# Patient Record
Sex: FEMALE
[De-identification: names, ages, dates, MRNs, and addresses within clinical notes are randomized per-mention and may not be internally consistent; named-entity substitution may affect disease eponyms.]

---

## 2018-01-15 ENCOUNTER — HOSPITAL (OUTPATIENT)
Dept: OTHER | Age: 38
End: 2018-01-15
Attending: EMERGENCY MEDICINE

## 2019-09-28 ENCOUNTER — OFFICE VISIT (OUTPATIENT)
Dept: URGENT CARE | Facility: CLINIC | Age: 39
End: 2019-09-28
Payer: COMMERCIAL

## 2019-09-28 VITALS
BODY MASS INDEX: 27.31 KG/M2 | SYSTOLIC BLOOD PRESSURE: 115 MMHG | HEIGHT: 64 IN | WEIGHT: 160 LBS | OXYGEN SATURATION: 100 % | RESPIRATION RATE: 19 BRPM | TEMPERATURE: 98 F | DIASTOLIC BLOOD PRESSURE: 80 MMHG | HEART RATE: 83 BPM

## 2019-09-28 DIAGNOSIS — B37.31 CANDIDA VAGINITIS: ICD-10-CM

## 2019-09-28 DIAGNOSIS — A64 STD (FEMALE): Primary | ICD-10-CM

## 2019-09-28 PROCEDURE — 87801 DETECT AGNT MULT DNA AMPLI: CPT

## 2019-09-28 PROCEDURE — 99203 PR OFFICE/OUTPT VISIT, NEW, LEVL III, 30-44 MIN: ICD-10-PCS | Mod: S$GLB,,, | Performed by: NURSE PRACTITIONER

## 2019-09-28 PROCEDURE — 99203 OFFICE O/P NEW LOW 30 MIN: CPT | Mod: S$GLB,,, | Performed by: NURSE PRACTITIONER

## 2019-09-28 PROCEDURE — 86703 HIV-1/HIV-2 1 RESULT ANTBDY: CPT

## 2019-09-28 PROCEDURE — 87481 CANDIDA DNA AMP PROBE: CPT | Mod: 59

## 2019-09-28 PROCEDURE — 87340 HEPATITIS B SURFACE AG IA: CPT

## 2019-09-28 PROCEDURE — 3008F BODY MASS INDEX DOCD: CPT | Mod: CPTII,S$GLB,, | Performed by: NURSE PRACTITIONER

## 2019-09-28 PROCEDURE — 86696 HERPES SIMPLEX TYPE 2 TEST: CPT

## 2019-09-28 PROCEDURE — 86592 SYPHILIS TEST NON-TREP QUAL: CPT

## 2019-09-28 PROCEDURE — 86706 HEP B SURFACE ANTIBODY: CPT

## 2019-09-28 PROCEDURE — 86803 HEPATITIS C AB TEST: CPT

## 2019-09-28 PROCEDURE — 3008F PR BODY MASS INDEX (BMI) DOCUMENTED: ICD-10-PCS | Mod: CPTII,S$GLB,, | Performed by: NURSE PRACTITIONER

## 2019-09-28 RX ORDER — FLUCONAZOLE 150 MG/1
150 TABLET ORAL DAILY
Qty: 2 TABLET | Refills: 0 | Status: SHIPPED | OUTPATIENT
Start: 2019-09-28 | End: 2019-09-29

## 2019-09-28 NOTE — PATIENT INSTRUCTIONS
STD Screening  You were tested for STDs today, we will call you in 5-7 days with the results of the testing  You were treated for gonorrhea and chlamydia today  Please take all antibiotics as directed to completion  If you need more medication when the labs result come in, we will call you and phone them in for you.    Increase condom use to prevent occurance.      IF POSITIVE:  Notify sexual partners of the need for testing.    Complete ALL medication prescribed.    NO sexual intercourse for 7 days after treatment.   Retest to ensure infection has cleared-there are infections that require more agressive treatment.  Retest for all in 6 weeks and again in 6 months to ensure true negative results.      Today's testing will give no crediable information if you have unprotected sexual activities going forward.     Syphillis cases are rising!    Gonorrhea has RESISTANT strains which is why repeat testing after treatment is important.    Gonorrhea may be present in multiple sites from just ONE area of exposure.      REMEMBER WEAR CONDOMS AND GET TESTED OFTEN.   What Are Sexually Transmitted Diseases (STDs)?  A sexually transmitted disease (STD) is a disease that is spread during sex. (An STD can also be called STI for sexually transmitted infection.) You can become infected with an STD if you have sex with someone who has an STD. Any sex that involves the penis, vagina, anus, or mouth can spread disease. Some STDs spread through body fluids such as semen, vaginal fluid, or blood. Others spread through contact with affected skin.  Who is at risk?     Places on or in the body where STDs cause damage include reproductive organs, the rectum, and the mouth.   It doesnt matter if youre straight or tubbs, male or female, young or old. Any person who has sex can get an STD. Your risk increases if:  · You have more than one partner. The more partners you have, the greater your risk.  · Your partner has other partners. If your  partner is exposed to an STD, you could be, too.  · You or your partner have had sex with other people in the past. Either of you might be carrying an STD from an earlier partner.  · You have an STD. The STD may cause sores or other health problems that increase your risk of new infections. Your risk will stay high unless you change the behaviors that put you at risk of the current infection.  Prevent future problems  Left untreated, certain STDs can lead to cancer or even death. Some can harm unborn babies whose mothers are infected. Others can cause you to not be able to have children (sterility) or can affect changes in behavior or your ability to think. You can prevent these problems with safer sex, regular checkups, and early treatment. Always use a latex condom when you have sex. Get tested if youre at risk. And get treated early if you have an STD.  Getting checked  The only sure way to know if you have an STD is to get checked by a healthcare provider. If you notice a change in how your body looks or feels, have it checked out. But keep in mind, STDs dont always show symptoms. So if youre at risk of STDs, get checked regularly. If you find you have an STD, be sure your partner gets treatment, too. If not, his or her health is at risk. And left untreated, your partner could pass the STD back to you, or on to others.  Common symptoms  Be alert to any changes in your body and your partners body. Symptoms may appear in or near the vagina, penis, rectum, mouth, or throat. They include:  · Unusual discharge  · Lumps, bumps, or rashes  · Sores that may be painful, itchy, or painless  · Itchy skin  · Burning with urination  · Pain in the pelvis, belly (abdomen), or rectum  Even if you dont have symptoms  You may have an STD, even if you dont have symptoms. If you think you are at risk, get checked. Go to a clinic or to your healthcare provider. If your partner has an STD, you need to be tested too, even if you  feel fine.  Vaccines to prevent disease  Vaccines (also called immunizations) are available to prevent hepatitis A and hepatitis B. These are two kinds of STDs. There is also a vaccine to prevent HPV. This is a virus that can be passed from person to person through sexual contact. Ask your healthcare provider whether any of these vaccines is right for you.   Date Last Reviewed: 11/1/2016  © 3923-9638 [employer]. [address] [place] [address], [place] [address]. All rights reserved. This information is not intended as a substitute for professional medical care. Always follow your healthcare professional's instructions.        Preventing Vaginal Infection  These steps can help you stay comfortable during treatment of a vaginal infection. They also help prevent vaginal infections in the future.  Keeping a healthy balance  Factors that change the normal balance in the vagina can lead to a vaginal infection. To help keep the balance normal, try these tips:  · Change out of wet bathing suits and damp exercise clothes as soon as possible. Yeast thrive in a warm, moist environment.  · Avoid wearing tight pants. Choose cotton underwear and pantyhose that have a cotton crotch. Cotton keeps you cooler and drier than synthetics.  · Don't douche unless directed by your health care provider. Douching can destroy friendly bacteria and change the vagina's normal balance.  · Wipe from front to back after using the toilet. This prevents bacteria from spreading from the anus to the vulva.  · Wash the vulva with mild, unscented soap or with plain water.  · Wash your diaphragm, spermicide applicators, and sex toys with mild soap and water after use. Dry them thoroughly before putting them away.  · Change tampons often (every 2 hours to 4 hours). Leaving a tampon in for too long may disrupt the balance of vaginal bacteria.  · Avoid vaginal sprays, scented toilet paper and soaps, and deodorant tampons or pads, which can cause  vaginal irritation  Staying healthy overall  Good overall health can help you resist infection. To be healthier:  · Help protect yourself from STDs by using latex condoms for intercourse. Ask your health care provider for more information about safer sex.  · Eat a variety of healthy foods.  · Exercise regularly.  · Get enough rest and sleep.  · Maintain a healthy weight. If you need to lose weight, ask your health care provider for advice on how to start.  Date Last Reviewed: 5/18/2015  © 4325-4175 [employer]. [address] [place] [address], [place] [address]. All rights reserved. This information is not intended as a substitute for professional medical care. Always follow your healthcare professional's instructions.

## 2019-09-28 NOTE — PROGRESS NOTES
"Subjective:       Patient ID: Chantel Alas is a 39 y.o. female.    Vitals:  height is 5' 4" (1.626 m) and weight is 72.6 kg (160 lb). Her oral temperature is 98.4 °F (36.9 °C). Her blood pressure is 115/80 and her pulse is 83. Her respiration is 19 and oxygen saturation is 100%.     Chief Complaint: Vaginal Itching    Patient was sexually active three months ago, no unprotected sex, white vaginal discharge with itching, wants to get tested for STD    Vaginal Itching   The patient's primary symptoms include genital itching and vaginal discharge (white discharge). The patient's pertinent negatives include no genital lesions, genital odor, genital rash, missed menses, pelvic pain or vaginal bleeding. This is a new problem. The current episode started in the past 7 days (tuesday). The problem occurs constantly. The problem has been unchanged. The patient is experiencing no pain. Pertinent negatives include no abdominal pain, anorexia, back pain, chills, constipation, diarrhea, discolored urine, dysuria, fever, flank pain, frequency, headaches, hematuria, joint pain, joint swelling, nausea, painful intercourse, rash, sore throat, urgency or vomiting. The vaginal discharge was milky, thick and white. There has been no bleeding. She is sexually active. She uses nothing for contraception. Her menstrual history has been regular. There is no history of an abdominal surgery, a  section, an ectopic pregnancy, endometriosis, a gynecological surgery, herpes simplex, menorrhagia, metrorrhagia, miscarriage, ovarian cysts, perineal abscess, PID, an STD, a terminated pregnancy or vaginosis.       Constitution: Negative for chills and fever.   HENT: Negative for sore throat.    Neck: Negative for painful lymph nodes.   Gastrointestinal: Negative for abdominal pain, history of abdominal surgery, nausea, vomiting, constipation and diarrhea.   Genitourinary: Positive for vaginal discharge (white discharge). Negative for dysuria, " frequency, urgency, urine decreased, flank pain, hematuria, history of kidney stones, painful menstruation, irregular menstruation, missed menses, heavy menstrual bleeding, ovarian cysts, genital trauma, vaginal pain, vaginal bleeding, vaginal odor, painful intercourse, genital sore, painful ejaculation and pelvic pain.   Musculoskeletal: Negative for back pain.   Skin: Negative for rash and lesion.   Neurological: Negative for headaches.   Hematologic/Lymphatic: Negative for swollen lymph nodes.       Objective:      Physical Exam   Constitutional: She is oriented to person, place, and time. She appears well-developed and well-nourished.   HENT:   Head: Normocephalic and atraumatic.   Right Ear: External ear normal.   Left Ear: External ear normal.   Nose: Nose normal. No nasal deformity. No epistaxis.   Mouth/Throat: Oropharynx is clear and moist and mucous membranes are normal.   Eyes: Conjunctivae and lids are normal.   Neck: Trachea normal, normal range of motion and phonation normal. Neck supple.   Cardiovascular: Normal rate, regular rhythm, normal heart sounds and normal pulses.   Pulmonary/Chest: Effort normal and breath sounds normal.   Abdominal: Soft. Normal appearance and bowel sounds are normal. She exhibits no distension and no mass. There is no tenderness. There is no CVA tenderness.   Genitourinary:   Genitourinary Comments: Patient deferred  exam   Neurological: She is alert and oriented to person, place, and time.   Skin: Skin is warm, dry and intact.   Psychiatric: She has a normal mood and affect. Her speech is normal and behavior is normal. Cognition and memory are normal.   Nursing note and vitals reviewed.      Assessment:       1. STD (female)    2. Candida vaginitis        Plan:         STD (female)  -     Bv, Trich, Candida by DNA Probe (Swab Only)  -     HIV 1/2 Ag/Ab (4th Gen)  -     Hepatitis C antibody  -     Hepatitis B surface Ab  -     Hepatitis B surface antigen  -     RPR  -      Herpes Simplex Virus (HSV) Types 1 & 2, IgG, Herpes Titer    Candida vaginitis  -     fluconazole (DIFLUCAN) 150 MG Tab; Take 1 tablet (150 mg total) by mouth once daily. Then take the next tablet in 72 hours for 1 day  Dispense: 2 tablet; Refill: 0      Patient Instructions     STD Screening  You were tested for STDs today, we will call you in 5-7 days with the results of the testing  You were treated for gonorrhea and chlamydia today  Please take all antibiotics as directed to completion  If you need more medication when the labs result come in, we will call you and phone them in for you.    Increase condom use to prevent occurance.      IF POSITIVE:  Notify sexual partners of the need for testing.    Complete ALL medication prescribed.    NO sexual intercourse for 7 days after treatment.   Retest to ensure infection has cleared-there are infections that require more agressive treatment.  Retest for all in 6 weeks and again in 6 months to ensure true negative results.      Today's testing will give no crediable information if you have unprotected sexual activities going forward.     Syphillis cases are rising!    Gonorrhea has RESISTANT strains which is why repeat testing after treatment is important.    Gonorrhea may be present in multiple sites from just ONE area of exposure.      REMEMBER WEAR CONDOMS AND GET TESTED OFTEN.   What Are Sexually Transmitted Diseases (STDs)?  A sexually transmitted disease (STD) is a disease that is spread during sex. (An STD can also be called STI for sexually transmitted infection.) You can become infected with an STD if you have sex with someone who has an STD. Any sex that involves the penis, vagina, anus, or mouth can spread disease. Some STDs spread through body fluids such as semen, vaginal fluid, or blood. Others spread through contact with affected skin.  Who is at risk?     Places on or in the body where STDs cause damage include reproductive organs, the rectum, and the  mouth.   It doesnt matter if youre straight or tubbs, male or female, young or old. Any person who has sex can get an STD. Your risk increases if:  · You have more than one partner. The more partners you have, the greater your risk.  · Your partner has other partners. If your partner is exposed to an STD, you could be, too.  · You or your partner have had sex with other people in the past. Either of you might be carrying an STD from an earlier partner.  · You have an STD. The STD may cause sores or other health problems that increase your risk of new infections. Your risk will stay high unless you change the behaviors that put you at risk of the current infection.  Prevent future problems  Left untreated, certain STDs can lead to cancer or even death. Some can harm unborn babies whose mothers are infected. Others can cause you to not be able to have children (sterility) or can affect changes in behavior or your ability to think. You can prevent these problems with safer sex, regular checkups, and early treatment. Always use a latex condom when you have sex. Get tested if youre at risk. And get treated early if you have an STD.  Getting checked  The only sure way to know if you have an STD is to get checked by a healthcare provider. If you notice a change in how your body looks or feels, have it checked out. But keep in mind, STDs dont always show symptoms. So if youre at risk of STDs, get checked regularly. If you find you have an STD, be sure your partner gets treatment, too. If not, his or her health is at risk. And left untreated, your partner could pass the STD back to you, or on to others.  Common symptoms  Be alert to any changes in your body and your partners body. Symptoms may appear in or near the vagina, penis, rectum, mouth, or throat. They include:  · Unusual discharge  · Lumps, bumps, or rashes  · Sores that may be painful, itchy, or painless  · Itchy skin  · Burning with urination  · Pain in the  pelvis, belly (abdomen), or rectum  Even if you dont have symptoms  You may have an STD, even if you dont have symptoms. If you think you are at risk, get checked. Go to a clinic or to your healthcare provider. If your partner has an STD, you need to be tested too, even if you feel fine.  Vaccines to prevent disease  Vaccines (also called immunizations) are available to prevent hepatitis A and hepatitis B. These are two kinds of STDs. There is also a vaccine to prevent HPV. This is a virus that can be passed from person to person through sexual contact. Ask your healthcare provider whether any of these vaccines is right for you.   Date Last Reviewed: 11/1/2016  © 1926-0970 [email or web address]. [address] [place] [address], [place] [address]. All rights reserved. This information is not intended as a substitute for professional medical care. Always follow your healthcare professional's instructions.        Preventing Vaginal Infection  These steps can help you stay comfortable during treatment of a vaginal infection. They also help prevent vaginal infections in the future.  Keeping a healthy balance  Factors that change the normal balance in the vagina can lead to a vaginal infection. To help keep the balance normal, try these tips:  · Change out of wet bathing suits and damp exercise clothes as soon as possible. Yeast thrive in a warm, moist environment.  · Avoid wearing tight pants. Choose cotton underwear and pantyhose that have a cotton crotch. Cotton keeps you cooler and drier than synthetics.  · Don't douche unless directed by your health care provider. Douching can destroy friendly bacteria and change the vagina's normal balance.  · Wipe from front to back after using the toilet. This prevents bacteria from spreading from the anus to the vulva.  · Wash the vulva with mild, unscented soap or with plain water.  · Wash your diaphragm, spermicide applicators, and sex toys with mild soap and water after use. Dry  them thoroughly before putting them away.  · Change tampons often (every 2 hours to 4 hours). Leaving a tampon in for too long may disrupt the balance of vaginal bacteria.  · Avoid vaginal sprays, scented toilet paper and soaps, and deodorant tampons or pads, which can cause vaginal irritation  Staying healthy overall  Good overall health can help you resist infection. To be healthier:  · Help protect yourself from STDs by using latex condoms for intercourse. Ask your health care provider for more information about safer sex.  · Eat a variety of healthy foods.  · Exercise regularly.  · Get enough rest and sleep.  · Maintain a healthy weight. If you need to lose weight, ask your health care provider for advice on how to start.  Date Last Reviewed: 5/18/2015  © 2933-5851 [employer]. [address] [place] [address], [place] [address]. All rights reserved. This information is not intended as a substitute for professional medical care. Always follow your healthcare professional's instructions.

## 2019-09-30 LAB
HBV SURFACE AB SER-ACNC: NEGATIVE M[IU]/ML
HBV SURFACE AG SERPL QL IA: NEGATIVE
HCV AB SERPL QL IA: NEGATIVE
HIV 1+2 AB+HIV1 P24 AG SERPL QL IA: NEGATIVE
RPR SER QL: NORMAL

## 2019-10-02 LAB
BACTERIAL VAGINOSIS DNA: NEGATIVE
CANDIDA GLABRATA DNA: NEGATIVE
CANDIDA KRUSEI DNA: NEGATIVE
CANDIDA RRNA VAG QL PROBE: NEGATIVE
HSV1 IGG SERPL QL IA: NEGATIVE
HSV2 IGG SERPL QL IA: POSITIVE
T VAGINALIS RRNA GENITAL QL PROBE: NEGATIVE

## 2019-10-04 ENCOUNTER — TELEPHONE (OUTPATIENT)
Dept: URGENT CARE | Facility: CLINIC | Age: 39
End: 2019-10-04

## 2019-10-15 ENCOUNTER — OFFICE VISIT (OUTPATIENT)
Dept: OBSTETRICS AND GYNECOLOGY | Facility: CLINIC | Age: 39
End: 2019-10-15
Payer: COMMERCIAL

## 2019-10-15 VITALS
DIASTOLIC BLOOD PRESSURE: 68 MMHG | BODY MASS INDEX: 28.34 KG/M2 | SYSTOLIC BLOOD PRESSURE: 98 MMHG | HEIGHT: 64 IN | WEIGHT: 166 LBS

## 2019-10-15 DIAGNOSIS — B97.7 HPV IN FEMALE: ICD-10-CM

## 2019-10-15 DIAGNOSIS — Z11.3 SCREENING FOR STD (SEXUALLY TRANSMITTED DISEASE): ICD-10-CM

## 2019-10-15 DIAGNOSIS — N90.89 VULVAR IRRITATION: Primary | ICD-10-CM

## 2019-10-15 DIAGNOSIS — Z31.69 ENCOUNTER FOR PRECONCEPTION CONSULTATION: ICD-10-CM

## 2019-10-15 PROCEDURE — 99999 PR PBB SHADOW E&M-EST. PATIENT-LVL III: ICD-10-PCS | Mod: PBBFAC,,, | Performed by: STUDENT IN AN ORGANIZED HEALTH CARE EDUCATION/TRAINING PROGRAM

## 2019-10-15 PROCEDURE — 3008F PR BODY MASS INDEX (BMI) DOCUMENTED: ICD-10-PCS | Mod: CPTII,S$GLB,, | Performed by: STUDENT IN AN ORGANIZED HEALTH CARE EDUCATION/TRAINING PROGRAM

## 2019-10-15 PROCEDURE — 87801 DETECT AGNT MULT DNA AMPLI: CPT

## 2019-10-15 PROCEDURE — 87481 CANDIDA DNA AMP PROBE: CPT | Mod: 59

## 2019-10-15 PROCEDURE — 87624 HPV HI-RISK TYP POOLED RSLT: CPT

## 2019-10-15 PROCEDURE — 99204 OFFICE O/P NEW MOD 45 MIN: CPT | Mod: S$GLB,,, | Performed by: STUDENT IN AN ORGANIZED HEALTH CARE EDUCATION/TRAINING PROGRAM

## 2019-10-15 PROCEDURE — 3008F BODY MASS INDEX DOCD: CPT | Mod: CPTII,S$GLB,, | Performed by: STUDENT IN AN ORGANIZED HEALTH CARE EDUCATION/TRAINING PROGRAM

## 2019-10-15 PROCEDURE — 88175 CYTOPATH C/V AUTO FLUID REDO: CPT

## 2019-10-15 PROCEDURE — 99999 PR PBB SHADOW E&M-EST. PATIENT-LVL III: CPT | Mod: PBBFAC,,, | Performed by: STUDENT IN AN ORGANIZED HEALTH CARE EDUCATION/TRAINING PROGRAM

## 2019-10-15 PROCEDURE — 99204 PR OFFICE/OUTPT VISIT, NEW, LEVL IV, 45-59 MIN: ICD-10-PCS | Mod: S$GLB,,, | Performed by: STUDENT IN AN ORGANIZED HEALTH CARE EDUCATION/TRAINING PROGRAM

## 2019-10-15 NOTE — PATIENT INSTRUCTIONS
Improving Your Fertility  Your doctor may suggest some simple methods to help you get pregnant. Most focus on predicting ovulation. This is the time when sex has the best chance of success. Your doctor may also advise working on other factors that can affect fertility.  Predicting ovulation  Conception is only possible when a woman is ovulating. One signal of ovulation is a surge in the hormone LH. Other signals include changes in body temperature and changes in cervical mucus.  Ovulation predictor kits  One of the best signals of ovulation is a sudden increase in the hormone LH. A simple urine test called an ovulation predictor kit is available at most [facility]. It can help pinpoint this surge. Have sex the day you notice the surge. Keep having sex daily over the next 3 days, or as often as your doctor suggests.  Body temperature changes  A womans resting temperature or basal body temperature increases after ovulation.  By this point in the cycle, having sex will not increase your chances of getting pregnant. However, it is an indication that ovulation occurred.   Cervical mucus changes  Shortly before ovulation a womans cervical mucus gets clear and stretchy. The mucus also increases in quantity. This makes it easier for sperm to travel through the cervix. Not all women notice these changes. But if you do, begin having sex daily until the mucus thickens again.  Working on other factors that affect fertility  Certain lifestyle and health habits can affect fertility. Be sure to talk with your doctor about these issues. You may be able to make some simple changes to improve your chances of conception. Keep in mind, though, that it can take time for healthy changes to improve your fertility.  Weight problems  Being overweight or underweight can affect hormone levels. In women, this can impair ovulation. In men, obesity can decrease sperm count.  Medications, supplements, and herbal remedies  Medications,  supplements, and herbal remedies can affect hormone levels in men and women. They can also affect the quantity and quality of sperm. Be sure to tell your doctor about any substances you take.  Sexually transmitted diseases  Sexually transmitted diseases (STDs) can scar the reproductive organs in both men and women. If youve had an STD, be sure to tell your doctor.  Testicular heat  A mans testicles are normally a few degrees cooler than the rest of his body. When the testicles are too warm, sperm production may decline. Try to avoid excess heat from things like hot tubs and saunas.  Smoking, alcohol, and drugs  Smoking can affect estrogen levels and decrease egg production in women. Men who smoke may have lowered sperm count. Excessive alcohol or drug use can also decrease fertility in both men and women.   Chemical exposure  Certain chemicals can affect hormone levels. Talk with your doctor if youre regularly exposed to strong chemicals. You should also ask about lubricants used during sex. Some types can be toxic to sperm.  Other factors  Excessive exercise can decrease hormone production. It can also cause irregular menstruation in women. Ongoing stress is another factor that may affect fertility and cause ovulation problems.  Date Last Reviewed: 5/21/2015  © 5977-9252 [employer]. [address] [place] [address], [place] [address]. All rights reserved. This information is not intended as a substitute for professional medical care. Always follow your healthcare professional's instructions.

## 2019-10-15 NOTE — PROGRESS NOTES
History & Physical  Gynecology      SUBJECTIVE:     Chief Complaint: No chief complaint on file.       History of Present Illness:  Pt presents today for many issues. She had pap screening at [facility] where cytology was negative, but HPV testing was positive (nonspecific). She states she was not told any of this information and was not happy with her care there. Trich, GC and CT negative on these records as well (scanned into media).   She then went to an urgent care two weeks ago for STD Screening after her partner found out his ex-wife was taking medications for herpes unbeknownst to him. At the urgent care, she was negative on vaginosis screen and blood work aside from BLOOD HSV2 positive. She denies ever having an outbreak.  She is also concerned about having herpes and HSV.   She is also concerned about fertility. She has regular cycles, her partner has two children with another person. Not taking PNV. Does not smoke.      Review of patient's allergies indicates:  No Known Allergies    Past Medical History:   Diagnosis Date    Herpes simplex virus (HSV) infection      Past Surgical History:   Procedure Laterality Date    WISDOM TOOTH EXTRACTION       OB History        1    Para        Term                AB   1    Living           SAB   1    TAB        Ectopic        Multiple        Live Births                   Family History   Problem Relation Age of Onset    Breast cancer Neg Hx     Colon cancer Neg Hx     Ovarian cancer Neg Hx      Social History     Tobacco Use    Smoking status: Never Smoker    Smokeless tobacco: Never Used   Substance Use Topics    Alcohol use: Yes     Comment: socially    Drug use: Never       No current outpatient medications on file.     No current facility-administered medications for this visit.          Review of Systems:  Review of Systems   Constitutional: Negative for activity change, appetite change and fever.   Gastrointestinal: Negative for  abdominal pain, blood in stool, constipation, diarrhea, nausea and vomiting.   Genitourinary: Positive for pelvic pain (intermittent left sided since last office visit) and vaginal discharge. Negative for dysmenorrhea, dyspareunia, dysuria, hematuria, vaginal bleeding, vaginal pain and vaginal odor.   Musculoskeletal: Negative for arthralgias and joint swelling.   Psychiatric/Behavioral: The patient is nervous/anxious.         OBJECTIVE:     Physical Exam:  Physical Exam   Constitutional: She is oriented to person, place, and time. She appears well-developed and well-nourished. No distress.   HENT:   Head: Normocephalic and atraumatic.   Eyes: Conjunctivae are normal.   Neck: Normal range of motion.   Cardiovascular: Normal rate.   Pulmonary/Chest: Effort normal.   Abdominal: Soft. She exhibits no distension and no mass. There is no tenderness. There is no rebound and no guarding.   Genitourinary: There is no rash, tenderness, lesion or injury on the right labia. There is no rash, tenderness, lesion or injury on the left labia. Uterus is not deviated, not enlarged (mildly, irregular, suspicious for fibroids), not fixed and not tender. Cervix exhibits no motion tenderness, no discharge and no friability. Right adnexum displays no mass, no tenderness and no fullness. Left adnexum displays no mass, no tenderness and no fullness. No erythema, tenderness or bleeding in the vagina. No foreign body in the vagina. No signs of injury around the vagina. No vaginal discharge found.   Musculoskeletal: Normal range of motion.   Neurological: She is alert and oriented to person, place, and time.   Skin: Skin is warm and dry. She is not diaphoretic.   Psychiatric: She has a normal mood and affect.   Anxious, tearful at times   Vitals reviewed.        ASSESSMENT:       ICD-10-CM ICD-9-CM    1. Vulvar irritation N90.89 624.8 Vaginosis Screen by DNA Probe      CANCELED: C. trachomatis/N. gonorrhoeae by AMP DNA   2. Screening for STD  "(sexually transmitted disease) Z11.3 V74.5 Vaginosis Screen by DNA Probe      CANCELED: C. trachomatis/N. gonorrhoeae by AMP DNA   3. HPV in female B97.7 079.4 HPV High Risk Genotypes, PCR   4. Encounter for preconception consultation Z31.69 V26.49           Plan:      Diagnoses and all orders for this visit:    Vulvar irritation  -     Vaginosis Screen by DNA Probe  -     Denies any current lesions or past lesions. Reassured patient that the blood test should not be used to diagnose genital herpes.    HPV in female  -     The patient is given a full explanation of the ubiquitous nature of HPV. Some research suggests that at least three out of four people who have sex will get a genital HPV infection at some time in their lives. Sexual contact with an infected partner, regardless of the sex of the partner, is the most common way the virus is spread. Most often there are no signs or symptoms of genital HPV infection. Low risk viruses can cause genital warts, but they do not cause abnormal pap smears. High risk viruses can cause abnormal pap smears, but they do not cause genital warts. In most women, the immune system destroys the virus before it causes cancer. But in some women, HPV is not destroyed and can lead to precancer or cancer in those women. There is no "cure" for HPV. Smoking, however, can make it more difficult for your body to clear the virus.  -     Reviewed ASCCP guidelines and the two options that we have as far as repeat testing in one year or performing HPV specific testing. Pt would feel more comfortable with type 16 and 18 specific testing. Understands that if positive for 16 or 18, will need colposcopy; if positive for other HR types, will need repeat pap with co test in 12 months.  -     HPV High Risk Genotypes, PCR    Encounter for preconception consultation  - Encouraged PNV  - Discussed timed intercourse. Recommended she only attempt this for 3-6 months before seeking fertility management due " to her age.       Follow up in about 1 year (around 10/15/2020) for pap + co test.  Counseling time: 30 minutes    Vero Wilkins

## 2019-10-15 NOTE — LETTER
October 15, 2019      Juan Schuler MD  [address] [facility] [address]           [facility] - Obstetrics and Gynecology  [address] [facility] [address]  Phone: [phone]  Fax: [phone]          Patient: Chantel Alas   MR Number: [id number]   YOB: 1980   Date of Visit: 10/15/2019       Dear Dr. Juan Schuler:    Thank you for referring Chantel Alas to me for evaluation. Attached you will find relevant portions of my assessment and plan of care.    If you have questions, please do not hesitate to call me. I look forward to following Chantel Alas along with you.    Sincerely,    Vero Wilkins MD    Enclosure  CC:  No Recipients    If you would like to receive this communication electronically, please contact [email or web address] or [phone] to request more information on [employer] Link access.    For providers and/or their staff who would like to refer a patient to [facility], please contact us through our one-stop-shop provider referral line, [facility], at [phone].    If you feel you have received this communication in error or would no longer like to receive these types of communications, please e-mail [email or web address]

## 2019-10-16 LAB
BACTERIAL VAGINOSIS DNA: NEGATIVE
CANDIDA GLABRATA DNA: NEGATIVE
CANDIDA KRUSEI DNA: NEGATIVE
CANDIDA RRNA VAG QL PROBE: NEGATIVE
T VAGINALIS RRNA GENITAL QL PROBE: NEGATIVE

## 2019-10-19 LAB
HPV HR 12 DNA CVX QL NAA+PROBE: POSITIVE
HPV16 AG SPEC QL: NEGATIVE
HPV18 DNA SPEC QL NAA+PROBE: NEGATIVE

## 2019-11-25 ENCOUNTER — LAB VISIT (OUTPATIENT)
Dept: LAB | Facility: HOSPITAL | Age: 39
End: 2019-11-25
Attending: HOSPITALIST
Payer: COMMERCIAL

## 2019-11-25 ENCOUNTER — OFFICE VISIT (OUTPATIENT)
Dept: INTERNAL MEDICINE | Facility: CLINIC | Age: 39
End: 2019-11-25
Payer: COMMERCIAL

## 2019-11-25 VITALS
HEART RATE: 78 BPM | RESPIRATION RATE: 16 BRPM | TEMPERATURE: 98 F | HEIGHT: 64 IN | SYSTOLIC BLOOD PRESSURE: 110 MMHG | BODY MASS INDEX: 28.95 KG/M2 | DIASTOLIC BLOOD PRESSURE: 70 MMHG | WEIGHT: 169.56 LBS

## 2019-11-25 DIAGNOSIS — Z00.00 ANNUAL PHYSICAL EXAM: ICD-10-CM

## 2019-11-25 DIAGNOSIS — Z00.00 ANNUAL PHYSICAL EXAM: Primary | ICD-10-CM

## 2019-11-25 DIAGNOSIS — Z12.39 BREAST CANCER SCREENING: ICD-10-CM

## 2019-11-25 LAB
25(OH)D3+25(OH)D2 SERPL-MCNC: 11 NG/ML (ref 30–96)
ALBUMIN SERPL BCP-MCNC: 3.7 G/DL (ref 3.5–5.2)
ALP SERPL-CCNC: 61 U/L (ref 55–135)
ALT SERPL W/O P-5'-P-CCNC: 13 U/L (ref 10–44)
ANION GAP SERPL CALC-SCNC: 6 MMOL/L (ref 8–16)
AST SERPL-CCNC: 20 U/L (ref 10–40)
BASOPHILS # BLD AUTO: 0.05 K/UL (ref 0–0.2)
BASOPHILS NFR BLD: 0.6 % (ref 0–1.9)
BILIRUB SERPL-MCNC: 0.4 MG/DL (ref 0.1–1)
BUN SERPL-MCNC: 10 MG/DL (ref 6–20)
CALCIUM SERPL-MCNC: 9.1 MG/DL (ref 8.7–10.5)
CHLORIDE SERPL-SCNC: 105 MMOL/L (ref 95–110)
CHOLEST SERPL-MCNC: 139 MG/DL (ref 120–199)
CHOLEST/HDLC SERPL: 2.5 {RATIO} (ref 2–5)
CO2 SERPL-SCNC: 28 MMOL/L (ref 23–29)
CREAT SERPL-MCNC: 0.7 MG/DL (ref 0.5–1.4)
DIFFERENTIAL METHOD: ABNORMAL
EOSINOPHIL # BLD AUTO: 0.3 K/UL (ref 0–0.5)
EOSINOPHIL NFR BLD: 4.1 % (ref 0–8)
ERYTHROCYTE [DISTWIDTH] IN BLOOD BY AUTOMATED COUNT: 15.9 % (ref 11.5–14.5)
EST. GFR  (AFRICAN AMERICAN): >60 ML/MIN/1.73 M^2
EST. GFR  (NON AFRICAN AMERICAN): >60 ML/MIN/1.73 M^2
ESTIMATED AVG GLUCOSE: 103 MG/DL (ref 68–131)
GLUCOSE SERPL-MCNC: 80 MG/DL (ref 70–110)
HBA1C MFR BLD HPLC: 5.2 % (ref 4–5.6)
HCT VFR BLD AUTO: 37.3 % (ref 37–48.5)
HDLC SERPL-MCNC: 56 MG/DL (ref 40–75)
HDLC SERPL: 40.3 % (ref 20–50)
HGB BLD-MCNC: 11.2 G/DL (ref 12–16)
IMM GRANULOCYTES # BLD AUTO: 0.02 K/UL (ref 0–0.04)
IMM GRANULOCYTES NFR BLD AUTO: 0.2 % (ref 0–0.5)
LDLC SERPL CALC-MCNC: 72 MG/DL (ref 63–159)
LYMPHOCYTES # BLD AUTO: 2.8 K/UL (ref 1–4.8)
LYMPHOCYTES NFR BLD: 34.8 % (ref 18–48)
MCH RBC QN AUTO: 26 PG (ref 27–31)
MCHC RBC AUTO-ENTMCNC: 30 G/DL (ref 32–36)
MCV RBC AUTO: 87 FL (ref 82–98)
MONOCYTES # BLD AUTO: 0.6 K/UL (ref 0.3–1)
MONOCYTES NFR BLD: 7.6 % (ref 4–15)
NEUTROPHILS # BLD AUTO: 4.2 K/UL (ref 1.8–7.7)
NEUTROPHILS NFR BLD: 52.7 % (ref 38–73)
NONHDLC SERPL-MCNC: 83 MG/DL
NRBC BLD-RTO: 0 /100 WBC
PLATELET # BLD AUTO: 299 K/UL (ref 150–350)
PMV BLD AUTO: 10.5 FL (ref 9.2–12.9)
POTASSIUM SERPL-SCNC: 4.5 MMOL/L (ref 3.5–5.1)
PROT SERPL-MCNC: 6.8 G/DL (ref 6–8.4)
RBC # BLD AUTO: 4.3 M/UL (ref 4–5.4)
SODIUM SERPL-SCNC: 139 MMOL/L (ref 136–145)
TRIGL SERPL-MCNC: 55 MG/DL (ref 30–150)
TSH SERPL DL<=0.005 MIU/L-ACNC: 1.63 UIU/ML (ref 0.4–4)
WBC # BLD AUTO: 8.01 K/UL (ref 3.9–12.7)

## 2019-11-25 PROCEDURE — 80053 COMPREHEN METABOLIC PANEL: CPT

## 2019-11-25 PROCEDURE — 36415 COLL VENOUS BLD VENIPUNCTURE: CPT | Mod: PO

## 2019-11-25 PROCEDURE — 99999 PR PBB SHADOW E&M-EST. PATIENT-LVL IV: CPT | Mod: PBBFAC,,, | Performed by: HOSPITALIST

## 2019-11-25 PROCEDURE — 85025 COMPLETE CBC W/AUTO DIFF WBC: CPT

## 2019-11-25 PROCEDURE — 99385 PR PREVENTIVE VISIT,NEW,18-39: ICD-10-PCS | Mod: S$GLB,,, | Performed by: HOSPITALIST

## 2019-11-25 PROCEDURE — 82306 VITAMIN D 25 HYDROXY: CPT

## 2019-11-25 PROCEDURE — 99999 PR PBB SHADOW E&M-EST. PATIENT-LVL IV: ICD-10-PCS | Mod: PBBFAC,,, | Performed by: HOSPITALIST

## 2019-11-25 PROCEDURE — 80061 LIPID PANEL: CPT

## 2019-11-25 PROCEDURE — 83036 HEMOGLOBIN GLYCOSYLATED A1C: CPT

## 2019-11-25 PROCEDURE — 84443 ASSAY THYROID STIM HORMONE: CPT

## 2019-11-25 PROCEDURE — 99385 PREV VISIT NEW AGE 18-39: CPT | Mod: S$GLB,,, | Performed by: HOSPITALIST

## 2019-11-25 NOTE — PROGRESS NOTES
"Subjective:     @Patient ID: Chantel Alas is a 39 y.o. female.    Chief Complaint: Annual Exam (to establish care)    HPI    39 y.o. female here for annual exam. Pt is new to me. She works as a  for [employer]. Had some concerns about recent dx of hsv on routing Std screening. Otherwise, she reports she is doing well.    Lipid disorders/ASCVD risk (ages >/= 45 or >/= 20 if increased risk ): ordered  DM (>45y yearly or if obese, HTN): A1c ordered  Eye exam: n/a  Breast Cancer (40-50y discretion of pt, 50-74y every 1-2 years): Mammogram DUE 2020  Cervical Cancer (Pap Smear ages 21-65 every 3 years or Pap + HPV q5 years after 30 years of age): Done 10/15/2019      Vaccines:   Influenza (yearly) declines  Tetanus (every 10 yrs - 1st tdap)           Exercise: none  Diet:  regular          Review of Systems   Constitutional: Negative for chills and fever.   HENT: Negative for congestion and sore throat.    Eyes: Negative for pain and visual disturbance.   Respiratory: Negative for cough and shortness of breath.    Cardiovascular: Negative for chest pain and leg swelling.   Gastrointestinal: Negative for abdominal pain, nausea and vomiting.   Endocrine: Negative for polydipsia and polyuria.   Genitourinary: Negative for difficulty urinating and dysuria.   Musculoskeletal: Negative for arthralgias and back pain.   Skin: Negative for rash and wound.   Neurological: Negative for dizziness, weakness and headaches.   Psychiatric/Behavioral: Negative for agitation and confusion.     Past medical history, surgical history, and family medical history reviewed and updated as appropriate.    Medications and allergies reviewed.     Objective:     Vitals:    11/25/19 0959   BP: 110/70   BP Location: Right arm   Patient Position: Sitting   Pulse: 78   Resp: 16   Temp: 97.8 °F (36.6 °C)   TempSrc: Oral   Weight: 76.9 kg (169 lb 8.5 oz)   Height: 5' 4" (1.626 m)     Body mass index is 29.1 kg/m².  Physical Exam "   Constitutional: She is oriented to person, place, and time. She appears well-developed and well-nourished. No distress.   HENT:   Head: Normocephalic and atraumatic.   Right Ear: External ear normal.   Left Ear: External ear normal.   Mouth/Throat: Oropharynx is clear and moist. No oropharyngeal exudate.   Eyes: Conjunctivae are normal. Right eye exhibits no discharge. Left eye exhibits no discharge.   Neck: Normal range of motion. Neck supple.   Cardiovascular: Normal rate, regular rhythm and intact distal pulses. Exam reveals no friction rub.   No murmur heard.  Pulmonary/Chest: Effort normal and breath sounds normal.   Abdominal: Soft. Bowel sounds are normal. She exhibits no distension. There is no tenderness. There is no guarding.   Musculoskeletal: Normal range of motion. She exhibits no edema.   Lymphadenopathy:     She has no cervical adenopathy.   Neurological: She is alert and oriented to person, place, and time.   Skin: Skin is warm and dry.   Psychiatric: She has a normal mood and affect. Her behavior is normal.   Vitals reviewed.      No results found for: WBC, HGB, HCT, PLT, CHOL, TRIG, HDL, LDLDIRECT, ALT, AST, NA, K, CL, CREATININE, BUN, CO2, TSH, PSA, INR, GLUF, HGBA1C, MICROALBUR    Assessment:     1. Annual physical exam    2. Breast cancer screening      Plan:   Chantel was seen today for annual exam.    Diagnoses and all orders for this visit:    Annual physical exam  - Encouraged healthy diet and exercise. Pt's questions answered about hsv test results.   -     Comprehensive metabolic panel; Future  -     CBC auto differential; Future  -     TSH; Future  -     Vitamin D; Future  -     Lipid panel; Future  -     Urinalysis; Future  -     HEMOGLOBIN A1C; Future    Breast cancer screening   -     Mammo Digital Screening Bilateral With CAD; Future         Follow up in about 1 year (around 11/25/2020), or if symptoms worsen or fail to improve.    Doretha Hernandes MD  Internal  Medicine    11/25/2019

## 2019-11-26 ENCOUNTER — PATIENT MESSAGE (OUTPATIENT)
Dept: INTERNAL MEDICINE | Facility: CLINIC | Age: 39
End: 2019-11-26

## 2020-01-20 ENCOUNTER — OFFICE VISIT (OUTPATIENT)
Dept: OPTOMETRY | Facility: CLINIC | Age: 40
End: 2020-01-20
Payer: COMMERCIAL

## 2020-01-20 ENCOUNTER — HOSPITAL ENCOUNTER (OUTPATIENT)
Dept: RADIOLOGY | Facility: HOSPITAL | Age: 40
Discharge: HOME | End: 2020-01-20
Attending: HOSPITALIST
Payer: COMMERCIAL

## 2020-01-20 VITALS — BODY MASS INDEX: 28.95 KG/M2 | WEIGHT: 169.56 LBS | HEIGHT: 64 IN

## 2020-01-20 DIAGNOSIS — H40.023 OPEN ANGLE WITH BORDERLINE FINDINGS AND HIGH GLAUCOMA RISK IN BOTH EYES: ICD-10-CM

## 2020-01-20 DIAGNOSIS — H10.13 ALLERGIC CONJUNCTIVITIS OF BOTH EYES: Primary | ICD-10-CM

## 2020-01-20 DIAGNOSIS — Z12.39 BREAST CANCER SCREENING: ICD-10-CM

## 2020-01-20 DIAGNOSIS — H52.13 MYOPIA OF BOTH EYES: ICD-10-CM

## 2020-01-20 PROCEDURE — 77067 SCR MAMMO BI INCL CAD: CPT | Mod: 26,,, | Performed by: RADIOLOGY

## 2020-01-20 PROCEDURE — 99999 PR PBB SHADOW E&M-EST. PATIENT-LVL II: ICD-10-PCS | Mod: PBBFAC,,, | Performed by: OPTOMETRIST

## 2020-01-20 PROCEDURE — 77067 SCR MAMMO BI INCL CAD: CPT | Mod: TC

## 2020-01-20 PROCEDURE — 99999 PR PBB SHADOW E&M-EST. PATIENT-LVL II: CPT | Mod: PBBFAC,,, | Performed by: OPTOMETRIST

## 2020-01-20 PROCEDURE — 77063 MAMMO DIGITAL SCREENING BILAT WITH TOMOSYNTHESIS_CAD: ICD-10-PCS | Mod: 26,,, | Performed by: RADIOLOGY

## 2020-01-20 PROCEDURE — 77063 BREAST TOMOSYNTHESIS BI: CPT | Mod: 26,,, | Performed by: RADIOLOGY

## 2020-01-20 PROCEDURE — 77067 MAMMO DIGITAL SCREENING BILAT WITH TOMOSYNTHESIS_CAD: ICD-10-PCS | Mod: 26,,, | Performed by: RADIOLOGY

## 2020-01-20 PROCEDURE — 92015 DETERMINE REFRACTIVE STATE: CPT | Mod: S$GLB,,, | Performed by: OPTOMETRIST

## 2020-01-20 PROCEDURE — 92004 COMPRE OPH EXAM NEW PT 1/>: CPT | Mod: S$GLB,,, | Performed by: OPTOMETRIST

## 2020-01-20 PROCEDURE — 92004 PR EYE EXAM, NEW PATIENT,COMPREHESV: ICD-10-PCS | Mod: S$GLB,,, | Performed by: OPTOMETRIST

## 2020-01-20 PROCEDURE — 92015 PR REFRACTION: ICD-10-PCS | Mod: S$GLB,,, | Performed by: OPTOMETRIST

## 2020-01-20 NOTE — PROGRESS NOTES
HPI     ZELDA unknown. Patient reports itchy, dry eyes for 2 years. Does not use   any eye drops. Has gotten worse recently. Occasional redness. Denies   discharge or light sensitivity. Patient does not wear glasses. Reports   good vision but still interested in getting some glasses. Denies flashes   or floaters.    Last edited by Pallavi Ramires, OD on 1/20/2020  8:29 AM. (History)            Assessment /Plan     For exam results, see Encounter Report.    Allergic conjunctivitis of both eyes    Open angle with borderline findings and high glaucoma risk in both eyes    Myopia of both eyes      1. Recommend Zaditor or Alaway bid OU and cool compresses to help soothe itching. Patient advised to RTC if condition gets worse.   2. Baseline IOP today was OD 16, OS 15. C/d 0.6 OD, 0.6 OS. (+) FHx - maternal aunt. Educated pt on findings. Pt shows understanding.  RTC in 1 month for IOP/Pachy/OCT of ONH/ 24-2 HVF.   3. Prescribe Final SpecRx. Educated patient on small amount of myopia, but patient still interested in getting spec Rx. RTC in 1 yr for annual eye exam or prn.

## 2020-01-28 ENCOUNTER — TELEPHONE (OUTPATIENT)
Dept: OBSTETRICS AND GYNECOLOGY | Facility: CLINIC | Age: 40
End: 2020-01-28

## 2020-01-28 NOTE — TELEPHONE ENCOUNTER
----- Message from Lelo Trevino LPN sent at 2020  9:34 AM CST -----  Chantel Alas, 40 yrs, None  MRN:   [id number]  ::   1980  Lang:   English  Specialty Comments:     Length of Stay (Hrs):   None  Pt Edwin Status:   Edwin  Prim Cvg::   HUMANA/HUMANA PPO  Cvg Last Verified Date:   2020  Patient Types:   None  PCP:   Doretha Hernandes MD  Care Team:     Allergies:   No Known Allergies  Patient Portal:   Active, [employer], 2020 8:20 PM  FYI  None   More Detail >>  FW: Appointment Request  Sundar Alexis  Sent:   8:02 AM  To: LUC Chavez     Chantel Bishop  MRN: [id number] : 1980  Pt Home: [phone]    Entered: [phone]      Message         ----- Message -----  From: Chantel Alas  Sent: 2020   8:27 PM CST  To: , #  Subject: Appointment Request                              Appointment Request From: Chantel Alas    With Provider: Vero Wilkins MD [[facility] Obstetrics and Gynecology]    Preferred Date Range: 2020 - 2020    Preferred Times: Any time    Reason for visit: Existing Patient    Comments:  HPV check up  Primary Coverage (Effective 2019)     Payer Plan Group Number Group Name Effective From Effective To  HUMANA HUMANA  PPO [id number]  2019   Secondary Coverage (Effective 2020)     Payer Plan Group Number Group Name Effective From Effective To  FELIX VISON FELIX VISION   2020   Patient Demographics     Address  [address] [place] [address]  [address]  [place] [address] Phone  [phone] (Home)  [phone] (Mobile) #Preferred# E-mail Address  [email or web address]

## 2020-08-13 ENCOUNTER — TELEPHONE (OUTPATIENT)
Dept: OBSTETRICS AND GYNECOLOGY | Facility: CLINIC | Age: 40
End: 2020-08-13

## 2020-08-20 ENCOUNTER — OFFICE VISIT (OUTPATIENT)
Dept: OBSTETRICS AND GYNECOLOGY | Facility: CLINIC | Age: 40
End: 2020-08-20
Payer: COMMERCIAL

## 2020-08-20 ENCOUNTER — LAB VISIT (OUTPATIENT)
Dept: LAB | Facility: HOSPITAL | Age: 40
End: 2020-08-20
Attending: STUDENT IN AN ORGANIZED HEALTH CARE EDUCATION/TRAINING PROGRAM
Payer: COMMERCIAL

## 2020-08-20 VITALS
BODY MASS INDEX: 30.83 KG/M2 | DIASTOLIC BLOOD PRESSURE: 70 MMHG | SYSTOLIC BLOOD PRESSURE: 98 MMHG | HEIGHT: 64 IN | WEIGHT: 180.56 LBS

## 2020-08-20 DIAGNOSIS — N89.8 VAGINAL IRRITATION: Primary | ICD-10-CM

## 2020-08-20 DIAGNOSIS — Z31.69 ENCOUNTER FOR PRECONCEPTION CONSULTATION: ICD-10-CM

## 2020-08-20 PROCEDURE — 99999 PR PBB SHADOW E&M-EST. PATIENT-LVL III: CPT | Mod: PBBFAC,,, | Performed by: STUDENT IN AN ORGANIZED HEALTH CARE EDUCATION/TRAINING PROGRAM

## 2020-08-20 PROCEDURE — 83520 IMMUNOASSAY QUANT NOS NONAB: CPT

## 2020-08-20 PROCEDURE — 36415 COLL VENOUS BLD VENIPUNCTURE: CPT | Mod: PN

## 2020-08-20 PROCEDURE — 99999 PR PBB SHADOW E&M-EST. PATIENT-LVL III: ICD-10-PCS | Mod: PBBFAC,,, | Performed by: STUDENT IN AN ORGANIZED HEALTH CARE EDUCATION/TRAINING PROGRAM

## 2020-08-20 PROCEDURE — 3008F BODY MASS INDEX DOCD: CPT | Mod: CPTII,S$GLB,, | Performed by: STUDENT IN AN ORGANIZED HEALTH CARE EDUCATION/TRAINING PROGRAM

## 2020-08-20 PROCEDURE — 3008F PR BODY MASS INDEX (BMI) DOCUMENTED: ICD-10-PCS | Mod: CPTII,S$GLB,, | Performed by: STUDENT IN AN ORGANIZED HEALTH CARE EDUCATION/TRAINING PROGRAM

## 2020-08-20 PROCEDURE — 87491 CHLMYD TRACH DNA AMP PROBE: CPT

## 2020-08-20 PROCEDURE — 99213 OFFICE O/P EST LOW 20 MIN: CPT | Mod: S$GLB,,, | Performed by: STUDENT IN AN ORGANIZED HEALTH CARE EDUCATION/TRAINING PROGRAM

## 2020-08-20 PROCEDURE — 99213 PR OFFICE/OUTPT VISIT, EST, LEVL III, 20-29 MIN: ICD-10-PCS | Mod: S$GLB,,, | Performed by: STUDENT IN AN ORGANIZED HEALTH CARE EDUCATION/TRAINING PROGRAM

## 2020-08-20 NOTE — LETTER
August 20, 2020      Juan Schuler MD  [address] [facility] [address]           [facility] - Obstetrics and Gynecology  [address] [facility] [address]  Phone: [phone]  Fax: [phone]          Patient: Chantel Alas   MR Number: [id number]   YOB: 1980   Date of Visit: 8/20/2020       Dear Dr. Juan Schuler:    Thank you for referring Chantel Alas to me for evaluation. Attached you will find relevant portions of my assessment and plan of care.    If you have questions, please do not hesitate to call me. I look forward to following Chantel Alas along with you.    Sincerely,    Vero Wilkins MD    Enclosure  CC:  No Recipients    If you would like to receive this communication electronically, please contact [email or web address] or [phone] to request more information on [employer] Link access.    For providers and/or their staff who would like to refer a patient to [facility], please contact us through our one-stop-shop provider referral line, [facility], at [phone].    If you feel you have received this communication in error or would no longer like to receive these types of communications, please e-mail [email or web address]

## 2020-08-20 NOTE — PATIENT INSTRUCTIONS
Begin prenatal vitamin      Improving Your Fertility  Your doctor may suggest some simple methods to help you get pregnant. Most focus on predicting ovulation. This is the time when sex has the best chance of success. Your doctor may also advise working on other factors that can affect fertility.  Predicting ovulation  Conception is only possible when a woman is ovulating. One signal of ovulation is a surge in the hormone LH. Other signals include changes in body temperature and changes in cervical mucus.  Ovulation predictor kits  One of the best signals of ovulation is a sudden increase in the hormone LH. A simple urine test called an ovulation predictor kit is available at most [facility]. It can help pinpoint this surge. Have sex the day you notice the surge. Keep having sex daily over the next 3 days, or as often as your doctor suggests.  Body temperature changes  A womans resting temperature or basal body temperature increases after ovulation.  By this point in the cycle, having sex will not increase your chances of getting pregnant. However, it is an indication that ovulation occurred.   Cervical mucus changes  Shortly before ovulation a womans cervical mucus gets clear and stretchy. The mucus also increases in quantity. This makes it easier for sperm to travel through the cervix. Not all women notice these changes. But if you do, begin having sex daily until the mucus thickens again.  Working on other factors that affect fertility  Certain lifestyle and health habits can affect fertility. Be sure to talk with your doctor about these issues. You may be able to make some simple changes to improve your chances of conception. Keep in mind, though, that it can take time for healthy changes to improve your fertility.  Weight problems  Being overweight or underweight can affect hormone levels. In women, this can impair ovulation. In men, obesity can decrease sperm count.  Medications, supplements, and herbal  remedies  Medications, supplements, and herbal remedies can affect hormone levels in men and women. They can also affect the quantity and quality of sperm. Be sure to tell your doctor about any substances you take.  Sexually transmitted diseases  Sexually transmitted diseases (STDs) can scar the reproductive organs in both men and women. If youve had an STD, be sure to tell your doctor.  Testicular heat  A mans testicles are normally a few degrees cooler than the rest of his body. When the testicles are too warm, sperm production may decline. Try to avoid excess heat from things like hot tubs and saunas.  Smoking, alcohol, and drugs  Smoking can affect estrogen levels and decrease egg production in women. Men who smoke may have lowered sperm count. Excessive alcohol or drug use can also decrease fertility in both men and women.   Chemical exposure  Certain chemicals can affect hormone levels. Talk with your doctor if youre regularly exposed to strong chemicals. You should also ask about lubricants used during sex. Some types can be toxic to sperm.  Other factors  Excessive exercise can decrease hormone production. It can also cause irregular menstruation in women. Ongoing stress is another factor that may affect fertility and cause ovulation problems.  Date Last Reviewed: 5/21/2015  © 2535-1331 [employer]. [address] [place] [address], [place] [address]. All rights reserved. This information is not intended as a substitute for professional medical care. Always follow your healthcare professional's instructions.

## 2020-08-20 NOTE — PROGRESS NOTES
History & Physical  Gynecology      SUBJECTIVE:     Chief Complaint: Vaginitis       History of Present Illness:  Pt here with concerns of increased vaginal discharge. Slight odor. Been present for a few days  Last week with itching. Change in soaps. Hasn't had it for the past few days. Itching is gone now. Really murali wanted to make sure there wasn't anything abnormal. Has not had sex since last visit with me (10/2019).   Also reports that she wants to have kids in the near future. She does not have a partner and has regular cycles. She tracks cycles, symptoms and discharge and is sure she is ovulating each month.    Review of patient's allergies indicates:  No Known Allergies    Past Medical History:   Diagnosis Date    Herpes simplex virus (HSV) infection      Past Surgical History:   Procedure Laterality Date    DILATION AND CURETTAGE OF UTERUS      WISDOM TOOTH EXTRACTION       OB History        2    Para   1    Term   1            AB   1    Living           SAB   1    TAB        Ectopic        Multiple        Live Births                   Family History   Problem Relation Age of Onset    Hypertension Mother     Hypertension Father     Cancer Maternal Grandmother         leukemia    Leukemia Maternal Grandmother     Cancer Maternal Grandfather     Cancer Paternal Grandmother     Cancer Paternal Grandfather     Glaucoma Maternal Aunt     Breast cancer Neg Hx     Colon cancer Neg Hx     Ovarian cancer Neg Hx      Social History     Tobacco Use    Smoking status: Never Smoker    Smokeless tobacco: Never Used   Substance Use Topics    Alcohol use: Yes     Frequency: 2-4 times a month     Drinks per session: 1 or 2     Binge frequency: Never     Comment: socially    Drug use: Never       No current outpatient medications on file.     No current facility-administered medications for this visit.          Review of Systems:  Review of Systems   Constitutional: Negative for chills  and fever.   Respiratory: Negative for cough and shortness of breath.    Gastrointestinal: Negative for abdominal pain, nausea and vomiting.   Genitourinary: Positive for vaginal discharge and vaginal odor. Negative for dysuria, hematuria, pelvic pain, vaginal bleeding, vaginal pain and vaginal dryness.        OBJECTIVE:     Physical Exam:  Physical Exam  Vitals signs reviewed.   Constitutional:       General: She is not in acute distress.     Appearance: She is well-developed. She is not diaphoretic.   HENT:      Head: Normocephalic and atraumatic.   Eyes:      Conjunctiva/sclera: Conjunctivae normal.   Neck:      Musculoskeletal: Normal range of motion.   Cardiovascular:      Rate and Rhythm: Normal rate.   Pulmonary:      Effort: Pulmonary effort is normal.   Abdominal:      General: There is no distension.      Palpations: Abdomen is soft. There is no mass.      Tenderness: There is no abdominal tenderness. There is no guarding or rebound.   Genitourinary:     Labia:         Right: No rash, tenderness, lesion or injury.         Left: No rash, tenderness, lesion or injury.       Vagina: No signs of injury and foreign body. No vaginal discharge, erythema, tenderness or bleeding.      Cervix: No cervical motion tenderness, discharge or friability.      Uterus: Not deviated, not enlarged, not fixed and not tender.       Adnexa:         Right: No mass, tenderness or fullness.          Left: No mass, tenderness or fullness.     Musculoskeletal: Normal range of motion.   Skin:     General: Skin is warm and dry.   Neurological:      Mental Status: She is alert.           ASSESSMENT:       ICD-10-CM ICD-9-CM    1. Vaginal irritation  N89.8 623.9 Vaginosis Screen by DNA Probe      C. trachomatis/N. gonorrhoeae by AMP DNA   2. Encounter for preconception consultation  Z31.69 V26.49 Antimullerian hormone (AMH)          Plan:        Vaginal discomfort  - GC/CT and affirm collected  - Proper hygiene routines/products  discussed  - Physiologic discharge suspected    Preconception Counseling  - AMH drawn today as patient is 40. Reassured her that if normal, she may have time, but if low, may need to consider fertility counseling.   - Prenatal vitamin recommended  - Cycle mapping and timed intercourse briefly reviewed today      Counseling time: 15 minutes    Vero Wilkins

## 2020-08-25 LAB — MIS SERPL-MCNC: 5.4 NG/ML (ref 0.9–9.5)

## 2020-09-14 LAB
C TRACH DNA SPEC QL NAA+PROBE: NOT DETECTED
N GONORRHOEA DNA SPEC QL NAA+PROBE: NOT DETECTED

## 2020-11-03 ENCOUNTER — OFFICE VISIT (OUTPATIENT)
Dept: OBSTETRICS AND GYNECOLOGY | Facility: CLINIC | Age: 40
End: 2020-11-03
Payer: COMMERCIAL

## 2020-11-03 VITALS
WEIGHT: 171.5 LBS | DIASTOLIC BLOOD PRESSURE: 68 MMHG | BODY MASS INDEX: 29.28 KG/M2 | HEIGHT: 64 IN | SYSTOLIC BLOOD PRESSURE: 102 MMHG

## 2020-11-03 DIAGNOSIS — Z01.419 WELL WOMAN EXAM WITH ROUTINE GYNECOLOGICAL EXAM: Primary | ICD-10-CM

## 2020-11-03 DIAGNOSIS — B97.7 HPV IN FEMALE: ICD-10-CM

## 2020-11-03 PROCEDURE — 99999 PR PBB SHADOW E&M-EST. PATIENT-LVL III: CPT | Mod: PBBFAC,,, | Performed by: STUDENT IN AN ORGANIZED HEALTH CARE EDUCATION/TRAINING PROGRAM

## 2020-11-03 PROCEDURE — 3008F BODY MASS INDEX DOCD: CPT | Mod: CPTII,S$GLB,, | Performed by: STUDENT IN AN ORGANIZED HEALTH CARE EDUCATION/TRAINING PROGRAM

## 2020-11-03 PROCEDURE — 3008F PR BODY MASS INDEX (BMI) DOCUMENTED: ICD-10-PCS | Mod: CPTII,S$GLB,, | Performed by: STUDENT IN AN ORGANIZED HEALTH CARE EDUCATION/TRAINING PROGRAM

## 2020-11-03 PROCEDURE — 99396 PREV VISIT EST AGE 40-64: CPT | Mod: S$GLB,,, | Performed by: STUDENT IN AN ORGANIZED HEALTH CARE EDUCATION/TRAINING PROGRAM

## 2020-11-03 PROCEDURE — 99999 PR PBB SHADOW E&M-EST. PATIENT-LVL III: ICD-10-PCS | Mod: PBBFAC,,, | Performed by: STUDENT IN AN ORGANIZED HEALTH CARE EDUCATION/TRAINING PROGRAM

## 2020-11-03 PROCEDURE — 88175 CYTOPATH C/V AUTO FLUID REDO: CPT

## 2020-11-03 PROCEDURE — 87624 HPV HI-RISK TYP POOLED RSLT: CPT

## 2020-11-03 PROCEDURE — 99396 PR PREVENTIVE VISIT,EST,40-64: ICD-10-PCS | Mod: S$GLB,,, | Performed by: STUDENT IN AN ORGANIZED HEALTH CARE EDUCATION/TRAINING PROGRAM

## 2020-11-03 NOTE — PROGRESS NOTES
History & Physical  Gynecology      SUBJECTIVE:     Chief Complaint: Annual Exam       History of Present Illness:  Chantel is here for annual exam and repeat pap and co test due to other HR positive strains last year. She is no longer with her prior partner. She has not been sexually active since as she fears passing along HSV2. She has never had an outbreak, just has had positive antibodies after finding out her old partner's ex wife had herpes.    Menstrual History: menarche age10, reports periods are regular, every 28 days lasting 5 days. Denies hx of dysmenorrhea or menorrhagia.   LMP: 10/27  STD/STI Hx: HPV  Contraception Hx: none  Sexually Active: no  GYN surgery: none  Family history: Denies any personal or family history of GYN/colon cancers   Social: Wears seatbelts. Exercises. Feels safe at home.  Last pap: 10/2019 normal, HPV positive for other HR strains.  Last mammo: 2020 normal  Flu: not interested   HPV vaccine: not interested  Labs utd  Vitamins: yes      Review of patient's allergies indicates:  No Known Allergies    Past Medical History:   Diagnosis Date    Herpes simplex virus (HSV) infection     never an outbreak     Past Surgical History:   Procedure Laterality Date    DILATION AND CURETTAGE OF UTERUS      WISDOM TOOTH EXTRACTION       OB History        2    Para   1    Term   1            AB   1    Living           SAB   1    TAB        Ectopic        Multiple        Live Births                   Family History   Problem Relation Age of Onset    Hypertension Mother     Hypertension Father     Cancer Maternal Grandmother         leukemia    Leukemia Maternal Grandmother     Cancer Maternal Grandfather     Cancer Paternal Grandmother     Cancer Paternal Grandfather     Glaucoma Maternal Aunt     Breast cancer Neg Hx     Colon cancer Neg Hx     Ovarian cancer Neg Hx      Social History     Tobacco Use    Smoking status: Never Smoker    Smokeless tobacco:  Never Used   Substance Use Topics    Alcohol use: Yes     Frequency: 2-4 times a month     Drinks per session: 1 or 2     Binge frequency: Never     Comment: socially    Drug use: Never       No current outpatient medications on file.     No current facility-administered medications for this visit.          Review of Systems:  Review of Systems   Constitutional: Negative for activity change, appetite change, fever and unexpected weight change.   Eyes: Negative for visual disturbance.   Respiratory: Negative for shortness of breath.    Cardiovascular: Negative for chest pain.   Gastrointestinal: Negative for abdominal pain, blood in stool, constipation, diarrhea, nausea and vomiting.   Genitourinary: Negative for dysmenorrhea, dysuria, hematuria, menorrhagia, menstrual problem, pelvic pain, vaginal bleeding, vaginal discharge, vaginal pain, urinary incontinence and vaginal odor.   Integumentary:  Negative for breast mass.   Neurological: Negative for headaches.   Breast: Negative for lump and mass       OBJECTIVE:     Physical Exam:  Physical Exam  Vitals signs reviewed.   Constitutional:       General: She is not in acute distress.     Appearance: She is well-developed. She is not diaphoretic.   HENT:      Head: Normocephalic and atraumatic.   Eyes:      General: No scleral icterus.        Right eye: No discharge.         Left eye: No discharge.      Conjunctiva/sclera: Conjunctivae normal.   Neck:      Musculoskeletal: Normal range of motion and neck supple.      Thyroid: No thyromegaly.   Cardiovascular:      Rate and Rhythm: Normal rate.   Pulmonary:      Effort: Pulmonary effort is normal.   Chest:      Breasts: Breasts are symmetrical.         Right: No inverted nipple, mass, nipple discharge, skin change or tenderness.         Left: No inverted nipple, mass, nipple discharge, skin change or tenderness.   Abdominal:      General: There is no distension.      Palpations: Abdomen is soft.      Tenderness: There  is no abdominal tenderness.   Genitourinary:     Labia:         Right: No rash, tenderness, lesion or injury.         Left: No rash, tenderness, lesion or injury.       Vagina: Normal. No signs of injury and foreign body. No vaginal discharge, erythema, tenderness or bleeding.      Cervix: No cervical motion tenderness, discharge or friability.      Uterus: Not deviated, not enlarged, not fixed and not tender.       Adnexa:         Right: No mass, tenderness or fullness.          Left: No mass, tenderness or fullness.     Musculoskeletal: Normal range of motion.   Lymphadenopathy:      Cervical: No cervical adenopathy.   Skin:     General: Skin is warm and dry.      Findings: No erythema or rash.   Neurological:      Mental Status: She is alert and oriented to person, place, and time.           ASSESSMENT:       ICD-10-CM ICD-9-CM    1. Well woman exam with routine gynecological exam  Z01.419 V72.31 Liquid-Based Pap Smear, Screening      HPV High Risk Genotypes, PCR      Mammo Digital Screening Bilat w/ Michael   2. HPV in female  B97.7 079.4 Liquid-Based Pap Smear, Screening      HPV High Risk Genotypes, PCR          Plan:      WWE  - Vaccines utd  - Pap and cotest repeated today  - Mammogram ordered for next year  - GC/CT, affirm n/a  - reviewed herpes to reassure patient  - Daily vitamin discussed.  - Consistent condom use discussed.   - CBE normal. Physical exam normal. VSS.  - RTC for annual or PRN.    HPV infection:  Pap and co test repeated  The patient is given a full explanation of the ubiquitous nature of HPV. Some research suggests that at least three out of four people who have sex will get a genital HPV infection at some time in their lives. Sexual contact with an infected partner, regardless of the sex of the partner, is the most common way the virus is spread. Most often there are no signs or symptoms of genital HPV infection. Low risk viruses can cause genital warts, but they do not cause abnormal pap  "smears. High risk viruses can cause abnormal pap smears, but they do not cause genital warts. In most women, the immune system destroys the virus before it causes cancer. But in some women, HPV is not destroyed and can lead to precancer or cancer in those women. There is no "cure" for HPV. Smoking, however, can make it more difficult for your body to clear the virus.      Counseling time: 15 minutes    Vero Wilkins    "

## 2020-11-03 NOTE — PATIENT INSTRUCTIONS
"Persons nine to 18 years of age: 1,300 mg of calcium, 600 IU of vitamin D  Persons 19 to 50 years of age: 1,000 mg of calcium, 600 IU of vitamin D  Persons 51 to 70 years of age: 1,200 mg of calcium, 600 IU of vitamin D  Persons 71 years and older: 1,200 mg of calcium, 800 IU of vitamin D      Ubiquitous nature of HPV. Some research suggests that at least three out of four people who have sex will get a genital HPV infection at some time in their lives. Sexual contact with an infected partner, regardless of the sex of the partner, is the most common way the virus is spread. Most often there are no signs or symptoms of genital HPV infection. Low risk viruses can cause genital warts, but they do not cause abnormal pap smears. High risk viruses can cause abnormal pap smears, but they do not cause genital warts. In most women, the immune system destroys the virus before it causes cancer. But in some women, HPV is not destroyed and can lead to precancer or cancer in those women. There is no "cure" for HPV. Smoking, however, can make it more difficult for your body to clear the virus.    "

## 2020-11-11 LAB
HPV HR 12 DNA SPEC QL NAA+PROBE: POSITIVE
HPV16 AG SPEC QL: NEGATIVE
HPV18 DNA SPEC QL NAA+PROBE: NEGATIVE

## 2020-11-12 ENCOUNTER — PATIENT MESSAGE (OUTPATIENT)
Dept: OBSTETRICS AND GYNECOLOGY | Facility: CLINIC | Age: 40
End: 2020-11-12

## 2020-11-19 ENCOUNTER — LAB VISIT (OUTPATIENT)
Dept: URGENT CARE | Facility: CLINIC | Age: 40
End: 2020-11-19
Payer: COMMERCIAL

## 2020-11-19 DIAGNOSIS — Z11.59 SCREENING FOR VIRAL DISEASE: Primary | ICD-10-CM

## 2020-11-19 LAB
CTP QC/QA: YES
SARS-COV-2 RDRP RESP QL NAA+PROBE: NEGATIVE

## 2020-11-19 PROCEDURE — U0002: ICD-10-PCS | Mod: QW,S$GLB,, | Performed by: PHYSICIAN ASSISTANT

## 2020-11-19 PROCEDURE — U0002 COVID-19 LAB TEST NON-CDC: HCPCS | Mod: QW,S$GLB,, | Performed by: PHYSICIAN ASSISTANT

## 2020-12-01 ENCOUNTER — PATIENT MESSAGE (OUTPATIENT)
Dept: OBSTETRICS AND GYNECOLOGY | Facility: CLINIC | Age: 40
End: 2020-12-01

## 2020-12-09 LAB
FINAL PATHOLOGIC DIAGNOSIS: NORMAL
Lab: NORMAL

## 2020-12-11 ENCOUNTER — TELEPHONE (OUTPATIENT)
Dept: OBSTETRICS AND GYNECOLOGY | Facility: CLINIC | Age: 40
End: 2020-12-11

## 2020-12-11 NOTE — TELEPHONE ENCOUNTER
Called patient and left message on voicemail to give us a call in order to schedule a procedure appt.

## 2020-12-14 ENCOUNTER — CLINICAL SUPPORT (OUTPATIENT)
Dept: URGENT CARE | Facility: CLINIC | Age: 40
End: 2020-12-14
Payer: COMMERCIAL

## 2020-12-14 DIAGNOSIS — Z11.9 SCREENING EXAMINATION FOR UNSPECIFIED INFECTIOUS DISEASE: Primary | ICD-10-CM

## 2020-12-14 DIAGNOSIS — Z03.818 ENCOUNTER FOR OBSERVATION FOR SUSPECTED EXPOSURE TO OTHER BIOLOGICAL AGENTS RULED OUT: ICD-10-CM

## 2020-12-14 LAB
CTP QC/QA: YES
SARS-COV-2 RDRP RESP QL NAA+PROBE: NEGATIVE

## 2020-12-14 PROCEDURE — U0002 COVID-19 LAB TEST NON-CDC: HCPCS | Mod: QW,S$GLB,, | Performed by: FAMILY MEDICINE

## 2020-12-14 PROCEDURE — U0002: ICD-10-PCS | Mod: QW,S$GLB,, | Performed by: FAMILY MEDICINE

## 2020-12-17 ENCOUNTER — OFFICE VISIT (OUTPATIENT)
Dept: INTERNAL MEDICINE | Facility: CLINIC | Age: 40
End: 2020-12-17
Payer: COMMERCIAL

## 2020-12-17 VITALS
DIASTOLIC BLOOD PRESSURE: 60 MMHG | BODY MASS INDEX: 29.4 KG/M2 | WEIGHT: 172.19 LBS | SYSTOLIC BLOOD PRESSURE: 94 MMHG | HEART RATE: 68 BPM | HEIGHT: 64 IN | TEMPERATURE: 98 F | RESPIRATION RATE: 20 BRPM

## 2020-12-17 DIAGNOSIS — F43.23 ADJUSTMENT DISORDER WITH MIXED ANXIETY AND DEPRESSED MOOD: ICD-10-CM

## 2020-12-17 DIAGNOSIS — Z00.00 ANNUAL PHYSICAL EXAM: Primary | ICD-10-CM

## 2020-12-17 PROCEDURE — 99999 PR PBB SHADOW E&M-EST. PATIENT-LVL III: ICD-10-PCS | Mod: PBBFAC,,, | Performed by: HOSPITALIST

## 2020-12-17 PROCEDURE — 99396 PR PREVENTIVE VISIT,EST,40-64: ICD-10-PCS | Mod: S$GLB,,, | Performed by: HOSPITALIST

## 2020-12-17 PROCEDURE — 99999 PR PBB SHADOW E&M-EST. PATIENT-LVL III: CPT | Mod: PBBFAC,,, | Performed by: HOSPITALIST

## 2020-12-17 PROCEDURE — 99396 PREV VISIT EST AGE 40-64: CPT | Mod: S$GLB,,, | Performed by: HOSPITALIST

## 2020-12-17 PROCEDURE — 1126F AMNT PAIN NOTED NONE PRSNT: CPT | Mod: S$GLB,,, | Performed by: HOSPITALIST

## 2020-12-17 PROCEDURE — 3008F BODY MASS INDEX DOCD: CPT | Mod: CPTII,S$GLB,, | Performed by: HOSPITALIST

## 2020-12-17 PROCEDURE — 3008F PR BODY MASS INDEX (BMI) DOCUMENTED: ICD-10-PCS | Mod: CPTII,S$GLB,, | Performed by: HOSPITALIST

## 2020-12-17 PROCEDURE — 1126F PR PAIN SEVERITY QUANTIFIED, NO PAIN PRESENT: ICD-10-PCS | Mod: S$GLB,,, | Performed by: HOSPITALIST

## 2020-12-17 NOTE — PROGRESS NOTES
Subjective:     @Patient ID: Chantel Alas is a 40 y.o. female.    Chief Complaint: Annual Exam    HPI  40y.o. female here for annual exam. She works as a  for [employer]      1. Anxiety/depression adjustment d/o: reports during pandemic increased stressors. Has felt anxious/depressed. Is teaching hybrid classes since pandemic  2. Eyelid itchy has seen optometry for this. Has not started Rx that was recommended by them       Lipid disorders/ASCVD risk (ages >/= 45 or >/= 20 if increased risk ): ordered  DM (>45y yearly or if obese, HTN): A1c   Eye exam: n/a  Breast Cancer (40-50y discretion of pt, 50-74y every 1-2 years): Mammogram DUE 2020  Cervical Cancer (Pap Smear ages 21-65 every 3 years or Pap + HPV q5 years after 30 years of age): Done 10/15/2019        Vaccines:   Influenza (yearly) declines  Tetanus (every 10 yrs - 1st tdap)           Exercise: none  Diet:  regular    Review of Systems   Constitutional: Negative for activity change and unexpected weight change.   HENT: Negative for hearing loss, rhinorrhea and trouble swallowing.    Eyes: Positive for discharge. Negative for visual disturbance.   Respiratory: Negative for chest tightness and wheezing.    Cardiovascular: Negative for chest pain and palpitations.   Gastrointestinal: Negative for blood in stool, constipation, diarrhea and vomiting.   Endocrine: Negative for polydipsia and polyuria.   Genitourinary: Negative for difficulty urinating, dysuria, hematuria and menstrual problem.   Musculoskeletal: Negative for arthralgias, joint swelling and neck pain.   Neurological: Negative for weakness and headaches.   Psychiatric/Behavioral: Positive for dysphoric mood. Negative for confusion.     Past medical history, surgical history, and family medical history reviewed and updated as appropriate.    Medications and allergies reviewed.     Objective:     There were no vitals filed for this visit.  There is no height or weight on file to  calculate BMI.  Physical Exam  Vitals signs reviewed.   Constitutional:       General: She is not in acute distress.     Appearance: She is well-developed.   HENT:      Head: Normocephalic and atraumatic.      Right Ear: Tympanic membrane normal.      Left Ear: Tympanic membrane normal.      Mouth/Throat:      Mouth: Mucous membranes are moist.      Pharynx: No oropharyngeal exudate.   Eyes:      General:         Right eye: No discharge.         Left eye: No discharge.      Conjunctiva/sclera: Conjunctivae normal.   Neck:      Musculoskeletal: Normal range of motion and neck supple.   Cardiovascular:      Rate and Rhythm: Normal rate and regular rhythm.      Heart sounds: No murmur. No friction rub.   Pulmonary:      Effort: Pulmonary effort is normal.      Breath sounds: Normal breath sounds.   Abdominal:      General: Bowel sounds are normal. There is no distension.      Palpations: Abdomen is soft.      Tenderness: There is no abdominal tenderness. There is no guarding.   Musculoskeletal: Normal range of motion.      Right lower leg: No edema.      Left lower leg: No edema.   Lymphadenopathy:      Cervical: No cervical adenopathy.   Skin:     General: Skin is warm and dry.   Neurological:      Mental Status: She is alert and oriented to person, place, and time.   Psychiatric:         Mood and Affect: Mood normal.         Behavior: Behavior normal.         Lab Results   Component Value Date    WBC 8.01 11/25/2019    HGB 11.2 (L) 11/25/2019    HCT 37.3 11/25/2019     11/25/2019    CHOL 139 11/25/2019    TRIG 55 11/25/2019    HDL 56 11/25/2019    ALT 13 11/25/2019    AST 20 11/25/2019     11/25/2019    K 4.5 11/25/2019     11/25/2019    CREATININE 0.7 11/25/2019    BUN 10 11/25/2019    CO2 28 11/25/2019    TSH 1.631 11/25/2019    HGBA1C 5.2 11/25/2019       Assessment:     1. Annual physical exam    2. Adjustment disorder with mixed anxiety and depressed mood      Plan:   Chantel was seen today for  annual exam.    Diagnoses and all orders for this visit:    Annual physical exam  -     Comprehensive Metabolic Panel; Future  -     CBC Auto Differential; Future  -     TSH; Future  -     Vitamin D; Future  -     Lipid Panel; Future    Adjustment disorder with mixed anxiety and depressed mood  - She declines to start medication at this time.    -     Ambulatory referral/consult to Psychiatry; Future    Other orders  -     Cancel: (In Office Administered) Td Vaccine - Preservative Free        RTC 1 year and prn     Doretha Hernandes MD  Internal Medicine    12/17/2020

## 2020-12-18 ENCOUNTER — LAB VISIT (OUTPATIENT)
Dept: LAB | Facility: HOSPITAL | Age: 40
End: 2020-12-18
Attending: HOSPITALIST
Payer: COMMERCIAL

## 2020-12-18 DIAGNOSIS — Z00.00 ANNUAL PHYSICAL EXAM: ICD-10-CM

## 2020-12-18 LAB
25(OH)D3+25(OH)D2 SERPL-MCNC: 9 NG/ML (ref 30–96)
ALBUMIN SERPL BCP-MCNC: 4 G/DL (ref 3.5–5.2)
ALP SERPL-CCNC: 61 U/L (ref 55–135)
ALT SERPL W/O P-5'-P-CCNC: 11 U/L (ref 10–44)
ANION GAP SERPL CALC-SCNC: 10 MMOL/L (ref 8–16)
AST SERPL-CCNC: 19 U/L (ref 10–40)
BASOPHILS # BLD AUTO: 0.06 K/UL (ref 0–0.2)
BASOPHILS NFR BLD: 0.7 % (ref 0–1.9)
BILIRUB SERPL-MCNC: 0.5 MG/DL (ref 0.1–1)
BUN SERPL-MCNC: 7 MG/DL (ref 6–20)
CALCIUM SERPL-MCNC: 9.2 MG/DL (ref 8.7–10.5)
CHLORIDE SERPL-SCNC: 104 MMOL/L (ref 95–110)
CHOLEST SERPL-MCNC: 129 MG/DL (ref 120–199)
CHOLEST/HDLC SERPL: 2.5 {RATIO} (ref 2–5)
CO2 SERPL-SCNC: 24 MMOL/L (ref 23–29)
CREAT SERPL-MCNC: 0.8 MG/DL (ref 0.5–1.4)
DIFFERENTIAL METHOD: ABNORMAL
EOSINOPHIL # BLD AUTO: 0.3 K/UL (ref 0–0.5)
EOSINOPHIL NFR BLD: 3.3 % (ref 0–8)
ERYTHROCYTE [DISTWIDTH] IN BLOOD BY AUTOMATED COUNT: 16.2 % (ref 11.5–14.5)
EST. GFR  (AFRICAN AMERICAN): >60 ML/MIN/1.73 M^2
EST. GFR  (NON AFRICAN AMERICAN): >60 ML/MIN/1.73 M^2
GLUCOSE SERPL-MCNC: 72 MG/DL (ref 70–110)
HCT VFR BLD AUTO: 33.4 % (ref 37–48.5)
HDLC SERPL-MCNC: 51 MG/DL (ref 40–75)
HDLC SERPL: 39.5 % (ref 20–50)
HGB BLD-MCNC: 10.1 G/DL (ref 12–16)
IMM GRANULOCYTES # BLD AUTO: 0.02 K/UL (ref 0–0.04)
IMM GRANULOCYTES NFR BLD AUTO: 0.2 % (ref 0–0.5)
LDLC SERPL CALC-MCNC: 70.4 MG/DL (ref 63–159)
LYMPHOCYTES # BLD AUTO: 3.6 K/UL (ref 1–4.8)
LYMPHOCYTES NFR BLD: 42.1 % (ref 18–48)
MCH RBC QN AUTO: 24.8 PG (ref 27–31)
MCHC RBC AUTO-ENTMCNC: 30.2 G/DL (ref 32–36)
MCV RBC AUTO: 82 FL (ref 82–98)
MONOCYTES # BLD AUTO: 0.6 K/UL (ref 0.3–1)
MONOCYTES NFR BLD: 6.9 % (ref 4–15)
NEUTROPHILS # BLD AUTO: 3.9 K/UL (ref 1.8–7.7)
NEUTROPHILS NFR BLD: 46.8 % (ref 38–73)
NONHDLC SERPL-MCNC: 78 MG/DL
NRBC BLD-RTO: 0 /100 WBC
PLATELET # BLD AUTO: 299 K/UL (ref 150–350)
PMV BLD AUTO: 11.2 FL (ref 9.2–12.9)
POTASSIUM SERPL-SCNC: 4.2 MMOL/L (ref 3.5–5.1)
PROT SERPL-MCNC: 7 G/DL (ref 6–8.4)
RBC # BLD AUTO: 4.07 M/UL (ref 4–5.4)
SODIUM SERPL-SCNC: 138 MMOL/L (ref 136–145)
TRIGL SERPL-MCNC: 38 MG/DL (ref 30–150)
TSH SERPL DL<=0.005 MIU/L-ACNC: 1.28 UIU/ML (ref 0.4–4)
WBC # BLD AUTO: 8.43 K/UL (ref 3.9–12.7)

## 2020-12-18 PROCEDURE — 85025 COMPLETE CBC W/AUTO DIFF WBC: CPT

## 2020-12-18 PROCEDURE — 80061 LIPID PANEL: CPT

## 2020-12-18 PROCEDURE — 84443 ASSAY THYROID STIM HORMONE: CPT

## 2020-12-18 PROCEDURE — 80053 COMPREHEN METABOLIC PANEL: CPT

## 2020-12-18 PROCEDURE — 82306 VITAMIN D 25 HYDROXY: CPT

## 2020-12-18 PROCEDURE — 36415 COLL VENOUS BLD VENIPUNCTURE: CPT | Mod: PO

## 2020-12-22 ENCOUNTER — PATIENT MESSAGE (OUTPATIENT)
Dept: INTERNAL MEDICINE | Facility: CLINIC | Age: 40
End: 2020-12-22

## 2020-12-22 DIAGNOSIS — D64.9 NORMOCYTIC ANEMIA: Primary | ICD-10-CM

## 2021-01-05 ENCOUNTER — LAB VISIT (OUTPATIENT)
Dept: LAB | Facility: HOSPITAL | Age: 41
End: 2021-01-05
Attending: HOSPITALIST
Payer: COMMERCIAL

## 2021-01-05 DIAGNOSIS — D64.9 NORMOCYTIC ANEMIA: ICD-10-CM

## 2021-01-05 LAB
FERRITIN SERPL-MCNC: 10 NG/ML (ref 20–300)
IRON SERPL-MCNC: 34 UG/DL (ref 30–160)
SATURATED IRON: 7 % (ref 20–50)
TOTAL IRON BINDING CAPACITY: 522 UG/DL (ref 250–450)
TRANSFERRIN SERPL-MCNC: 353 MG/DL (ref 200–375)
TRANSFERRIN SERPL-MCNC: 353 MG/DL (ref 200–375)

## 2021-01-05 PROCEDURE — 36415 COLL VENOUS BLD VENIPUNCTURE: CPT | Mod: PO

## 2021-01-05 PROCEDURE — 83540 ASSAY OF IRON: CPT

## 2021-01-05 PROCEDURE — 82728 ASSAY OF FERRITIN: CPT

## 2021-01-07 ENCOUNTER — PATIENT MESSAGE (OUTPATIENT)
Dept: INTERNAL MEDICINE | Facility: CLINIC | Age: 41
End: 2021-01-07

## 2021-01-07 DIAGNOSIS — D50.9 IRON DEFICIENCY ANEMIA, UNSPECIFIED IRON DEFICIENCY ANEMIA TYPE: Primary | ICD-10-CM

## 2021-01-07 RX ORDER — FERROUS SULFATE 325(65) MG
325 TABLET ORAL
Refills: 0 | COMMUNITY
Start: 2021-01-07

## 2021-01-12 ENCOUNTER — OFFICE VISIT (OUTPATIENT)
Dept: OBSTETRICS AND GYNECOLOGY | Facility: CLINIC | Age: 41
End: 2021-01-12
Payer: COMMERCIAL

## 2021-01-12 VITALS
SYSTOLIC BLOOD PRESSURE: 102 MMHG | WEIGHT: 172.81 LBS | HEIGHT: 64 IN | BODY MASS INDEX: 29.5 KG/M2 | DIASTOLIC BLOOD PRESSURE: 78 MMHG

## 2021-01-12 DIAGNOSIS — B97.7 HPV IN FEMALE: Primary | ICD-10-CM

## 2021-01-12 LAB
B-HCG UR QL: NEGATIVE
CTP QC/QA: YES

## 2021-01-12 PROCEDURE — 99499 NO LOS: ICD-10-PCS | Mod: S$GLB,,, | Performed by: STUDENT IN AN ORGANIZED HEALTH CARE EDUCATION/TRAINING PROGRAM

## 2021-01-12 PROCEDURE — 88305 TISSUE EXAM BY PATHOLOGIST: CPT | Mod: 59 | Performed by: PATHOLOGY

## 2021-01-12 PROCEDURE — 1126F PR PAIN SEVERITY QUANTIFIED, NO PAIN PRESENT: ICD-10-PCS | Mod: S$GLB,ICN,, | Performed by: STUDENT IN AN ORGANIZED HEALTH CARE EDUCATION/TRAINING PROGRAM

## 2021-01-12 PROCEDURE — 57454 COLPOSCOPY: ICD-10-PCS | Mod: S$GLB,ICN,, | Performed by: STUDENT IN AN ORGANIZED HEALTH CARE EDUCATION/TRAINING PROGRAM

## 2021-01-12 PROCEDURE — 57454 BX/CURETT OF CERVIX W/SCOPE: CPT | Mod: S$GLB,ICN,, | Performed by: STUDENT IN AN ORGANIZED HEALTH CARE EDUCATION/TRAINING PROGRAM

## 2021-01-12 PROCEDURE — 88305 TISSUE EXAM BY PATHOLOGIST: ICD-10-PCS | Mod: 26,,, | Performed by: PATHOLOGY

## 2021-01-12 PROCEDURE — 99499 UNLISTED E&M SERVICE: CPT | Mod: S$GLB,,, | Performed by: STUDENT IN AN ORGANIZED HEALTH CARE EDUCATION/TRAINING PROGRAM

## 2021-01-12 PROCEDURE — 1126F AMNT PAIN NOTED NONE PRSNT: CPT | Mod: S$GLB,ICN,, | Performed by: STUDENT IN AN ORGANIZED HEALTH CARE EDUCATION/TRAINING PROGRAM

## 2021-01-12 PROCEDURE — 99999 PR PBB SHADOW E&M-EST. PATIENT-LVL II: CPT | Mod: PBBFAC,,, | Performed by: STUDENT IN AN ORGANIZED HEALTH CARE EDUCATION/TRAINING PROGRAM

## 2021-01-12 PROCEDURE — 99999 PR PBB SHADOW E&M-EST. PATIENT-LVL II: ICD-10-PCS | Mod: PBBFAC,,, | Performed by: STUDENT IN AN ORGANIZED HEALTH CARE EDUCATION/TRAINING PROGRAM

## 2021-01-12 PROCEDURE — 88305 TISSUE EXAM BY PATHOLOGIST: CPT | Mod: 26,,, | Performed by: PATHOLOGY

## 2021-01-12 PROCEDURE — 3008F PR BODY MASS INDEX (BMI) DOCUMENTED: ICD-10-PCS | Mod: CPTII,S$GLB,ICN, | Performed by: STUDENT IN AN ORGANIZED HEALTH CARE EDUCATION/TRAINING PROGRAM

## 2021-01-12 PROCEDURE — 3008F BODY MASS INDEX DOCD: CPT | Mod: CPTII,S$GLB,ICN, | Performed by: STUDENT IN AN ORGANIZED HEALTH CARE EDUCATION/TRAINING PROGRAM

## 2021-01-19 ENCOUNTER — PATIENT MESSAGE (OUTPATIENT)
Dept: INTERNAL MEDICINE | Facility: CLINIC | Age: 41
End: 2021-01-19

## 2021-01-20 ENCOUNTER — HOSPITAL ENCOUNTER (EMERGENCY)
Facility: HOSPITAL | Age: 41
Discharge: HOME | End: 2021-01-20
Attending: EMERGENCY MEDICINE
Payer: COMMERCIAL

## 2021-01-20 ENCOUNTER — TELEPHONE (OUTPATIENT)
Dept: INTERNAL MEDICINE | Facility: CLINIC | Age: 41
End: 2021-01-20

## 2021-01-20 VITALS
BODY MASS INDEX: 28.17 KG/M2 | RESPIRATION RATE: 16 BRPM | HEIGHT: 64 IN | WEIGHT: 165 LBS | OXYGEN SATURATION: 98 % | HEART RATE: 80 BPM | SYSTOLIC BLOOD PRESSURE: 132 MMHG | DIASTOLIC BLOOD PRESSURE: 92 MMHG | TEMPERATURE: 98 F

## 2021-01-20 DIAGNOSIS — R20.0 LEFT SIDED NUMBNESS: Primary | ICD-10-CM

## 2021-01-20 LAB
ALBUMIN SERPL BCP-MCNC: 4 G/DL (ref 3.5–5.2)
ALP SERPL-CCNC: 74 U/L (ref 55–135)
ALT SERPL W/O P-5'-P-CCNC: 9 U/L (ref 10–44)
ANION GAP SERPL CALC-SCNC: 9 MMOL/L (ref 8–16)
AST SERPL-CCNC: 21 U/L (ref 10–40)
B-HCG UR QL: NEGATIVE
BASOPHILS # BLD AUTO: 0.07 K/UL (ref 0–0.2)
BASOPHILS NFR BLD: 0.6 % (ref 0–1.9)
BILIRUB SERPL-MCNC: 0.2 MG/DL (ref 0.1–1)
BUN SERPL-MCNC: 13 MG/DL (ref 6–20)
CALCIUM SERPL-MCNC: 9 MG/DL (ref 8.7–10.5)
CHLORIDE SERPL-SCNC: 103 MMOL/L (ref 95–110)
CO2 SERPL-SCNC: 27 MMOL/L (ref 23–29)
CREAT SERPL-MCNC: 0.8 MG/DL (ref 0.5–1.4)
CTP QC/QA: YES
DIFFERENTIAL METHOD: ABNORMAL
EOSINOPHIL # BLD AUTO: 0.5 K/UL (ref 0–0.5)
EOSINOPHIL NFR BLD: 4.2 % (ref 0–8)
ERYTHROCYTE [DISTWIDTH] IN BLOOD BY AUTOMATED COUNT: 18.4 % (ref 11.5–14.5)
EST. GFR  (AFRICAN AMERICAN): >60 ML/MIN/1.73 M^2
EST. GFR  (NON AFRICAN AMERICAN): >60 ML/MIN/1.73 M^2
GLUCOSE SERPL-MCNC: 80 MG/DL (ref 70–110)
HCT VFR BLD AUTO: 37.7 % (ref 37–48.5)
HGB BLD-MCNC: 11.5 G/DL (ref 12–16)
IMM GRANULOCYTES # BLD AUTO: 0.04 K/UL (ref 0–0.04)
IMM GRANULOCYTES NFR BLD AUTO: 0.4 % (ref 0–0.5)
LYMPHOCYTES # BLD AUTO: 3.7 K/UL (ref 1–4.8)
LYMPHOCYTES NFR BLD: 33 % (ref 18–48)
MAGNESIUM SERPL-MCNC: 2.1 MG/DL (ref 1.6–2.6)
MCH RBC QN AUTO: 25.5 PG (ref 27–31)
MCHC RBC AUTO-ENTMCNC: 30.5 G/DL (ref 32–36)
MCV RBC AUTO: 84 FL (ref 82–98)
MONOCYTES # BLD AUTO: 0.7 K/UL (ref 0.3–1)
MONOCYTES NFR BLD: 6.4 % (ref 4–15)
NEUTROPHILS # BLD AUTO: 6.1 K/UL (ref 1.8–7.7)
NEUTROPHILS NFR BLD: 55.4 % (ref 38–73)
NRBC BLD-RTO: 0 /100 WBC
PLATELET # BLD AUTO: 351 K/UL (ref 150–350)
PMV BLD AUTO: 10.7 FL (ref 9.2–12.9)
POTASSIUM SERPL-SCNC: 4.3 MMOL/L (ref 3.5–5.1)
PROT SERPL-MCNC: 7.7 G/DL (ref 6–8.4)
RBC # BLD AUTO: 4.51 M/UL (ref 4–5.4)
SODIUM SERPL-SCNC: 139 MMOL/L (ref 136–145)
WBC # BLD AUTO: 11.07 K/UL (ref 3.9–12.7)

## 2021-01-20 PROCEDURE — 80053 COMPREHEN METABOLIC PANEL: CPT

## 2021-01-20 PROCEDURE — 85025 COMPLETE CBC W/AUTO DIFF WBC: CPT

## 2021-01-20 PROCEDURE — 99284 PR EMERGENCY DEPT VISIT,LEVEL IV: ICD-10-PCS | Mod: ,,, | Performed by: PHYSICIAN ASSISTANT

## 2021-01-20 PROCEDURE — 81025 URINE PREGNANCY TEST: CPT | Performed by: PHYSICIAN ASSISTANT

## 2021-01-20 PROCEDURE — 25500020 PHARM REV CODE 255: Performed by: EMERGENCY MEDICINE

## 2021-01-20 PROCEDURE — 99285 EMERGENCY DEPT VISIT HI MDM: CPT | Mod: 25

## 2021-01-20 PROCEDURE — 99284 EMERGENCY DEPT VISIT MOD MDM: CPT | Mod: ,,, | Performed by: PHYSICIAN ASSISTANT

## 2021-01-20 PROCEDURE — 83735 ASSAY OF MAGNESIUM: CPT

## 2021-01-20 PROCEDURE — A9585 GADOBUTROL INJECTION: HCPCS | Performed by: EMERGENCY MEDICINE

## 2021-01-20 RX ORDER — GADOBUTROL 604.72 MG/ML
8 INJECTION INTRAVENOUS
Status: COMPLETED | OUTPATIENT
Start: 2021-01-20 | End: 2021-01-20

## 2021-01-20 RX ADMIN — GADOBUTROL 8 ML: 604.72 INJECTION INTRAVENOUS at 07:01

## 2021-01-21 ENCOUNTER — OFFICE VISIT (OUTPATIENT)
Dept: INTERNAL MEDICINE | Facility: CLINIC | Age: 41
End: 2021-01-21
Payer: COMMERCIAL

## 2021-01-21 VITALS
DIASTOLIC BLOOD PRESSURE: 68 MMHG | WEIGHT: 176.13 LBS | SYSTOLIC BLOOD PRESSURE: 102 MMHG | HEART RATE: 88 BPM | BODY MASS INDEX: 30.07 KG/M2 | HEIGHT: 64 IN

## 2021-01-21 DIAGNOSIS — R20.0 LEFT SIDED NUMBNESS: Primary | ICD-10-CM

## 2021-01-21 DIAGNOSIS — R93.89 ABNORMAL MRI: ICD-10-CM

## 2021-01-21 LAB
FINAL PATHOLOGIC DIAGNOSIS: NORMAL
GROSS: NORMAL

## 2021-01-21 PROCEDURE — 99214 PR OFFICE/OUTPT VISIT, EST, LEVL IV, 30-39 MIN: ICD-10-PCS | Mod: S$GLB,,, | Performed by: NURSE PRACTITIONER

## 2021-01-21 PROCEDURE — 99214 OFFICE O/P EST MOD 30 MIN: CPT | Mod: S$GLB,,, | Performed by: NURSE PRACTITIONER

## 2021-01-21 PROCEDURE — 99999 PR PBB SHADOW E&M-EST. PATIENT-LVL III: ICD-10-PCS | Mod: PBBFAC,,, | Performed by: NURSE PRACTITIONER

## 2021-01-21 PROCEDURE — 99999 PR PBB SHADOW E&M-EST. PATIENT-LVL III: CPT | Mod: PBBFAC,,, | Performed by: NURSE PRACTITIONER

## 2021-01-21 PROCEDURE — 3008F PR BODY MASS INDEX (BMI) DOCUMENTED: ICD-10-PCS | Mod: CPTII,S$GLB,, | Performed by: NURSE PRACTITIONER

## 2021-01-21 PROCEDURE — 3008F BODY MASS INDEX DOCD: CPT | Mod: CPTII,S$GLB,, | Performed by: NURSE PRACTITIONER

## 2021-01-22 ENCOUNTER — PATIENT MESSAGE (OUTPATIENT)
Dept: INTERNAL MEDICINE | Facility: CLINIC | Age: 41
End: 2021-01-22

## 2021-01-25 ENCOUNTER — PATIENT MESSAGE (OUTPATIENT)
Dept: INTERNAL MEDICINE | Facility: CLINIC | Age: 41
End: 2021-01-25

## 2021-01-25 DIAGNOSIS — R20.2 PARESTHESIA OF LEFT UPPER AND LOWER EXTREMITY: ICD-10-CM

## 2021-01-25 DIAGNOSIS — R20.2 PARESTHESIA OF LEFT LOWER EXTREMITY: Primary | ICD-10-CM

## 2021-01-26 ENCOUNTER — LAB VISIT (OUTPATIENT)
Dept: LAB | Facility: HOSPITAL | Age: 41
End: 2021-01-26
Attending: HOSPITALIST
Payer: COMMERCIAL

## 2021-01-26 ENCOUNTER — PATIENT OUTREACH (OUTPATIENT)
Dept: ADMINISTRATIVE | Facility: OTHER | Age: 41
End: 2021-01-26

## 2021-01-26 DIAGNOSIS — R20.2 PARESTHESIA OF LEFT UPPER AND LOWER EXTREMITY: ICD-10-CM

## 2021-01-26 PROCEDURE — 82746 ASSAY OF FOLIC ACID SERUM: CPT

## 2021-01-26 PROCEDURE — 36415 COLL VENOUS BLD VENIPUNCTURE: CPT | Mod: PO

## 2021-01-26 PROCEDURE — 82607 VITAMIN B-12: CPT

## 2021-01-27 LAB
FOLATE SERPL-MCNC: 8.5 NG/ML (ref 4–24)
VIT B12 SERPL-MCNC: 410 PG/ML (ref 210–950)

## 2021-01-28 ENCOUNTER — OFFICE VISIT (OUTPATIENT)
Dept: OPTOMETRY | Facility: CLINIC | Age: 41
End: 2021-01-28
Payer: COMMERCIAL

## 2021-01-28 DIAGNOSIS — Z01.00 ROUTINE EYE EXAM: Primary | ICD-10-CM

## 2021-01-28 DIAGNOSIS — H40.023 OPEN ANGLE WITH BORDERLINE FINDINGS AND HIGH GLAUCOMA RISK IN BOTH EYES: ICD-10-CM

## 2021-01-28 PROCEDURE — 99999 PR PBB SHADOW E&M-EST. PATIENT-LVL II: CPT | Mod: PBBFAC,,, | Performed by: OPTOMETRIST

## 2021-01-28 PROCEDURE — 92015 DETERMINE REFRACTIVE STATE: CPT | Mod: S$GLB,,, | Performed by: OPTOMETRIST

## 2021-01-28 PROCEDURE — 92015 PR REFRACTION: ICD-10-PCS | Mod: S$GLB,,, | Performed by: OPTOMETRIST

## 2021-01-28 PROCEDURE — 92014 COMPRE OPH EXAM EST PT 1/>: CPT | Mod: S$GLB,,, | Performed by: OPTOMETRIST

## 2021-01-28 PROCEDURE — 92014 PR EYE EXAM, EST PATIENT,COMPREHESV: ICD-10-PCS | Mod: S$GLB,,, | Performed by: OPTOMETRIST

## 2021-01-28 PROCEDURE — 99999 PR PBB SHADOW E&M-EST. PATIENT-LVL II: ICD-10-PCS | Mod: PBBFAC,,, | Performed by: OPTOMETRIST

## 2021-01-29 ENCOUNTER — PATIENT MESSAGE (OUTPATIENT)
Dept: INTERNAL MEDICINE | Facility: CLINIC | Age: 41
End: 2021-01-29

## 2021-01-29 ENCOUNTER — HOSPITAL ENCOUNTER (OUTPATIENT)
Dept: RADIOLOGY | Facility: HOSPITAL | Age: 41
Discharge: HOME | End: 2021-01-29
Attending: STUDENT IN AN ORGANIZED HEALTH CARE EDUCATION/TRAINING PROGRAM
Payer: COMMERCIAL

## 2021-01-29 VITALS — WEIGHT: 176 LBS | HEIGHT: 64 IN | BODY MASS INDEX: 30.05 KG/M2

## 2021-01-29 DIAGNOSIS — Z01.419 WELL WOMAN EXAM WITH ROUTINE GYNECOLOGICAL EXAM: ICD-10-CM

## 2021-01-29 PROCEDURE — 77067 SCR MAMMO BI INCL CAD: CPT | Mod: 26,,, | Performed by: RADIOLOGY

## 2021-01-29 PROCEDURE — 77067 SCR MAMMO BI INCL CAD: CPT | Mod: TC

## 2021-01-29 PROCEDURE — 77067 MAMMO DIGITAL SCREENING BILAT WITH TOMO: ICD-10-PCS | Mod: 26,,, | Performed by: RADIOLOGY

## 2021-01-29 PROCEDURE — 77063 BREAST TOMOSYNTHESIS BI: CPT | Mod: 26,,, | Performed by: RADIOLOGY

## 2021-01-29 PROCEDURE — 77063 MAMMO DIGITAL SCREENING BILAT WITH TOMO: ICD-10-PCS | Mod: 26,,, | Performed by: RADIOLOGY

## 2021-03-16 ENCOUNTER — OFFICE VISIT (OUTPATIENT)
Dept: NEUROLOGY | Facility: CLINIC | Age: 41
End: 2021-03-16
Payer: COMMERCIAL

## 2021-03-16 ENCOUNTER — LAB VISIT (OUTPATIENT)
Dept: LAB | Facility: HOSPITAL | Age: 41
End: 2021-03-16
Payer: COMMERCIAL

## 2021-03-16 VITALS
BODY MASS INDEX: 30.04 KG/M2 | SYSTOLIC BLOOD PRESSURE: 105 MMHG | WEIGHT: 175.94 LBS | HEIGHT: 64 IN | DIASTOLIC BLOOD PRESSURE: 74 MMHG | HEART RATE: 99 BPM

## 2021-03-16 DIAGNOSIS — G37.9 DEMYELINATING DISEASE OF CENTRAL NERVOUS SYSTEM, UNSPECIFIED: ICD-10-CM

## 2021-03-16 DIAGNOSIS — Z01.84 ENCOUNTER FOR ANTIBODY RESPONSE EXAMINATION: ICD-10-CM

## 2021-03-16 DIAGNOSIS — R20.0 LEFT SIDED NUMBNESS: Primary | ICD-10-CM

## 2021-03-16 DIAGNOSIS — G35 MULTIPLE SCLEROSIS: ICD-10-CM

## 2021-03-16 DIAGNOSIS — R20.0 LEFT SIDED NUMBNESS: ICD-10-CM

## 2021-03-16 LAB
CRP SERPL-MCNC: 0.6 MG/L (ref 0–8.2)
ERYTHROCYTE [SEDIMENTATION RATE] IN BLOOD BY WESTERGREN METHOD: 13 MM/HR (ref 0–36)
SARS-COV-2 IGG SERPLBLD QL IA.RAPID: NEGATIVE
VIT B12 SERPL-MCNC: 316 PG/ML (ref 210–950)

## 2021-03-16 PROCEDURE — 99204 PR OFFICE/OUTPT VISIT, NEW, LEVL IV, 45-59 MIN: ICD-10-PCS | Mod: S$GLB,,, | Performed by: PSYCHIATRY & NEUROLOGY

## 2021-03-16 PROCEDURE — 82607 VITAMIN B-12: CPT | Performed by: PSYCHIATRY & NEUROLOGY

## 2021-03-16 PROCEDURE — 99999 PR PBB SHADOW E&M-EST. PATIENT-LVL III: ICD-10-PCS | Mod: PBBFAC,,, | Performed by: PSYCHIATRY & NEUROLOGY

## 2021-03-16 PROCEDURE — 84207 ASSAY OF VITAMIN B-6: CPT | Performed by: PSYCHIATRY & NEUROLOGY

## 2021-03-16 PROCEDURE — 3008F PR BODY MASS INDEX (BMI) DOCUMENTED: ICD-10-PCS | Mod: CPTII,S$GLB,, | Performed by: PSYCHIATRY & NEUROLOGY

## 2021-03-16 PROCEDURE — 3008F BODY MASS INDEX DOCD: CPT | Mod: CPTII,S$GLB,, | Performed by: PSYCHIATRY & NEUROLOGY

## 2021-03-16 PROCEDURE — 1126F AMNT PAIN NOTED NONE PRSNT: CPT | Mod: S$GLB,,, | Performed by: PSYCHIATRY & NEUROLOGY

## 2021-03-16 PROCEDURE — 84425 ASSAY OF VITAMIN B-1: CPT | Performed by: PSYCHIATRY & NEUROLOGY

## 2021-03-16 PROCEDURE — 99999 PR PBB SHADOW E&M-EST. PATIENT-LVL III: CPT | Mod: PBBFAC,,, | Performed by: PSYCHIATRY & NEUROLOGY

## 2021-03-16 PROCEDURE — 85652 RBC SED RATE AUTOMATED: CPT | Performed by: PSYCHIATRY & NEUROLOGY

## 2021-03-16 PROCEDURE — 99204 OFFICE O/P NEW MOD 45 MIN: CPT | Mod: S$GLB,,, | Performed by: PSYCHIATRY & NEUROLOGY

## 2021-03-16 PROCEDURE — 86769 SARS-COV-2 COVID-19 ANTIBODY: CPT | Performed by: PSYCHIATRY & NEUROLOGY

## 2021-03-16 PROCEDURE — 1126F PR PAIN SEVERITY QUANTIFIED, NO PAIN PRESENT: ICD-10-PCS | Mod: S$GLB,,, | Performed by: PSYCHIATRY & NEUROLOGY

## 2021-03-16 PROCEDURE — 86140 C-REACTIVE PROTEIN: CPT | Performed by: PSYCHIATRY & NEUROLOGY

## 2021-03-22 LAB
PYRIDOXAL SERPL-MCNC: 18 UG/L (ref 5–50)
VIT B1 BLD-MCNC: 33 UG/L (ref 38–122)

## 2021-03-26 ENCOUNTER — TELEPHONE (OUTPATIENT)
Dept: NEUROLOGY | Facility: CLINIC | Age: 41
End: 2021-03-26

## 2021-04-08 ENCOUNTER — LAB VISIT (OUTPATIENT)
Dept: LAB | Facility: HOSPITAL | Age: 41
End: 2021-04-08
Attending: HOSPITALIST
Payer: COMMERCIAL

## 2021-04-08 DIAGNOSIS — D50.9 IRON DEFICIENCY ANEMIA, UNSPECIFIED IRON DEFICIENCY ANEMIA TYPE: ICD-10-CM

## 2021-04-08 LAB
BASOPHILS # BLD AUTO: 0.06 K/UL (ref 0–0.2)
BASOPHILS NFR BLD: 0.7 % (ref 0–1.9)
DIFFERENTIAL METHOD: ABNORMAL
EOSINOPHIL # BLD AUTO: 0.4 K/UL (ref 0–0.5)
EOSINOPHIL NFR BLD: 5.1 % (ref 0–8)
ERYTHROCYTE [DISTWIDTH] IN BLOOD BY AUTOMATED COUNT: 15.9 % (ref 11.5–14.5)
HCT VFR BLD AUTO: 37.4 % (ref 37–48.5)
HGB BLD-MCNC: 11.3 G/DL (ref 12–16)
IMM GRANULOCYTES # BLD AUTO: 0.03 K/UL (ref 0–0.04)
IMM GRANULOCYTES NFR BLD AUTO: 0.4 % (ref 0–0.5)
LYMPHOCYTES # BLD AUTO: 3.2 K/UL (ref 1–4.8)
LYMPHOCYTES NFR BLD: 37.9 % (ref 18–48)
MCH RBC QN AUTO: 25.8 PG (ref 27–31)
MCHC RBC AUTO-ENTMCNC: 30.2 G/DL (ref 32–36)
MCV RBC AUTO: 85 FL (ref 82–98)
MONOCYTES # BLD AUTO: 0.6 K/UL (ref 0.3–1)
MONOCYTES NFR BLD: 6.6 % (ref 4–15)
NEUTROPHILS # BLD AUTO: 4.1 K/UL (ref 1.8–7.7)
NEUTROPHILS NFR BLD: 49.3 % (ref 38–73)
NRBC BLD-RTO: 0 /100 WBC
PLATELET # BLD AUTO: 306 K/UL (ref 150–450)
PMV BLD AUTO: 10.6 FL (ref 9.2–12.9)
RBC # BLD AUTO: 4.38 M/UL (ref 4–5.4)
WBC # BLD AUTO: 8.36 K/UL (ref 3.9–12.7)

## 2021-04-08 PROCEDURE — 85025 COMPLETE CBC W/AUTO DIFF WBC: CPT | Performed by: HOSPITALIST

## 2021-04-08 PROCEDURE — 83540 ASSAY OF IRON: CPT | Performed by: HOSPITALIST

## 2021-04-08 PROCEDURE — 82728 ASSAY OF FERRITIN: CPT | Performed by: HOSPITALIST

## 2021-04-08 PROCEDURE — 36415 COLL VENOUS BLD VENIPUNCTURE: CPT | Mod: PO | Performed by: HOSPITALIST

## 2021-04-09 ENCOUNTER — PATIENT OUTREACH (OUTPATIENT)
Dept: ADMINISTRATIVE | Facility: OTHER | Age: 41
End: 2021-04-09

## 2021-04-09 ENCOUNTER — PATIENT MESSAGE (OUTPATIENT)
Dept: INTERNAL MEDICINE | Facility: CLINIC | Age: 41
End: 2021-04-09

## 2021-04-09 DIAGNOSIS — D50.9 IRON DEFICIENCY ANEMIA, UNSPECIFIED IRON DEFICIENCY ANEMIA TYPE: Primary | ICD-10-CM

## 2021-04-09 LAB
FERRITIN SERPL-MCNC: 6 NG/ML (ref 20–300)
IRON SERPL-MCNC: 51 UG/DL (ref 30–160)
SATURATED IRON: 10 % (ref 20–50)
TOTAL IRON BINDING CAPACITY: 502 UG/DL (ref 250–450)
TRANSFERRIN SERPL-MCNC: 339 MG/DL (ref 200–375)
TRANSFERRIN SERPL-MCNC: 339 MG/DL (ref 200–375)

## 2021-05-17 ENCOUNTER — OFFICE VISIT (OUTPATIENT)
Dept: NEUROLOGY | Facility: CLINIC | Age: 41
End: 2021-05-17
Payer: COMMERCIAL

## 2021-05-17 VITALS
SYSTOLIC BLOOD PRESSURE: 118 MMHG | BODY MASS INDEX: 30.04 KG/M2 | HEART RATE: 88 BPM | DIASTOLIC BLOOD PRESSURE: 81 MMHG | HEIGHT: 64 IN | WEIGHT: 175.94 LBS

## 2021-05-17 DIAGNOSIS — R20.2 NUMBNESS AND TINGLING IN LEFT ARM: Primary | ICD-10-CM

## 2021-05-17 DIAGNOSIS — R20.0 NUMBNESS AND TINGLING IN LEFT ARM: Primary | ICD-10-CM

## 2021-05-17 PROCEDURE — 3008F BODY MASS INDEX DOCD: CPT | Mod: CPTII,S$GLB,, | Performed by: PSYCHIATRY & NEUROLOGY

## 2021-05-17 PROCEDURE — 1126F PR PAIN SEVERITY QUANTIFIED, NO PAIN PRESENT: ICD-10-PCS | Mod: S$GLB,,, | Performed by: PSYCHIATRY & NEUROLOGY

## 2021-05-17 PROCEDURE — 99499 UNLISTED E&M SERVICE: CPT | Mod: S$GLB,,, | Performed by: PSYCHIATRY & NEUROLOGY

## 2021-05-17 PROCEDURE — 1126F AMNT PAIN NOTED NONE PRSNT: CPT | Mod: S$GLB,,, | Performed by: PSYCHIATRY & NEUROLOGY

## 2021-05-17 PROCEDURE — 99999 PR PBB SHADOW E&M-EST. PATIENT-LVL II: CPT | Mod: PBBFAC,,, | Performed by: PSYCHIATRY & NEUROLOGY

## 2021-05-17 PROCEDURE — 3008F PR BODY MASS INDEX (BMI) DOCUMENTED: ICD-10-PCS | Mod: CPTII,S$GLB,, | Performed by: PSYCHIATRY & NEUROLOGY

## 2021-05-17 PROCEDURE — 99499 NO LOS: ICD-10-PCS | Mod: S$GLB,,, | Performed by: PSYCHIATRY & NEUROLOGY

## 2021-05-17 PROCEDURE — 99999 PR PBB SHADOW E&M-EST. PATIENT-LVL II: ICD-10-PCS | Mod: PBBFAC,,, | Performed by: PSYCHIATRY & NEUROLOGY

## 2021-05-19 ENCOUNTER — HOSPITAL ENCOUNTER (EMERGENCY)
Facility: HOSPITAL | Age: 41
Discharge: HOME | End: 2021-05-19
Attending: EMERGENCY MEDICINE
Payer: COMMERCIAL

## 2021-05-19 VITALS
WEIGHT: 170 LBS | TEMPERATURE: 98 F | HEIGHT: 64 IN | SYSTOLIC BLOOD PRESSURE: 118 MMHG | HEART RATE: 70 BPM | DIASTOLIC BLOOD PRESSURE: 56 MMHG | RESPIRATION RATE: 16 BRPM | BODY MASS INDEX: 29.02 KG/M2 | OXYGEN SATURATION: 100 %

## 2021-05-19 DIAGNOSIS — R55 SYNCOPE: ICD-10-CM

## 2021-05-19 DIAGNOSIS — R20.0 NUMBNESS: Primary | ICD-10-CM

## 2021-05-19 DIAGNOSIS — R42 DIZZINESS: ICD-10-CM

## 2021-05-19 LAB
ALBUMIN SERPL BCP-MCNC: 3.7 G/DL (ref 3.5–5.2)
ALP SERPL-CCNC: 70 U/L (ref 55–135)
ALT SERPL W/O P-5'-P-CCNC: 10 U/L (ref 10–44)
ANION GAP SERPL CALC-SCNC: 9 MMOL/L (ref 8–16)
AST SERPL-CCNC: 19 U/L (ref 10–40)
B-HCG UR QL: NEGATIVE
BASOPHILS # BLD AUTO: 0.06 K/UL (ref 0–0.2)
BASOPHILS NFR BLD: 0.8 % (ref 0–1.9)
BILIRUB SERPL-MCNC: 0.3 MG/DL (ref 0.1–1)
BUN SERPL-MCNC: 9 MG/DL (ref 6–20)
CALCIUM SERPL-MCNC: 9.5 MG/DL (ref 8.7–10.5)
CHLORIDE SERPL-SCNC: 105 MMOL/L (ref 95–110)
CO2 SERPL-SCNC: 24 MMOL/L (ref 23–29)
CREAT SERPL-MCNC: 0.8 MG/DL (ref 0.5–1.4)
CTP QC/QA: YES
DIFFERENTIAL METHOD: ABNORMAL
EOSINOPHIL # BLD AUTO: 0.4 K/UL (ref 0–0.5)
EOSINOPHIL NFR BLD: 5.2 % (ref 0–8)
ERYTHROCYTE [DISTWIDTH] IN BLOOD BY AUTOMATED COUNT: 15.9 % (ref 11.5–14.5)
EST. GFR  (AFRICAN AMERICAN): >60 ML/MIN/1.73 M^2
EST. GFR  (NON AFRICAN AMERICAN): >60 ML/MIN/1.73 M^2
GLUCOSE SERPL-MCNC: 87 MG/DL (ref 70–110)
HCT VFR BLD AUTO: 36.5 % (ref 37–48.5)
HCV AB SERPL QL IA: NEGATIVE
HGB BLD-MCNC: 11.3 G/DL (ref 12–16)
HIV 1+2 AB+HIV1 P24 AG SERPL QL IA: NEGATIVE
IMM GRANULOCYTES # BLD AUTO: 0.02 K/UL (ref 0–0.04)
IMM GRANULOCYTES NFR BLD AUTO: 0.3 % (ref 0–0.5)
LYMPHOCYTES # BLD AUTO: 2.5 K/UL (ref 1–4.8)
LYMPHOCYTES NFR BLD: 33 % (ref 18–48)
MCH RBC QN AUTO: 26.4 PG (ref 27–31)
MCHC RBC AUTO-ENTMCNC: 31 G/DL (ref 32–36)
MCV RBC AUTO: 85 FL (ref 82–98)
MONOCYTES # BLD AUTO: 0.6 K/UL (ref 0.3–1)
MONOCYTES NFR BLD: 7.3 % (ref 4–15)
NEUTROPHILS # BLD AUTO: 4.1 K/UL (ref 1.8–7.7)
NEUTROPHILS NFR BLD: 53.4 % (ref 38–73)
NRBC BLD-RTO: 0 /100 WBC
PLATELET # BLD AUTO: 340 K/UL (ref 150–450)
PMV BLD AUTO: 10.2 FL (ref 9.2–12.9)
POTASSIUM SERPL-SCNC: 4.2 MMOL/L (ref 3.5–5.1)
PROT SERPL-MCNC: 7.1 G/DL (ref 6–8.4)
RBC # BLD AUTO: 4.28 M/UL (ref 4–5.4)
SODIUM SERPL-SCNC: 138 MMOL/L (ref 136–145)
WBC # BLD AUTO: 7.64 K/UL (ref 3.9–12.7)

## 2021-05-19 PROCEDURE — 96360 HYDRATION IV INFUSION INIT: CPT

## 2021-05-19 PROCEDURE — 93010 ELECTROCARDIOGRAM REPORT: CPT | Mod: ,,, | Performed by: INTERNAL MEDICINE

## 2021-05-19 PROCEDURE — 99284 EMERGENCY DEPT VISIT MOD MDM: CPT | Mod: 25

## 2021-05-19 PROCEDURE — 93005 ELECTROCARDIOGRAM TRACING: CPT

## 2021-05-19 PROCEDURE — 99284 EMERGENCY DEPT VISIT MOD MDM: CPT | Mod: ,,, | Performed by: EMERGENCY MEDICINE

## 2021-05-19 PROCEDURE — 85025 COMPLETE CBC W/AUTO DIFF WBC: CPT | Performed by: STUDENT IN AN ORGANIZED HEALTH CARE EDUCATION/TRAINING PROGRAM

## 2021-05-19 PROCEDURE — 86703 HIV-1/HIV-2 1 RESULT ANTBDY: CPT | Performed by: EMERGENCY MEDICINE

## 2021-05-19 PROCEDURE — 81025 URINE PREGNANCY TEST: CPT | Performed by: EMERGENCY MEDICINE

## 2021-05-19 PROCEDURE — 99284 PR EMERGENCY DEPT VISIT,LEVEL IV: ICD-10-PCS | Mod: ,,, | Performed by: EMERGENCY MEDICINE

## 2021-05-19 PROCEDURE — 86803 HEPATITIS C AB TEST: CPT | Performed by: EMERGENCY MEDICINE

## 2021-05-19 PROCEDURE — 80053 COMPREHEN METABOLIC PANEL: CPT | Performed by: STUDENT IN AN ORGANIZED HEALTH CARE EDUCATION/TRAINING PROGRAM

## 2021-05-19 PROCEDURE — 93010 EKG 12-LEAD: ICD-10-PCS | Mod: ,,, | Performed by: INTERNAL MEDICINE

## 2021-05-19 PROCEDURE — 25000003 PHARM REV CODE 250: Performed by: STUDENT IN AN ORGANIZED HEALTH CARE EDUCATION/TRAINING PROGRAM

## 2021-05-19 RX ORDER — KETOROLAC TROMETHAMINE 10 MG/1
10 TABLET, FILM COATED ORAL
Status: COMPLETED | OUTPATIENT
Start: 2021-05-19 | End: 2021-05-19

## 2021-05-19 RX ORDER — BUTALBITAL, ACETAMINOPHEN AND CAFFEINE 50; 325; 40 MG/1; MG/1; MG/1
1 TABLET ORAL EVERY 4 HOURS PRN
Qty: 12 TABLET | Refills: 0 | Status: SHIPPED | OUTPATIENT
Start: 2021-05-19 | End: 2022-11-30

## 2021-05-19 RX ADMIN — SODIUM CHLORIDE 1000 ML: 0.9 INJECTION, SOLUTION INTRAVENOUS at 10:05

## 2021-05-19 RX ADMIN — KETOROLAC TROMETHAMINE 10 MG: 10 TABLET, FILM COATED ORAL at 10:05

## 2021-05-24 ENCOUNTER — TELEPHONE (OUTPATIENT)
Dept: OPTOMETRY | Facility: CLINIC | Age: 41
End: 2021-05-24

## 2021-05-26 ENCOUNTER — HOSPITAL ENCOUNTER (OUTPATIENT)
Dept: RADIOLOGY | Facility: HOSPITAL | Age: 41
Discharge: HOME | End: 2021-05-26
Attending: PSYCHIATRY & NEUROLOGY
Payer: COMMERCIAL

## 2021-05-26 DIAGNOSIS — G35 MULTIPLE SCLEROSIS: ICD-10-CM

## 2021-05-26 DIAGNOSIS — G37.9 DEMYELINATING DISEASE OF CENTRAL NERVOUS SYSTEM, UNSPECIFIED: ICD-10-CM

## 2021-05-26 PROCEDURE — 72141 MRI NECK SPINE W/O DYE: CPT | Mod: 26,,, | Performed by: RADIOLOGY

## 2021-05-26 PROCEDURE — 70551 MRI BRAIN STEM W/O DYE: CPT | Mod: 26,,, | Performed by: RADIOLOGY

## 2021-05-26 PROCEDURE — 72141 MRI NECK SPINE W/O DYE: CPT | Mod: TC

## 2021-05-26 PROCEDURE — 72141 MRI CERVICAL SPINE DEMYELINATING WITHOUT CONTRAST: ICD-10-PCS | Mod: 26,,, | Performed by: RADIOLOGY

## 2021-05-26 PROCEDURE — 70551 MRI BRAIN STEM W/O DYE: CPT | Mod: TC

## 2021-05-26 PROCEDURE — 70551 MRI BRAIN DEMYELINATING WITHOUT CONTRAST: ICD-10-PCS | Mod: 26,,, | Performed by: RADIOLOGY

## 2021-06-15 ENCOUNTER — PATIENT OUTREACH (OUTPATIENT)
Dept: ADMINISTRATIVE | Facility: OTHER | Age: 41
End: 2021-06-15

## 2021-06-16 ENCOUNTER — CLINICAL SUPPORT (OUTPATIENT)
Dept: OPHTHALMOLOGY | Facility: CLINIC | Age: 41
End: 2021-06-16
Payer: COMMERCIAL

## 2021-06-16 ENCOUNTER — OFFICE VISIT (OUTPATIENT)
Dept: OPTOMETRY | Facility: CLINIC | Age: 41
End: 2021-06-16
Attending: OPTOMETRIST
Payer: COMMERCIAL

## 2021-06-16 DIAGNOSIS — H40.023 OPEN ANGLE WITH BORDERLINE FINDINGS AND HIGH GLAUCOMA RISK IN BOTH EYES: ICD-10-CM

## 2021-06-16 PROCEDURE — 92012 INTRM OPH EXAM EST PATIENT: CPT | Mod: S$GLB,,, | Performed by: OPTOMETRIST

## 2021-06-16 PROCEDURE — 92133 CPTRZD OPH DX IMG PST SGM ON: CPT | Mod: S$GLB,,, | Performed by: OPTOMETRIST

## 2021-06-16 PROCEDURE — 92133 POSTERIOR SEGMENT OCT OPTIC NERVE(OCULAR COHERENCE TOMOGRAPHY) - OU - BOTH EYES: ICD-10-PCS | Mod: S$GLB,,, | Performed by: OPTOMETRIST

## 2021-06-16 PROCEDURE — 92083 EXTENDED VISUAL FIELD XM: CPT | Mod: S$GLB,,, | Performed by: OPTOMETRIST

## 2021-06-16 PROCEDURE — 92020 GONIOSCOPY: CPT | Mod: S$GLB,,, | Performed by: OPTOMETRIST

## 2021-06-16 PROCEDURE — 92083 HUMPHREY VISUAL FIELD - OU - BOTH EYES: ICD-10-PCS | Mod: S$GLB,,, | Performed by: OPTOMETRIST

## 2021-06-16 PROCEDURE — 1126F PR PAIN SEVERITY QUANTIFIED, NO PAIN PRESENT: ICD-10-PCS | Mod: S$GLB,,, | Performed by: OPTOMETRIST

## 2021-06-16 PROCEDURE — 99999 PR PBB SHADOW E&M-EST. PATIENT-LVL II: CPT | Mod: PBBFAC,,, | Performed by: OPTOMETRIST

## 2021-06-16 PROCEDURE — 92012 PR EYE EXAM, EST PATIENT,INTERMED: ICD-10-PCS | Mod: S$GLB,,, | Performed by: OPTOMETRIST

## 2021-06-16 PROCEDURE — 92020 PR SPECIAL EYE EVAL,GONIOSCOPY: ICD-10-PCS | Mod: S$GLB,,, | Performed by: OPTOMETRIST

## 2021-06-16 PROCEDURE — 1126F AMNT PAIN NOTED NONE PRSNT: CPT | Mod: S$GLB,,, | Performed by: OPTOMETRIST

## 2021-06-16 PROCEDURE — 99999 PR PBB SHADOW E&M-EST. PATIENT-LVL II: ICD-10-PCS | Mod: PBBFAC,,, | Performed by: OPTOMETRIST

## 2021-10-26 ENCOUNTER — TELEPHONE (OUTPATIENT)
Dept: NEUROLOGY | Facility: CLINIC | Age: 41
End: 2021-10-26
Payer: COMMERCIAL

## 2021-11-09 ENCOUNTER — PATIENT OUTREACH (OUTPATIENT)
Dept: ADMINISTRATIVE | Facility: OTHER | Age: 41
End: 2021-11-09
Payer: COMMERCIAL

## 2021-11-10 ENCOUNTER — OFFICE VISIT (OUTPATIENT)
Dept: OBSTETRICS AND GYNECOLOGY | Facility: CLINIC | Age: 41
End: 2021-11-10
Payer: COMMERCIAL

## 2021-11-10 VITALS
SYSTOLIC BLOOD PRESSURE: 122 MMHG | HEIGHT: 64 IN | DIASTOLIC BLOOD PRESSURE: 76 MMHG | BODY MASS INDEX: 31.17 KG/M2 | WEIGHT: 182.56 LBS

## 2021-11-10 DIAGNOSIS — Z01.419 WELL WOMAN EXAM WITH ROUTINE GYNECOLOGICAL EXAM: Primary | ICD-10-CM

## 2021-11-10 PROCEDURE — 99396 PR PREVENTIVE VISIT,EST,40-64: ICD-10-PCS | Mod: S$GLB,,, | Performed by: STUDENT IN AN ORGANIZED HEALTH CARE EDUCATION/TRAINING PROGRAM

## 2021-11-10 PROCEDURE — 3074F SYST BP LT 130 MM HG: CPT | Mod: CPTII,S$GLB,, | Performed by: STUDENT IN AN ORGANIZED HEALTH CARE EDUCATION/TRAINING PROGRAM

## 2021-11-10 PROCEDURE — 3078F PR MOST RECENT DIASTOLIC BLOOD PRESSURE < 80 MM HG: ICD-10-PCS | Mod: CPTII,S$GLB,, | Performed by: STUDENT IN AN ORGANIZED HEALTH CARE EDUCATION/TRAINING PROGRAM

## 2021-11-10 PROCEDURE — 3008F BODY MASS INDEX DOCD: CPT | Mod: CPTII,S$GLB,, | Performed by: STUDENT IN AN ORGANIZED HEALTH CARE EDUCATION/TRAINING PROGRAM

## 2021-11-10 PROCEDURE — 99999 PR PBB SHADOW E&M-EST. PATIENT-LVL III: ICD-10-PCS | Mod: PBBFAC,,, | Performed by: STUDENT IN AN ORGANIZED HEALTH CARE EDUCATION/TRAINING PROGRAM

## 2021-11-10 PROCEDURE — 3074F PR MOST RECENT SYSTOLIC BLOOD PRESSURE < 130 MM HG: ICD-10-PCS | Mod: CPTII,S$GLB,, | Performed by: STUDENT IN AN ORGANIZED HEALTH CARE EDUCATION/TRAINING PROGRAM

## 2021-11-10 PROCEDURE — 3008F PR BODY MASS INDEX (BMI) DOCUMENTED: ICD-10-PCS | Mod: CPTII,S$GLB,, | Performed by: STUDENT IN AN ORGANIZED HEALTH CARE EDUCATION/TRAINING PROGRAM

## 2021-11-10 PROCEDURE — 3078F DIAST BP <80 MM HG: CPT | Mod: CPTII,S$GLB,, | Performed by: STUDENT IN AN ORGANIZED HEALTH CARE EDUCATION/TRAINING PROGRAM

## 2021-11-10 PROCEDURE — 1159F MED LIST DOCD IN RCRD: CPT | Mod: CPTII,S$GLB,, | Performed by: STUDENT IN AN ORGANIZED HEALTH CARE EDUCATION/TRAINING PROGRAM

## 2021-11-10 PROCEDURE — 87529 HSV DNA AMP PROBE: CPT | Performed by: STUDENT IN AN ORGANIZED HEALTH CARE EDUCATION/TRAINING PROGRAM

## 2021-11-10 PROCEDURE — 1160F RVW MEDS BY RX/DR IN RCRD: CPT | Mod: CPTII,S$GLB,, | Performed by: STUDENT IN AN ORGANIZED HEALTH CARE EDUCATION/TRAINING PROGRAM

## 2021-11-10 PROCEDURE — 1159F PR MEDICATION LIST DOCUMENTED IN MEDICAL RECORD: ICD-10-PCS | Mod: CPTII,S$GLB,, | Performed by: STUDENT IN AN ORGANIZED HEALTH CARE EDUCATION/TRAINING PROGRAM

## 2021-11-10 PROCEDURE — 99396 PREV VISIT EST AGE 40-64: CPT | Mod: S$GLB,,, | Performed by: STUDENT IN AN ORGANIZED HEALTH CARE EDUCATION/TRAINING PROGRAM

## 2021-11-10 PROCEDURE — 99999 PR PBB SHADOW E&M-EST. PATIENT-LVL III: CPT | Mod: PBBFAC,,, | Performed by: STUDENT IN AN ORGANIZED HEALTH CARE EDUCATION/TRAINING PROGRAM

## 2021-11-10 PROCEDURE — 88175 CYTOPATH C/V AUTO FLUID REDO: CPT | Performed by: STUDENT IN AN ORGANIZED HEALTH CARE EDUCATION/TRAINING PROGRAM

## 2021-11-10 PROCEDURE — 1160F PR REVIEW ALL MEDS BY PRESCRIBER/CLIN PHARMACIST DOCUMENTED: ICD-10-PCS | Mod: CPTII,S$GLB,, | Performed by: STUDENT IN AN ORGANIZED HEALTH CARE EDUCATION/TRAINING PROGRAM

## 2021-11-10 PROCEDURE — 87624 HPV HI-RISK TYP POOLED RSLT: CPT | Performed by: STUDENT IN AN ORGANIZED HEALTH CARE EDUCATION/TRAINING PROGRAM

## 2021-11-13 LAB
HSV PCR SPECIMEN SOURCE: NORMAL
HSV1 PCR RESULT: NOT DETECTED
HSV2 PCR RESULT: NOT DETECTED

## 2021-11-19 LAB
FINAL PATHOLOGIC DIAGNOSIS: NORMAL
Lab: NORMAL

## 2021-12-17 ENCOUNTER — OFFICE VISIT (OUTPATIENT)
Dept: INTERNAL MEDICINE | Facility: CLINIC | Age: 41
End: 2021-12-17
Payer: COMMERCIAL

## 2021-12-17 VITALS
HEART RATE: 80 BPM | TEMPERATURE: 97 F | OXYGEN SATURATION: 100 % | RESPIRATION RATE: 18 BRPM | SYSTOLIC BLOOD PRESSURE: 100 MMHG | HEIGHT: 64 IN | WEIGHT: 184.5 LBS | BODY MASS INDEX: 31.5 KG/M2 | DIASTOLIC BLOOD PRESSURE: 52 MMHG

## 2021-12-17 DIAGNOSIS — Z00.00 ANNUAL PHYSICAL EXAM: Primary | ICD-10-CM

## 2021-12-17 DIAGNOSIS — B37.31 VAGINAL YEAST INFECTION: ICD-10-CM

## 2021-12-17 DIAGNOSIS — F43.23 ADJUSTMENT DISORDER WITH MIXED ANXIETY AND DEPRESSED MOOD: ICD-10-CM

## 2021-12-17 DIAGNOSIS — D50.9 IRON DEFICIENCY ANEMIA, UNSPECIFIED IRON DEFICIENCY ANEMIA TYPE: ICD-10-CM

## 2021-12-17 PROCEDURE — 99396 PREV VISIT EST AGE 40-64: CPT | Mod: 25,S$GLB,, | Performed by: HOSPITALIST

## 2021-12-17 PROCEDURE — 99999 PR PBB SHADOW E&M-EST. PATIENT-LVL IV: CPT | Mod: PBBFAC,,, | Performed by: HOSPITALIST

## 2021-12-17 PROCEDURE — 90715 TDAP VACCINE 7 YRS/> IM: CPT | Mod: S$GLB,,, | Performed by: HOSPITALIST

## 2021-12-17 PROCEDURE — 90715 TDAP VACCINE GREATER THAN OR EQUAL TO 7YO IM: ICD-10-PCS | Mod: S$GLB,,, | Performed by: HOSPITALIST

## 2021-12-17 PROCEDURE — 90471 TDAP VACCINE GREATER THAN OR EQUAL TO 7YO IM: ICD-10-PCS | Mod: S$GLB,,, | Performed by: HOSPITALIST

## 2021-12-17 PROCEDURE — 99999 PR PBB SHADOW E&M-EST. PATIENT-LVL IV: ICD-10-PCS | Mod: PBBFAC,,, | Performed by: HOSPITALIST

## 2021-12-17 PROCEDURE — 90471 IMMUNIZATION ADMIN: CPT | Mod: S$GLB,,, | Performed by: HOSPITALIST

## 2021-12-17 PROCEDURE — 99396 PR PREVENTIVE VISIT,EST,40-64: ICD-10-PCS | Mod: 25,S$GLB,, | Performed by: HOSPITALIST

## 2021-12-17 RX ORDER — FLUCONAZOLE 150 MG/1
150 TABLET ORAL ONCE
Qty: 2 TABLET | Refills: 0 | Status: SHIPPED | OUTPATIENT
Start: 2021-12-17 | End: 2021-12-17

## 2021-12-30 ENCOUNTER — LAB VISIT (OUTPATIENT)
Dept: LAB | Facility: HOSPITAL | Age: 41
End: 2021-12-30
Attending: HOSPITALIST
Payer: COMMERCIAL

## 2021-12-30 DIAGNOSIS — D50.9 IRON DEFICIENCY ANEMIA, UNSPECIFIED IRON DEFICIENCY ANEMIA TYPE: ICD-10-CM

## 2021-12-30 DIAGNOSIS — Z00.00 ANNUAL PHYSICAL EXAM: ICD-10-CM

## 2021-12-30 LAB
ALBUMIN SERPL BCP-MCNC: 3.6 G/DL (ref 3.5–5.2)
ALP SERPL-CCNC: 67 U/L (ref 55–135)
ALT SERPL W/O P-5'-P-CCNC: 28 U/L (ref 10–44)
ANION GAP SERPL CALC-SCNC: 6 MMOL/L (ref 8–16)
AST SERPL-CCNC: 29 U/L (ref 10–40)
BASOPHILS # BLD AUTO: 0.05 K/UL (ref 0–0.2)
BASOPHILS NFR BLD: 0.6 % (ref 0–1.9)
BILIRUB SERPL-MCNC: 0.3 MG/DL (ref 0.1–1)
BUN SERPL-MCNC: 7 MG/DL (ref 6–20)
CALCIUM SERPL-MCNC: 8.8 MG/DL (ref 8.7–10.5)
CHLORIDE SERPL-SCNC: 107 MMOL/L (ref 95–110)
CHOLEST SERPL-MCNC: 162 MG/DL (ref 120–199)
CHOLEST/HDLC SERPL: 3.1 {RATIO} (ref 2–5)
CO2 SERPL-SCNC: 26 MMOL/L (ref 23–29)
CREAT SERPL-MCNC: 0.7 MG/DL (ref 0.5–1.4)
DIFFERENTIAL METHOD: ABNORMAL
EOSINOPHIL # BLD AUTO: 0.3 K/UL (ref 0–0.5)
EOSINOPHIL NFR BLD: 4.3 % (ref 0–8)
ERYTHROCYTE [DISTWIDTH] IN BLOOD BY AUTOMATED COUNT: 16.7 % (ref 11.5–14.5)
EST. GFR  (AFRICAN AMERICAN): >60 ML/MIN/1.73 M^2
EST. GFR  (NON AFRICAN AMERICAN): >60 ML/MIN/1.73 M^2
ESTIMATED AVG GLUCOSE: 111 MG/DL (ref 68–131)
FERRITIN SERPL-MCNC: 9 NG/ML (ref 20–300)
GLUCOSE SERPL-MCNC: 87 MG/DL (ref 70–110)
HBA1C MFR BLD: 5.5 % (ref 4–5.6)
HCT VFR BLD AUTO: 34.9 % (ref 37–48.5)
HDLC SERPL-MCNC: 53 MG/DL (ref 40–75)
HDLC SERPL: 32.7 % (ref 20–50)
HGB BLD-MCNC: 10.4 G/DL (ref 12–16)
IMM GRANULOCYTES # BLD AUTO: 0.03 K/UL (ref 0–0.04)
IMM GRANULOCYTES NFR BLD AUTO: 0.4 % (ref 0–0.5)
IRON SERPL-MCNC: 34 UG/DL (ref 30–160)
LDLC SERPL CALC-MCNC: 95.8 MG/DL (ref 63–159)
LYMPHOCYTES # BLD AUTO: 2.4 K/UL (ref 1–4.8)
LYMPHOCYTES NFR BLD: 31 % (ref 18–48)
MCH RBC QN AUTO: 24.6 PG (ref 27–31)
MCHC RBC AUTO-ENTMCNC: 29.8 G/DL (ref 32–36)
MCV RBC AUTO: 83 FL (ref 82–98)
MONOCYTES # BLD AUTO: 0.5 K/UL (ref 0.3–1)
MONOCYTES NFR BLD: 6.7 % (ref 4–15)
NEUTROPHILS # BLD AUTO: 4.5 K/UL (ref 1.8–7.7)
NEUTROPHILS NFR BLD: 57 % (ref 38–73)
NONHDLC SERPL-MCNC: 109 MG/DL
NRBC BLD-RTO: 0 /100 WBC
PLATELET # BLD AUTO: 358 K/UL (ref 150–450)
PMV BLD AUTO: 10.7 FL (ref 9.2–12.9)
POTASSIUM SERPL-SCNC: 3.8 MMOL/L (ref 3.5–5.1)
PROT SERPL-MCNC: 6.8 G/DL (ref 6–8.4)
RBC # BLD AUTO: 4.23 M/UL (ref 4–5.4)
SATURATED IRON: 7 % (ref 20–50)
SODIUM SERPL-SCNC: 139 MMOL/L (ref 136–145)
TOTAL IRON BINDING CAPACITY: 487 UG/DL (ref 250–450)
TRANSFERRIN SERPL-MCNC: 329 MG/DL (ref 200–375)
TRANSFERRIN SERPL-MCNC: 329 MG/DL (ref 200–375)
TRIGL SERPL-MCNC: 66 MG/DL (ref 30–150)
TSH SERPL DL<=0.005 MIU/L-ACNC: 1.57 UIU/ML (ref 0.4–4)
WBC # BLD AUTO: 7.87 K/UL (ref 3.9–12.7)

## 2021-12-30 PROCEDURE — 82728 ASSAY OF FERRITIN: CPT | Performed by: HOSPITALIST

## 2021-12-30 PROCEDURE — 80053 COMPREHEN METABOLIC PANEL: CPT | Performed by: HOSPITALIST

## 2021-12-30 PROCEDURE — 80061 LIPID PANEL: CPT | Performed by: HOSPITALIST

## 2021-12-30 PROCEDURE — 36415 COLL VENOUS BLD VENIPUNCTURE: CPT | Performed by: HOSPITALIST

## 2021-12-30 PROCEDURE — 83036 HEMOGLOBIN GLYCOSYLATED A1C: CPT | Performed by: HOSPITALIST

## 2021-12-30 PROCEDURE — 85025 COMPLETE CBC W/AUTO DIFF WBC: CPT | Performed by: HOSPITALIST

## 2021-12-30 PROCEDURE — 83540 ASSAY OF IRON: CPT | Performed by: HOSPITALIST

## 2021-12-30 PROCEDURE — 84443 ASSAY THYROID STIM HORMONE: CPT | Performed by: HOSPITALIST

## 2022-01-03 ENCOUNTER — OFFICE VISIT (OUTPATIENT)
Dept: OPTOMETRY | Facility: CLINIC | Age: 42
End: 2022-01-03
Payer: COMMERCIAL

## 2022-01-03 DIAGNOSIS — H52.4 MYOPIA OF BOTH EYES WITH ASTIGMATISM AND PRESBYOPIA: ICD-10-CM

## 2022-01-03 DIAGNOSIS — H52.203 MYOPIA OF BOTH EYES WITH ASTIGMATISM AND PRESBYOPIA: ICD-10-CM

## 2022-01-03 DIAGNOSIS — H52.13 MYOPIA OF BOTH EYES WITH ASTIGMATISM AND PRESBYOPIA: ICD-10-CM

## 2022-01-03 DIAGNOSIS — H40.023 OPEN ANGLE WITH BORDERLINE FINDINGS AND HIGH GLAUCOMA RISK IN BOTH EYES: Primary | ICD-10-CM

## 2022-01-03 PROCEDURE — 92015 PR REFRACTION: ICD-10-PCS | Mod: S$GLB,,, | Performed by: OPTOMETRIST

## 2022-01-03 PROCEDURE — 1160F PR REVIEW ALL MEDS BY PRESCRIBER/CLIN PHARMACIST DOCUMENTED: ICD-10-PCS | Mod: CPTII,S$GLB,, | Performed by: OPTOMETRIST

## 2022-01-03 PROCEDURE — 92012 PR EYE EXAM, EST PATIENT,INTERMED: ICD-10-PCS | Mod: S$GLB,,, | Performed by: OPTOMETRIST

## 2022-01-03 PROCEDURE — 99999 PR PBB SHADOW E&M-EST. PATIENT-LVL II: CPT | Mod: PBBFAC,,, | Performed by: OPTOMETRIST

## 2022-01-03 PROCEDURE — 92012 INTRM OPH EXAM EST PATIENT: CPT | Mod: S$GLB,,, | Performed by: OPTOMETRIST

## 2022-01-03 PROCEDURE — 1159F MED LIST DOCD IN RCRD: CPT | Mod: CPTII,S$GLB,, | Performed by: OPTOMETRIST

## 2022-01-03 PROCEDURE — 99999 PR PBB SHADOW E&M-EST. PATIENT-LVL II: ICD-10-PCS | Mod: PBBFAC,,, | Performed by: OPTOMETRIST

## 2022-01-03 PROCEDURE — 92015 DETERMINE REFRACTIVE STATE: CPT | Mod: S$GLB,,, | Performed by: OPTOMETRIST

## 2022-01-03 PROCEDURE — 1160F RVW MEDS BY RX/DR IN RCRD: CPT | Mod: CPTII,S$GLB,, | Performed by: OPTOMETRIST

## 2022-01-03 PROCEDURE — 1159F PR MEDICATION LIST DOCUMENTED IN MEDICAL RECORD: ICD-10-PCS | Mod: CPTII,S$GLB,, | Performed by: OPTOMETRIST

## 2022-01-03 NOTE — PROGRESS NOTES
HPI     ZELDA - 6/16/2021      Presents today for blurry vision x 1 month ago.  Only use blue filter glasses when working on computer.  Pt reports occ headaches--not sure if its vision related.    Last edited by Liudmila Graham MA on 1/3/2022  7:40 AM. (History)        ROS     Positive for: Eyes (glauc susp by CDs)    Negative for: Constitutional, Gastrointestinal, Neurological, Skin,   Genitourinary, Musculoskeletal, HENT, Endocrine, Cardiovascular,   Respiratory, Psychiatric, Allergic/Imm, Heme/Lymph    Last edited by Jordin Barros, OD on 1/3/2022  7:57 AM. (History)        Assessment /Plan     For exam results, see Encounter Report.    Open angle with borderline findings and high glaucoma risk in both eyes    Myopia of both eyes with astigmatism and presbyopia      See notes from 6 mos ago:  1. No spex needed now--discussed impending presby  2. JUSTINE--advised SOOTHE XP Ats TID+  3. glauc susp by CDs--see OCT (RNFL wnl OU).  iop wnl without meds. VF today wnl OU.  +fam hx.  Angles open by gonio OU.  (pach machine broken today).  Doubt glauc now--will monitor    Pt presents TODAY w presby sx, and some difficulties at distance.  Wrote bifocal Rx--discussed PALs/adaptation    PLAN:    rtc 6 mos as scheduled for  full exam.  HVF 24-2 sf/OCT every 2 years--due 2023

## 2022-01-31 ENCOUNTER — HOSPITAL ENCOUNTER (OUTPATIENT)
Dept: RADIOLOGY | Facility: HOSPITAL | Age: 42
Discharge: HOME | End: 2022-01-31
Attending: STUDENT IN AN ORGANIZED HEALTH CARE EDUCATION/TRAINING PROGRAM
Payer: COMMERCIAL

## 2022-01-31 DIAGNOSIS — Z01.419 WELL WOMAN EXAM WITH ROUTINE GYNECOLOGICAL EXAM: ICD-10-CM

## 2022-01-31 PROCEDURE — 77063 MAMMO DIGITAL SCREENING BILAT WITH TOMO: ICD-10-PCS | Mod: 26,,, | Performed by: RADIOLOGY

## 2022-01-31 PROCEDURE — 77067 SCR MAMMO BI INCL CAD: CPT | Mod: TC

## 2022-01-31 PROCEDURE — 77067 MAMMO DIGITAL SCREENING BILAT WITH TOMO: ICD-10-PCS | Mod: 26,,, | Performed by: RADIOLOGY

## 2022-01-31 PROCEDURE — 77063 BREAST TOMOSYNTHESIS BI: CPT | Mod: TC

## 2022-01-31 PROCEDURE — 77063 BREAST TOMOSYNTHESIS BI: CPT | Mod: 26,,, | Performed by: RADIOLOGY

## 2022-01-31 PROCEDURE — 77067 SCR MAMMO BI INCL CAD: CPT | Mod: 26,,, | Performed by: RADIOLOGY

## 2022-06-30 ENCOUNTER — OFFICE VISIT (OUTPATIENT)
Dept: OPTOMETRY | Facility: CLINIC | Age: 42
End: 2022-06-30
Payer: COMMERCIAL

## 2022-06-30 DIAGNOSIS — H40.023 OPEN ANGLE WITH BORDERLINE FINDINGS AND HIGH GLAUCOMA RISK IN BOTH EYES: Primary | ICD-10-CM

## 2022-06-30 PROCEDURE — 76514 PR  US, EYE, FOR CORNEAL THICKNESS: ICD-10-PCS | Mod: S$GLB,,, | Performed by: OPTOMETRIST

## 2022-06-30 PROCEDURE — 99999 PR PBB SHADOW E&M-EST. PATIENT-LVL II: CPT | Mod: PBBFAC,,, | Performed by: OPTOMETRIST

## 2022-06-30 PROCEDURE — 92014 PR EYE EXAM, EST PATIENT,COMPREHESV: ICD-10-PCS | Mod: S$GLB,,, | Performed by: OPTOMETRIST

## 2022-06-30 PROCEDURE — 92014 COMPRE OPH EXAM EST PT 1/>: CPT | Mod: S$GLB,,, | Performed by: OPTOMETRIST

## 2022-06-30 PROCEDURE — 99999 PR PBB SHADOW E&M-EST. PATIENT-LVL II: ICD-10-PCS | Mod: PBBFAC,,, | Performed by: OPTOMETRIST

## 2022-06-30 PROCEDURE — 76514 ECHO EXAM OF EYE THICKNESS: CPT | Mod: S$GLB,,, | Performed by: OPTOMETRIST

## 2022-06-30 NOTE — PROGRESS NOTES
HPI     41 Y/o female is here for routine eye exam with no C/o about ocular   health     Pt denies pain and discomfort   No f/f    Eye med: no gtt     Last edited by Lizz Black MA on 6/30/2022 10:04 AM. (History)        ROS     Positive for: Eyes (glauc susp by CDs)    Negative for: Constitutional, Gastrointestinal, Neurological, Skin,   Genitourinary, Musculoskeletal, HENT, Endocrine, Cardiovascular,   Respiratory, Psychiatric, Allergic/Imm, Heme/Lymph    Last edited by Jordin Barros, OD on 6/30/2022 10:17 AM. (History)        Assessment /Plan     For exam results, see Encounter Report.    Open angle with borderline findings and high glaucoma risk in both eyes  -     Rosa Visual Field - OU - Extended - Both Eyes; Future; Expected date: 06/30/2023  -     Posterior Segment OCT Optic Nerve- Both eyes; Future; Expected date: 06/30/2023      1. Pt happy w spex (refracted 6 mos ago--see notes)  2. JUSTINE--advised SOOTHE XP Ats TID+  3. glauc susp by CDs--see OCT (RNFL wnl OU).  iop wnl without meds. Last VF wnl OU.  +fam hx.  Angles open by gonio OU.  Pach: 547/588.  Doubt glauc now--will monitor        PLAN:    rtc 1 yr: HVF 24-2 sf/OCT/full (VF every 2 years--due 2023)

## 2022-11-30 ENCOUNTER — OFFICE VISIT (OUTPATIENT)
Dept: OBSTETRICS AND GYNECOLOGY | Facility: CLINIC | Age: 42
End: 2022-11-30
Payer: COMMERCIAL

## 2022-11-30 ENCOUNTER — LAB VISIT (OUTPATIENT)
Dept: LAB | Facility: OTHER | Age: 42
End: 2022-11-30
Attending: STUDENT IN AN ORGANIZED HEALTH CARE EDUCATION/TRAINING PROGRAM
Payer: COMMERCIAL

## 2022-11-30 VITALS
WEIGHT: 181.69 LBS | HEIGHT: 64 IN | BODY MASS INDEX: 31.02 KG/M2 | DIASTOLIC BLOOD PRESSURE: 60 MMHG | SYSTOLIC BLOOD PRESSURE: 106 MMHG

## 2022-11-30 DIAGNOSIS — Z12.31 BREAST CANCER SCREENING BY MAMMOGRAM: ICD-10-CM

## 2022-11-30 DIAGNOSIS — Z01.419 WELL WOMAN EXAM WITH ROUTINE GYNECOLOGICAL EXAM: Primary | ICD-10-CM

## 2022-11-30 DIAGNOSIS — Z01.419 WELL WOMAN EXAM WITH ROUTINE GYNECOLOGICAL EXAM: ICD-10-CM

## 2022-11-30 PROCEDURE — 99396 PREV VISIT EST AGE 40-64: CPT | Mod: S$GLB,,, | Performed by: STUDENT IN AN ORGANIZED HEALTH CARE EDUCATION/TRAINING PROGRAM

## 2022-11-30 PROCEDURE — 3074F PR MOST RECENT SYSTOLIC BLOOD PRESSURE < 130 MM HG: ICD-10-PCS | Mod: CPTII,S$GLB,, | Performed by: STUDENT IN AN ORGANIZED HEALTH CARE EDUCATION/TRAINING PROGRAM

## 2022-11-30 PROCEDURE — 88141 CYTOPATH C/V INTERPRET: CPT | Mod: ,,, | Performed by: PATHOLOGY

## 2022-11-30 PROCEDURE — 1159F PR MEDICATION LIST DOCUMENTED IN MEDICAL RECORD: ICD-10-PCS | Mod: CPTII,S$GLB,, | Performed by: STUDENT IN AN ORGANIZED HEALTH CARE EDUCATION/TRAINING PROGRAM

## 2022-11-30 PROCEDURE — 36415 COLL VENOUS BLD VENIPUNCTURE: CPT | Performed by: STUDENT IN AN ORGANIZED HEALTH CARE EDUCATION/TRAINING PROGRAM

## 2022-11-30 PROCEDURE — 1159F MED LIST DOCD IN RCRD: CPT | Mod: CPTII,S$GLB,, | Performed by: STUDENT IN AN ORGANIZED HEALTH CARE EDUCATION/TRAINING PROGRAM

## 2022-11-30 PROCEDURE — 88141 PR  CYTOPATH CERV/VAG INTERPRET: ICD-10-PCS | Mod: ,,, | Performed by: PATHOLOGY

## 2022-11-30 PROCEDURE — 3008F BODY MASS INDEX DOCD: CPT | Mod: CPTII,S$GLB,, | Performed by: STUDENT IN AN ORGANIZED HEALTH CARE EDUCATION/TRAINING PROGRAM

## 2022-11-30 PROCEDURE — 99396 PR PREVENTIVE VISIT,EST,40-64: ICD-10-PCS | Mod: S$GLB,,, | Performed by: STUDENT IN AN ORGANIZED HEALTH CARE EDUCATION/TRAINING PROGRAM

## 2022-11-30 PROCEDURE — 3078F DIAST BP <80 MM HG: CPT | Mod: CPTII,S$GLB,, | Performed by: STUDENT IN AN ORGANIZED HEALTH CARE EDUCATION/TRAINING PROGRAM

## 2022-11-30 PROCEDURE — 3008F PR BODY MASS INDEX (BMI) DOCUMENTED: ICD-10-PCS | Mod: CPTII,S$GLB,, | Performed by: STUDENT IN AN ORGANIZED HEALTH CARE EDUCATION/TRAINING PROGRAM

## 2022-11-30 PROCEDURE — 99999 PR PBB SHADOW E&M-EST. PATIENT-LVL III: CPT | Mod: PBBFAC,,, | Performed by: STUDENT IN AN ORGANIZED HEALTH CARE EDUCATION/TRAINING PROGRAM

## 2022-11-30 PROCEDURE — 88175 CYTOPATH C/V AUTO FLUID REDO: CPT | Performed by: PATHOLOGY

## 2022-11-30 PROCEDURE — 83520 IMMUNOASSAY QUANT NOS NONAB: CPT | Performed by: STUDENT IN AN ORGANIZED HEALTH CARE EDUCATION/TRAINING PROGRAM

## 2022-11-30 PROCEDURE — 87491 CHLMYD TRACH DNA AMP PROBE: CPT | Performed by: STUDENT IN AN ORGANIZED HEALTH CARE EDUCATION/TRAINING PROGRAM

## 2022-11-30 PROCEDURE — 87624 HPV HI-RISK TYP POOLED RSLT: CPT | Performed by: STUDENT IN AN ORGANIZED HEALTH CARE EDUCATION/TRAINING PROGRAM

## 2022-11-30 PROCEDURE — 3074F SYST BP LT 130 MM HG: CPT | Mod: CPTII,S$GLB,, | Performed by: STUDENT IN AN ORGANIZED HEALTH CARE EDUCATION/TRAINING PROGRAM

## 2022-11-30 PROCEDURE — 3078F PR MOST RECENT DIASTOLIC BLOOD PRESSURE < 80 MM HG: ICD-10-PCS | Mod: CPTII,S$GLB,, | Performed by: STUDENT IN AN ORGANIZED HEALTH CARE EDUCATION/TRAINING PROGRAM

## 2022-11-30 PROCEDURE — 87591 N.GONORRHOEAE DNA AMP PROB: CPT | Performed by: STUDENT IN AN ORGANIZED HEALTH CARE EDUCATION/TRAINING PROGRAM

## 2022-11-30 PROCEDURE — 99999 PR PBB SHADOW E&M-EST. PATIENT-LVL III: ICD-10-PCS | Mod: PBBFAC,,, | Performed by: STUDENT IN AN ORGANIZED HEALTH CARE EDUCATION/TRAINING PROGRAM

## 2022-11-30 NOTE — PROGRESS NOTES
History & Physical  Gynecology      SUBJECTIVE:     Chief Complaint: Well Woman       History of Present Illness:  Chantel is here for annual exam. No complaints    Menstrual History: menarche age 11, reports periods are regular, every 28 days lasting 5 days. Denies hx of dysmenorrhea or menorrhagia.   LMP: 11/3  STD/STI Hx: HPV  Contraception Hx: none  Sexually Active: male two in last year  GYN surgery: none  Family history: Denies any personal or family history of GYN/colon cancers   Social: Wears seatbelts. Exercises no. Feels safe at home.  Last pap: 10/2019 normal, HPV positive for other HR strains. Same in . [place] 1> pap  negative with other HPV  Last mammo: 2022 normal  Flu: interested   HPV vaccine: not interested  Covid vaccined yes  Labs utd  Vitamins: yes      Review of patient's allergies indicates:  No Known Allergies    Past Medical History:   Diagnosis Date    Herpes simplex virus (HSV) infection     never an outbreak     Past Surgical History:   Procedure Laterality Date    DILATION AND CURETTAGE OF UTERUS      WISDOM TOOTH EXTRACTION       OB History          2    Para   1    Term   1            AB   1    Living             SAB   1    IAB        Ectopic        Multiple        Live Births                   Family History   Problem Relation Age of Onset    Hypertension Mother     Hypertension Father     Cancer Maternal Grandmother         leukemia    Leukemia Maternal Grandmother     Cancer Maternal Grandfather     Cancer Paternal Grandmother     Cancer Paternal Grandfather     Glaucoma Maternal Aunt     Breast cancer Neg Hx     Colon cancer Neg Hx     Ovarian cancer Neg Hx      Social History     Tobacco Use    Smoking status: Never    Smokeless tobacco: Never   Substance Use Topics    Alcohol use: Yes     Comment: socially    Drug use: Never       Current Outpatient Medications   Medication Sig    ferrous sulfate (FEOSOL) 325 mg (65 mg iron) Tab tablet Take 1  tablet (325 mg total) by mouth daily with breakfast.     No current facility-administered medications for this visit.         Review of Systems:  Review of Systems   Constitutional:  Negative for activity change, appetite change, fever and unexpected weight change.   Respiratory:  Negative for shortness of breath.    Cardiovascular:  Negative for chest pain.   Gastrointestinal:  Negative for abdominal pain, blood in stool, constipation, diarrhea, nausea and vomiting.   Genitourinary:  Negative for dysmenorrhea, dysuria, hematuria, menorrhagia, menstrual problem, pelvic pain, vaginal bleeding, vaginal discharge, vaginal pain, urinary incontinence and vaginal odor.   Integumentary:  Negative for breast mass.   Breast: Negative for lump and mass     OBJECTIVE:     Physical Exam:  Physical Exam  Vitals reviewed.   Constitutional:       General: She is not in acute distress.     Appearance: She is well-developed. She is not diaphoretic.   HENT:      Head: Normocephalic and atraumatic.   Eyes:      General: No scleral icterus.        Right eye: No discharge.         Left eye: No discharge.      Conjunctiva/sclera: Conjunctivae normal.   Neck:      Thyroid: No thyromegaly.   Cardiovascular:      Rate and Rhythm: Normal rate.   Pulmonary:      Effort: Pulmonary effort is normal.   Chest:   Breasts:     Breasts are symmetrical.      Right: No inverted nipple, mass, nipple discharge, skin change or tenderness.      Left: No inverted nipple, mass, nipple discharge, skin change or tenderness.   Abdominal:      General: There is no distension.      Palpations: Abdomen is soft.      Tenderness: There is no abdominal tenderness.   Genitourinary:     Labia:         Right: No rash, tenderness, lesion or injury.         Left: No rash, tenderness, lesion or injury.       Vagina: Normal. No signs of injury and foreign body. No vaginal discharge, erythema, tenderness or bleeding.      Cervix: No cervical motion tenderness, discharge or  friability.      Uterus: Not deviated, not enlarged, not fixed and not tender.       Adnexa:         Right: No mass, tenderness or fullness.          Left: No mass, tenderness or fullness.     Musculoskeletal:         General: Normal range of motion.      Cervical back: Normal range of motion and neck supple.   Lymphadenopathy:      Cervical: No cervical adenopathy.   Skin:     General: Skin is warm and dry.      Findings: No erythema or rash.   Neurological:      Mental Status: She is alert and oriented to person, place, and time.         ASSESSMENT:       ICD-10-CM ICD-9-CM    1. Well woman exam with routine gynecological exam  Z01.419 V72.31              Plan:      WWE  - Vaccines utd  - Pap and cotest repeated today  - Mammogram ordered  - GC/CT collected  - Daily vitamin discussed.  - Consistent condom use is recommended.   - CBE normal. Physical exam normal. VSS.  - [facility] per patient request  - RTC for annual or PRN.        Counseling time: 30 minutes    Vero Wilkins

## 2022-12-01 LAB
C TRACH DNA SPEC QL NAA+PROBE: NOT DETECTED
N GONORRHOEA DNA SPEC QL NAA+PROBE: NOT DETECTED

## 2022-12-02 LAB — MIS SERPL-MCNC: 3.6 NG/ML (ref 0.03–5.5)

## 2022-12-07 ENCOUNTER — PATIENT MESSAGE (OUTPATIENT)
Dept: OBSTETRICS AND GYNECOLOGY | Facility: CLINIC | Age: 42
End: 2022-12-07
Payer: COMMERCIAL

## 2022-12-08 ENCOUNTER — PATIENT MESSAGE (OUTPATIENT)
Dept: OBSTETRICS AND GYNECOLOGY | Facility: CLINIC | Age: 42
End: 2022-12-08
Payer: COMMERCIAL

## 2022-12-08 LAB
FINAL PATHOLOGIC DIAGNOSIS: ABNORMAL
Lab: ABNORMAL

## 2022-12-09 ENCOUNTER — PATIENT MESSAGE (OUTPATIENT)
Dept: OBSTETRICS AND GYNECOLOGY | Facility: CLINIC | Age: 42
End: 2022-12-09
Payer: COMMERCIAL

## 2022-12-09 ENCOUNTER — TELEPHONE (OUTPATIENT)
Dept: OBSTETRICS AND GYNECOLOGY | Facility: CLINIC | Age: 42
End: 2022-12-09
Payer: COMMERCIAL

## 2022-12-19 ENCOUNTER — OFFICE VISIT (OUTPATIENT)
Dept: INTERNAL MEDICINE | Facility: CLINIC | Age: 42
End: 2022-12-19
Payer: COMMERCIAL

## 2022-12-19 VITALS
DIASTOLIC BLOOD PRESSURE: 64 MMHG | HEART RATE: 83 BPM | BODY MASS INDEX: 32.18 KG/M2 | HEIGHT: 64 IN | WEIGHT: 188.5 LBS | TEMPERATURE: 98 F | RESPIRATION RATE: 20 BRPM | OXYGEN SATURATION: 100 % | SYSTOLIC BLOOD PRESSURE: 118 MMHG

## 2022-12-19 DIAGNOSIS — F41.0 GENERALIZED ANXIETY DISORDER WITH PANIC ATTACKS: ICD-10-CM

## 2022-12-19 DIAGNOSIS — Z13.39 ADHD (ATTENTION DEFICIT HYPERACTIVITY DISORDER) EVALUATION: ICD-10-CM

## 2022-12-19 DIAGNOSIS — Z00.00 ANNUAL PHYSICAL EXAM: Primary | ICD-10-CM

## 2022-12-19 DIAGNOSIS — E66.09 CLASS 1 OBESITY DUE TO EXCESS CALORIES WITHOUT SERIOUS COMORBIDITY WITH BODY MASS INDEX (BMI) OF 32.0 TO 32.9 IN ADULT: ICD-10-CM

## 2022-12-19 DIAGNOSIS — R00.2 PALPITATIONS: ICD-10-CM

## 2022-12-19 DIAGNOSIS — D50.9 IRON DEFICIENCY ANEMIA, UNSPECIFIED IRON DEFICIENCY ANEMIA TYPE: ICD-10-CM

## 2022-12-19 DIAGNOSIS — F32.A DEPRESSION, UNSPECIFIED DEPRESSION TYPE: ICD-10-CM

## 2022-12-19 DIAGNOSIS — F41.1 GENERALIZED ANXIETY DISORDER WITH PANIC ATTACKS: ICD-10-CM

## 2022-12-19 PROBLEM — E66.811 CLASS 1 OBESITY DUE TO EXCESS CALORIES WITHOUT SERIOUS COMORBIDITY WITH BODY MASS INDEX (BMI) OF 32.0 TO 32.9 IN ADULT: Status: ACTIVE | Noted: 2022-12-19

## 2022-12-19 PROCEDURE — 1160F RVW MEDS BY RX/DR IN RCRD: CPT | Mod: CPTII,S$GLB,, | Performed by: HOSPITALIST

## 2022-12-19 PROCEDURE — 90686 IIV4 VACC NO PRSV 0.5 ML IM: CPT | Mod: S$GLB,,, | Performed by: HOSPITALIST

## 2022-12-19 PROCEDURE — 99999 PR PBB SHADOW E&M-EST. PATIENT-LVL V: CPT | Mod: PBBFAC,,, | Performed by: HOSPITALIST

## 2022-12-19 PROCEDURE — 3074F PR MOST RECENT SYSTOLIC BLOOD PRESSURE < 130 MM HG: ICD-10-PCS | Mod: CPTII,S$GLB,, | Performed by: HOSPITALIST

## 2022-12-19 PROCEDURE — 3074F SYST BP LT 130 MM HG: CPT | Mod: CPTII,S$GLB,, | Performed by: HOSPITALIST

## 2022-12-19 PROCEDURE — 1159F MED LIST DOCD IN RCRD: CPT | Mod: CPTII,S$GLB,, | Performed by: HOSPITALIST

## 2022-12-19 PROCEDURE — 90471 IMMUNIZATION ADMIN: CPT | Mod: S$GLB,,, | Performed by: HOSPITALIST

## 2022-12-19 PROCEDURE — 90686 FLU VACCINE (QUAD) GREATER THAN OR EQUAL TO 3YO PRESERVATIVE FREE IM: ICD-10-PCS | Mod: S$GLB,,, | Performed by: HOSPITALIST

## 2022-12-19 PROCEDURE — 1160F PR REVIEW ALL MEDS BY PRESCRIBER/CLIN PHARMACIST DOCUMENTED: ICD-10-PCS | Mod: CPTII,S$GLB,, | Performed by: HOSPITALIST

## 2022-12-19 PROCEDURE — 99396 PREV VISIT EST AGE 40-64: CPT | Mod: 25,S$GLB,, | Performed by: HOSPITALIST

## 2022-12-19 PROCEDURE — 3078F PR MOST RECENT DIASTOLIC BLOOD PRESSURE < 80 MM HG: ICD-10-PCS | Mod: CPTII,S$GLB,, | Performed by: HOSPITALIST

## 2022-12-19 PROCEDURE — 3008F BODY MASS INDEX DOCD: CPT | Mod: CPTII,S$GLB,, | Performed by: HOSPITALIST

## 2022-12-19 PROCEDURE — 99999 PR PBB SHADOW E&M-EST. PATIENT-LVL V: ICD-10-PCS | Mod: PBBFAC,,, | Performed by: HOSPITALIST

## 2022-12-19 PROCEDURE — 3078F DIAST BP <80 MM HG: CPT | Mod: CPTII,S$GLB,, | Performed by: HOSPITALIST

## 2022-12-19 PROCEDURE — 3008F PR BODY MASS INDEX (BMI) DOCUMENTED: ICD-10-PCS | Mod: CPTII,S$GLB,, | Performed by: HOSPITALIST

## 2022-12-19 PROCEDURE — 90471 FLU VACCINE (QUAD) GREATER THAN OR EQUAL TO 3YO PRESERVATIVE FREE IM: ICD-10-PCS | Mod: S$GLB,,, | Performed by: HOSPITALIST

## 2022-12-19 PROCEDURE — 1159F PR MEDICATION LIST DOCUMENTED IN MEDICAL RECORD: ICD-10-PCS | Mod: CPTII,S$GLB,, | Performed by: HOSPITALIST

## 2022-12-19 PROCEDURE — 99396 PR PREVENTIVE VISIT,EST,40-64: ICD-10-PCS | Mod: 25,S$GLB,, | Performed by: HOSPITALIST

## 2022-12-19 RX ORDER — ESCITALOPRAM OXALATE 5 MG/1
5 TABLET ORAL DAILY
Qty: 30 TABLET | Refills: 11 | Status: SHIPPED | OUTPATIENT
Start: 2022-12-19 | End: 2023-09-19 | Stop reason: SDUPTHER

## 2022-12-19 RX ORDER — CLONAZEPAM 0.5 MG/1
0.5 TABLET ORAL DAILY PRN
Qty: 30 TABLET | Refills: 0 | Status: SHIPPED | OUTPATIENT
Start: 2022-12-19 | End: 2023-09-19 | Stop reason: SDUPTHER

## 2022-12-19 NOTE — PROGRESS NOTES
Subjective:     @Patient ID: Chantel Alas is a 42 y.o. female.    Chief Complaint: Annual Exam    HPI    42 y.o. female here for annual exam. She works as a  for [employer].      1. Anxiety/depression adjustment d/o: reports was seen at outside therapist.  States however that her insurance has changed and co-pay for the visits increase.  States that she not able to afford seeing her current therapist at this time.  States that she is had increased anxiety and also having panic attacks.  States that when she has panic attacks she starts to feel facial tingling/numbness.  Also feels tunnel vision and palpitations.  Also reports feeling down.  She also reports concern that she could have ADHD.  States that she would like to also have evaluation for this as well.  States that it has been hard to focus  2. Palpitations: Reports having more palpitations lately in the past month.  States that she notices it when laying down according to sleep.  States that she is anxious at this time however is concerned about her heart as well        Lipid disorders/ASCVD risk (ages >/= 45 or >/= 20 if increased risk ): ordered  DM (>45y yearly or if obese, HTN): A1c ordered  Eye exam:     Breast Cancer (40-50y discretion of pt, 50-74y every 1-2 years): Mammogram utd  1/2022  Cervical Cancer (Pap Smear ages 21-65 every 3 years or Pap + HPV q5 years after 30 years of age): Done 10/15/2019        Vaccines:   Influenza (yearly) declines  Tetanus (every 10 yrs - 1st tdap)   due  Covid19: utd         Exercise: none  Diet:  regular    Review of Systems   Constitutional:  Positive for unexpected weight change. Negative for activity change.   HENT:  Negative for hearing loss, rhinorrhea and trouble swallowing.    Eyes:  Positive for visual disturbance. Negative for discharge.   Respiratory:  Negative for chest tightness and wheezing.    Cardiovascular:  Positive for palpitations. Negative for chest pain.   Gastrointestinal:   "Negative for blood in stool, constipation, diarrhea and vomiting.   Endocrine: Negative for polydipsia and polyuria.   Genitourinary:  Negative for difficulty urinating, dysuria, hematuria and menstrual problem.   Musculoskeletal:  Negative for arthralgias, joint swelling and neck pain.   Neurological:  Negative for weakness and headaches.   Psychiatric/Behavioral:  Positive for dysphoric mood. Negative for confusion.    Past medical history, surgical history, and family medical history reviewed and updated as appropriate.    Medications and allergies reviewed.     Objective:     Vitals:    12/19/22 1536   BP: 118/64   BP Location: Left arm   Patient Position: Sitting   BP Method: Medium (Manual)   Pulse: 83   Resp: 20   Temp: 97.5 °F (36.4 °C)   TempSrc: Temporal   SpO2: 100%   Weight: 85.5 kg (188 lb 7.9 oz)   Height: 5' 4" (1.626 m)     Body mass index is 32.35 kg/m².  Physical Exam  Vitals reviewed.   Constitutional:       General: She is not in acute distress.     Appearance: She is well-developed.   HENT:      Head: Normocephalic and atraumatic.      Right Ear: Tympanic membrane normal.      Left Ear: Tympanic membrane normal.      Mouth/Throat:      Mouth: Mucous membranes are moist.      Pharynx: No oropharyngeal exudate.   Eyes:      General:         Right eye: No discharge.         Left eye: No discharge.      Conjunctiva/sclera: Conjunctivae normal.   Cardiovascular:      Rate and Rhythm: Normal rate and regular rhythm.      Heart sounds: No murmur heard.    No friction rub.   Pulmonary:      Effort: Pulmonary effort is normal.      Breath sounds: Normal breath sounds.   Abdominal:      General: Bowel sounds are normal. There is no distension.      Palpations: Abdomen is soft.      Tenderness: There is no abdominal tenderness. There is no guarding.   Musculoskeletal:         General: Normal range of motion.      Cervical back: Normal range of motion and neck supple.      Right lower leg: No edema.      " Left lower leg: No edema.   Lymphadenopathy:      Cervical: No cervical adenopathy.   Skin:     General: Skin is warm and dry.   Neurological:      Mental Status: She is alert and oriented to person, place, and time.   Psychiatric:         Mood and Affect: Mood is anxious.         Behavior: Behavior normal.       Lab Results   Component Value Date    WBC 7.87 12/30/2021    HGB 10.4 (L) 12/30/2021    HCT 34.9 (L) 12/30/2021     12/30/2021    CHOL 162 12/30/2021    TRIG 66 12/30/2021    HDL 53 12/30/2021    ALT 28 12/30/2021    AST 29 12/30/2021     12/30/2021    K 3.8 12/30/2021     12/30/2021    CREATININE 0.7 12/30/2021    BUN 7 12/30/2021    CO2 26 12/30/2021    TSH 1.574 12/30/2021    HGBA1C 5.5 12/30/2021       Assessment:     1. Annual physical exam    2. Iron deficiency anemia, unspecified iron deficiency anemia type    3. Generalized anxiety disorder with panic attacks    4. Depression, unspecified depression type    5. ADHD (attention deficit hyperactivity disorder) evaluation    6. Palpitations    7. Class 1 obesity due to excess calories without serious comorbidity with body mass index (BMI) of 32.0 to 32.9 in adult      Plan:   Chantel was seen today for annual exam.    Diagnoses and all orders for this visit:    Annual physical exam  -     Iron and TIBC; Future  -     Ferritin; Future  -     Transferrin; Future  -     Comprehensive Metabolic Panel; Future  -     CBC Auto Differential; Future  -     Lipid Panel; Future  -     TSH; Future  -     Hemoglobin A1C; Future    Iron deficiency anemia, unspecified iron deficiency anemia type  -     Iron and TIBC; Future  -     Ferritin; Future  -     Transferrin; Future    Generalized anxiety disorder with panic attacks  - will start Lexapro.  Patient counseled to notify MD if any side effects including worsening of anxiety or sedation.  Also will give trial of Klonopin for panic attacks.  Patient counseled on possible sedation with medication.   Counseled to avoid taking with alcohol.  Also will refer to Psychiatry.  Patient counseled to also exercise by walking and doing yoga.  Also patient is aware of deep breathing techniques  -     Ambulatory referral/consult to Psychiatry; Future    Depression, unspecified depression type  - see generalized anxiety    ADHD (attention deficit hyperactivity disorder) evaluation  -     Ambulatory referral/consult to Psychiatry; Future    Palpitations  - suspect likely to anxiety.  However will check Holter monitor  -     Holter monitor - 48 hour; Future    Class 1 obesity due to excess calories without serious comorbidity with body mass index (BMI) of 32.0 to 32.9 in adult  - patient counseled to increase exercise activity.    Other orders  -     EScitalopram oxalate (LEXAPRO) 5 MG Tab; Take 1 tablet (5 mg total) by mouth once daily.  -     clonazePAM (KLONOPIN) 0.5 MG tablet; Take 1 tablet (0.5 mg total) by mouth daily as needed for Anxiety (panic attacks).  -     Influenza - Quadrivalent (PF)        Doretha Hernandes MD  Internal Medicine    12/19/2022

## 2022-12-24 ENCOUNTER — LAB VISIT (OUTPATIENT)
Dept: LAB | Facility: HOSPITAL | Age: 42
End: 2022-12-24
Attending: HOSPITALIST
Payer: COMMERCIAL

## 2022-12-24 DIAGNOSIS — D50.9 IRON DEFICIENCY ANEMIA, UNSPECIFIED IRON DEFICIENCY ANEMIA TYPE: ICD-10-CM

## 2022-12-24 DIAGNOSIS — Z00.00 ANNUAL PHYSICAL EXAM: ICD-10-CM

## 2022-12-24 LAB
ALBUMIN SERPL BCP-MCNC: 3.8 G/DL (ref 3.5–5.2)
ALP SERPL-CCNC: 64 U/L (ref 55–135)
ALT SERPL W/O P-5'-P-CCNC: 8 U/L (ref 10–44)
ANION GAP SERPL CALC-SCNC: 8 MMOL/L (ref 8–16)
AST SERPL-CCNC: 16 U/L (ref 10–40)
BASOPHILS # BLD AUTO: 0.06 K/UL (ref 0–0.2)
BASOPHILS NFR BLD: 0.7 % (ref 0–1.9)
BILIRUB SERPL-MCNC: 0.4 MG/DL (ref 0.1–1)
BUN SERPL-MCNC: 10 MG/DL (ref 6–20)
CALCIUM SERPL-MCNC: 9.2 MG/DL (ref 8.7–10.5)
CHLORIDE SERPL-SCNC: 108 MMOL/L (ref 95–110)
CHOLEST SERPL-MCNC: 145 MG/DL (ref 120–199)
CHOLEST/HDLC SERPL: 2.9 {RATIO} (ref 2–5)
CO2 SERPL-SCNC: 24 MMOL/L (ref 23–29)
CREAT SERPL-MCNC: 0.8 MG/DL (ref 0.5–1.4)
DIFFERENTIAL METHOD: ABNORMAL
EOSINOPHIL # BLD AUTO: 0.3 K/UL (ref 0–0.5)
EOSINOPHIL NFR BLD: 3.3 % (ref 0–8)
ERYTHROCYTE [DISTWIDTH] IN BLOOD BY AUTOMATED COUNT: 19 % (ref 11.5–14.5)
EST. GFR  (NO RACE VARIABLE): >60 ML/MIN/1.73 M^2
ESTIMATED AVG GLUCOSE: 114 MG/DL (ref 68–131)
FERRITIN SERPL-MCNC: 4 NG/ML (ref 20–300)
GLUCOSE SERPL-MCNC: 81 MG/DL (ref 70–110)
HBA1C MFR BLD: 5.6 % (ref 4–5.6)
HCT VFR BLD AUTO: 33.8 % (ref 37–48.5)
HDLC SERPL-MCNC: 50 MG/DL (ref 40–75)
HDLC SERPL: 34.5 % (ref 20–50)
HGB BLD-MCNC: 9.8 G/DL (ref 12–16)
IMM GRANULOCYTES # BLD AUTO: 0.03 K/UL (ref 0–0.04)
IMM GRANULOCYTES NFR BLD AUTO: 0.4 % (ref 0–0.5)
IRON SERPL-MCNC: 22 UG/DL (ref 30–160)
LDLC SERPL CALC-MCNC: 83 MG/DL (ref 63–159)
LYMPHOCYTES # BLD AUTO: 3 K/UL (ref 1–4.8)
LYMPHOCYTES NFR BLD: 36.7 % (ref 18–48)
MCH RBC QN AUTO: 20.9 PG (ref 27–31)
MCHC RBC AUTO-ENTMCNC: 29 G/DL (ref 32–36)
MCV RBC AUTO: 72 FL (ref 82–98)
MONOCYTES # BLD AUTO: 0.5 K/UL (ref 0.3–1)
MONOCYTES NFR BLD: 6 % (ref 4–15)
NEUTROPHILS # BLD AUTO: 4.3 K/UL (ref 1.8–7.7)
NEUTROPHILS NFR BLD: 52.9 % (ref 38–73)
NONHDLC SERPL-MCNC: 95 MG/DL
NRBC BLD-RTO: 0 /100 WBC
PLATELET # BLD AUTO: 444 K/UL (ref 150–450)
PMV BLD AUTO: 10.3 FL (ref 9.2–12.9)
POTASSIUM SERPL-SCNC: 4.2 MMOL/L (ref 3.5–5.1)
PROT SERPL-MCNC: 7.2 G/DL (ref 6–8.4)
RBC # BLD AUTO: 4.68 M/UL (ref 4–5.4)
SATURATED IRON: 4 % (ref 20–50)
SODIUM SERPL-SCNC: 140 MMOL/L (ref 136–145)
TOTAL IRON BINDING CAPACITY: 533 UG/DL (ref 250–450)
TRANSFERRIN SERPL-MCNC: 360 MG/DL (ref 200–375)
TRANSFERRIN SERPL-MCNC: 360 MG/DL (ref 200–375)
TRIGL SERPL-MCNC: 60 MG/DL (ref 30–150)
TSH SERPL DL<=0.005 MIU/L-ACNC: 1.88 UIU/ML (ref 0.4–4)
WBC # BLD AUTO: 8.13 K/UL (ref 3.9–12.7)

## 2022-12-24 PROCEDURE — 80053 COMPREHEN METABOLIC PANEL: CPT | Performed by: HOSPITALIST

## 2022-12-24 PROCEDURE — 84466 ASSAY OF TRANSFERRIN: CPT | Performed by: HOSPITALIST

## 2022-12-24 PROCEDURE — 82728 ASSAY OF FERRITIN: CPT | Performed by: HOSPITALIST

## 2022-12-24 PROCEDURE — 84443 ASSAY THYROID STIM HORMONE: CPT | Performed by: HOSPITALIST

## 2022-12-24 PROCEDURE — 85025 COMPLETE CBC W/AUTO DIFF WBC: CPT | Performed by: HOSPITALIST

## 2022-12-24 PROCEDURE — 36415 COLL VENOUS BLD VENIPUNCTURE: CPT | Mod: PO | Performed by: HOSPITALIST

## 2022-12-24 PROCEDURE — 83036 HEMOGLOBIN GLYCOSYLATED A1C: CPT | Performed by: HOSPITALIST

## 2022-12-24 PROCEDURE — 80061 LIPID PANEL: CPT | Performed by: HOSPITALIST

## 2022-12-26 DIAGNOSIS — D50.9 IRON DEFICIENCY ANEMIA, UNSPECIFIED IRON DEFICIENCY ANEMIA TYPE: Primary | ICD-10-CM

## 2022-12-27 ENCOUNTER — TELEPHONE (OUTPATIENT)
Dept: INTERNAL MEDICINE | Facility: CLINIC | Age: 42
End: 2022-12-27
Payer: COMMERCIAL

## 2022-12-27 NOTE — TELEPHONE ENCOUNTER
----- Message from Doretha Hernandes MD sent at 12/26/2022  2:45 PM CST -----  Needs cbc and iron labs in 3 months

## 2023-01-03 ENCOUNTER — HOSPITAL ENCOUNTER (OUTPATIENT)
Dept: CARDIOLOGY | Facility: HOSPITAL | Age: 43
Discharge: HOME | End: 2023-01-03
Attending: HOSPITALIST
Payer: COMMERCIAL

## 2023-01-03 DIAGNOSIS — R00.2 PALPITATIONS: ICD-10-CM

## 2023-01-03 PROCEDURE — 93225 XTRNL ECG REC<48 HRS REC: CPT

## 2023-01-03 PROCEDURE — 93227 HOLTER MONITOR - 48 HOUR (CUPID ONLY): ICD-10-PCS | Mod: ,,, | Performed by: INTERNAL MEDICINE

## 2023-01-03 PROCEDURE — 93227 XTRNL ECG REC<48 HR R&I: CPT | Mod: ,,, | Performed by: INTERNAL MEDICINE

## 2023-01-06 LAB
OHS CV EVENT MONITOR DAY: 0
OHS CV HOLTER LENGTH DECIMAL HOURS: 48
OHS CV HOLTER LENGTH HOURS: 48
OHS CV HOLTER LENGTH MINUTES: 0
OHS CV HOLTER SINUS AVERAGE HR: 77
OHS CV HOLTER SINUS MAX HR: 140
OHS CV HOLTER SINUS MIN HR: 56

## 2023-01-25 ENCOUNTER — HOSPITAL ENCOUNTER (OUTPATIENT)
Dept: RADIOLOGY | Facility: OTHER | Age: 43
Discharge: HOME | End: 2023-01-25
Attending: STUDENT IN AN ORGANIZED HEALTH CARE EDUCATION/TRAINING PROGRAM
Payer: COMMERCIAL

## 2023-01-25 DIAGNOSIS — Z12.31 BREAST CANCER SCREENING BY MAMMOGRAM: ICD-10-CM

## 2023-01-25 DIAGNOSIS — Z01.419 WELL WOMAN EXAM WITH ROUTINE GYNECOLOGICAL EXAM: ICD-10-CM

## 2023-01-25 PROCEDURE — 77067 SCR MAMMO BI INCL CAD: CPT | Mod: 26,,, | Performed by: RADIOLOGY

## 2023-01-25 PROCEDURE — 77067 MAMMO DIGITAL SCREENING BILAT WITH TOMO: ICD-10-PCS | Mod: 26,,, | Performed by: RADIOLOGY

## 2023-01-25 PROCEDURE — 77063 BREAST TOMOSYNTHESIS BI: CPT | Mod: 26,,, | Performed by: RADIOLOGY

## 2023-01-25 PROCEDURE — 77063 MAMMO DIGITAL SCREENING BILAT WITH TOMO: ICD-10-PCS | Mod: 26,,, | Performed by: RADIOLOGY

## 2023-01-25 PROCEDURE — 77067 SCR MAMMO BI INCL CAD: CPT | Mod: TC

## 2023-01-26 ENCOUNTER — PATIENT MESSAGE (OUTPATIENT)
Dept: OBSTETRICS AND GYNECOLOGY | Facility: CLINIC | Age: 43
End: 2023-01-26
Payer: COMMERCIAL

## 2023-01-26 ENCOUNTER — TELEPHONE (OUTPATIENT)
Dept: OBSTETRICS AND GYNECOLOGY | Facility: CLINIC | Age: 43
End: 2023-01-26
Payer: COMMERCIAL

## 2023-01-26 NOTE — TELEPHONE ENCOUNTER
Pt called to see if there were another date option as she is not sure she will be able to get the time off due to her requesting the morning time.

## 2023-03-28 ENCOUNTER — PATIENT MESSAGE (OUTPATIENT)
Dept: INTERNAL MEDICINE | Facility: CLINIC | Age: 43
End: 2023-03-28
Payer: COMMERCIAL

## 2023-03-28 ENCOUNTER — LAB VISIT (OUTPATIENT)
Dept: LAB | Facility: HOSPITAL | Age: 43
End: 2023-03-28
Attending: HOSPITALIST
Payer: COMMERCIAL

## 2023-03-28 DIAGNOSIS — D50.9 IRON DEFICIENCY ANEMIA, UNSPECIFIED IRON DEFICIENCY ANEMIA TYPE: ICD-10-CM

## 2023-03-28 DIAGNOSIS — D50.9 IRON DEFICIENCY ANEMIA, UNSPECIFIED IRON DEFICIENCY ANEMIA TYPE: Primary | ICD-10-CM

## 2023-03-28 LAB
BASOPHILS # BLD AUTO: 0.05 K/UL (ref 0–0.2)
BASOPHILS NFR BLD: 0.6 % (ref 0–1.9)
DIFFERENTIAL METHOD: ABNORMAL
EOSINOPHIL # BLD AUTO: 0.3 K/UL (ref 0–0.5)
EOSINOPHIL NFR BLD: 3.6 % (ref 0–8)
ERYTHROCYTE [DISTWIDTH] IN BLOOD BY AUTOMATED COUNT: 18.7 % (ref 11.5–14.5)
FERRITIN SERPL-MCNC: 3 NG/ML (ref 20–300)
HCT VFR BLD AUTO: 31.6 % (ref 37–48.5)
HGB BLD-MCNC: 9.3 G/DL (ref 12–16)
IMM GRANULOCYTES # BLD AUTO: 0.02 K/UL (ref 0–0.04)
IMM GRANULOCYTES NFR BLD AUTO: 0.2 % (ref 0–0.5)
IRON SERPL-MCNC: 13 UG/DL (ref 30–160)
LYMPHOCYTES # BLD AUTO: 2.8 K/UL (ref 1–4.8)
LYMPHOCYTES NFR BLD: 34.5 % (ref 18–48)
MCH RBC QN AUTO: 21.1 PG (ref 27–31)
MCHC RBC AUTO-ENTMCNC: 29.4 G/DL (ref 32–36)
MCV RBC AUTO: 72 FL (ref 82–98)
MONOCYTES # BLD AUTO: 0.5 K/UL (ref 0.3–1)
MONOCYTES NFR BLD: 5.6 % (ref 4–15)
NEUTROPHILS # BLD AUTO: 4.5 K/UL (ref 1.8–7.7)
NEUTROPHILS NFR BLD: 55.5 % (ref 38–73)
NRBC BLD-RTO: 0 /100 WBC
PLATELET # BLD AUTO: 456 K/UL (ref 150–450)
PMV BLD AUTO: 10 FL (ref 9.2–12.9)
RBC # BLD AUTO: 4.4 M/UL (ref 4–5.4)
SATURATED IRON: 3 % (ref 20–50)
TOTAL IRON BINDING CAPACITY: 506 UG/DL (ref 250–450)
TRANSFERRIN SERPL-MCNC: 342 MG/DL (ref 200–375)
TRANSFERRIN SERPL-MCNC: 342 MG/DL (ref 200–375)
WBC # BLD AUTO: 8.05 K/UL (ref 3.9–12.7)

## 2023-03-28 PROCEDURE — 84466 ASSAY OF TRANSFERRIN: CPT | Performed by: HOSPITALIST

## 2023-03-28 PROCEDURE — 36415 COLL VENOUS BLD VENIPUNCTURE: CPT | Mod: PO | Performed by: HOSPITALIST

## 2023-03-28 PROCEDURE — 82728 ASSAY OF FERRITIN: CPT | Performed by: HOSPITALIST

## 2023-03-28 PROCEDURE — 85025 COMPLETE CBC W/AUTO DIFF WBC: CPT | Performed by: HOSPITALIST

## 2023-05-22 ENCOUNTER — OFFICE VISIT (OUTPATIENT)
Dept: HEMATOLOGY/ONCOLOGY | Facility: CLINIC | Age: 43
End: 2023-05-22
Payer: COMMERCIAL

## 2023-05-22 ENCOUNTER — LAB VISIT (OUTPATIENT)
Dept: LAB | Facility: HOSPITAL | Age: 43
End: 2023-05-22
Payer: COMMERCIAL

## 2023-05-22 VITALS
WEIGHT: 174.63 LBS | RESPIRATION RATE: 16 BRPM | TEMPERATURE: 98 F | HEIGHT: 64 IN | HEART RATE: 89 BPM | SYSTOLIC BLOOD PRESSURE: 108 MMHG | BODY MASS INDEX: 29.81 KG/M2 | DIASTOLIC BLOOD PRESSURE: 63 MMHG | OXYGEN SATURATION: 99 %

## 2023-05-22 DIAGNOSIS — D50.9 IRON DEFICIENCY ANEMIA, UNSPECIFIED IRON DEFICIENCY ANEMIA TYPE: Primary | ICD-10-CM

## 2023-05-22 DIAGNOSIS — D50.9 IRON DEFICIENCY ANEMIA, UNSPECIFIED IRON DEFICIENCY ANEMIA TYPE: ICD-10-CM

## 2023-05-22 LAB
ABO + RH BLD: NORMAL
ALBUMIN SERPL BCP-MCNC: 4 G/DL (ref 3.5–5.2)
ALP SERPL-CCNC: 71 U/L (ref 55–135)
ALT SERPL W/O P-5'-P-CCNC: 30 U/L (ref 10–44)
ANION GAP SERPL CALC-SCNC: 7 MMOL/L (ref 8–16)
AST SERPL-CCNC: 26 U/L (ref 10–40)
BASOPHILS # BLD AUTO: 0.08 K/UL (ref 0–0.2)
BASOPHILS NFR BLD: 0.8 % (ref 0–1.9)
BILIRUB SERPL-MCNC: 0.4 MG/DL (ref 0.1–1)
BLD GP AB SCN CELLS X3 SERPL QL: NORMAL
BUN SERPL-MCNC: 11 MG/DL (ref 6–20)
CALCIUM SERPL-MCNC: 9.5 MG/DL (ref 8.7–10.5)
CHLORIDE SERPL-SCNC: 103 MMOL/L (ref 95–110)
CO2 SERPL-SCNC: 26 MMOL/L (ref 23–29)
CREAT SERPL-MCNC: 0.7 MG/DL (ref 0.5–1.4)
DIFFERENTIAL METHOD: ABNORMAL
EOSINOPHIL # BLD AUTO: 0.3 K/UL (ref 0–0.5)
EOSINOPHIL NFR BLD: 3.1 % (ref 0–8)
ERYTHROCYTE [DISTWIDTH] IN BLOOD BY AUTOMATED COUNT: 19.9 % (ref 11.5–14.5)
EST. GFR  (NO RACE VARIABLE): >60 ML/MIN/1.73 M^2
FERRITIN SERPL-MCNC: 9 NG/ML (ref 20–300)
GLUCOSE SERPL-MCNC: 83 MG/DL (ref 70–110)
HCT VFR BLD AUTO: 33.7 % (ref 37–48.5)
HGB BLD-MCNC: 9.7 G/DL (ref 12–16)
IMM GRANULOCYTES # BLD AUTO: 0.05 K/UL (ref 0–0.04)
IMM GRANULOCYTES NFR BLD AUTO: 0.5 % (ref 0–0.5)
IRON SERPL-MCNC: 422 UG/DL (ref 30–160)
LYMPHOCYTES # BLD AUTO: 3.1 K/UL (ref 1–4.8)
LYMPHOCYTES NFR BLD: 30.8 % (ref 18–48)
MCH RBC QN AUTO: 20.7 PG (ref 27–31)
MCHC RBC AUTO-ENTMCNC: 28.8 G/DL (ref 32–36)
MCV RBC AUTO: 72 FL (ref 82–98)
MONOCYTES # BLD AUTO: 0.8 K/UL (ref 0.3–1)
MONOCYTES NFR BLD: 7.9 % (ref 4–15)
NEUTROPHILS # BLD AUTO: 5.8 K/UL (ref 1.8–7.7)
NEUTROPHILS NFR BLD: 56.9 % (ref 38–73)
NRBC BLD-RTO: 0 /100 WBC
PLATELET # BLD AUTO: 463 K/UL (ref 150–450)
PMV BLD AUTO: 9.8 FL (ref 9.2–12.9)
POTASSIUM SERPL-SCNC: 4.2 MMOL/L (ref 3.5–5.1)
PROT SERPL-MCNC: 7.6 G/DL (ref 6–8.4)
RBC # BLD AUTO: 4.68 M/UL (ref 4–5.4)
RH BLD: NORMAL
SATURATED IRON: 78 % (ref 20–50)
SODIUM SERPL-SCNC: 136 MMOL/L (ref 136–145)
SPECIMEN OUTDATE: NORMAL
TOTAL IRON BINDING CAPACITY: 540 UG/DL (ref 250–450)
TRANSFERRIN SERPL-MCNC: 365 MG/DL (ref 200–375)
WBC # BLD AUTO: 10.15 K/UL (ref 3.9–12.7)

## 2023-05-22 PROCEDURE — 1160F PR REVIEW ALL MEDS BY PRESCRIBER/CLIN PHARMACIST DOCUMENTED: ICD-10-PCS | Mod: CPTII,S$GLB,, | Performed by: INTERNAL MEDICINE

## 2023-05-22 PROCEDURE — 3074F SYST BP LT 130 MM HG: CPT | Mod: CPTII,S$GLB,, | Performed by: INTERNAL MEDICINE

## 2023-05-22 PROCEDURE — 36415 COLL VENOUS BLD VENIPUNCTURE: CPT | Performed by: PHYSICIAN ASSISTANT

## 2023-05-22 PROCEDURE — 1160F RVW MEDS BY RX/DR IN RCRD: CPT | Mod: CPTII,S$GLB,, | Performed by: INTERNAL MEDICINE

## 2023-05-22 PROCEDURE — 1159F PR MEDICATION LIST DOCUMENTED IN MEDICAL RECORD: ICD-10-PCS | Mod: CPTII,S$GLB,, | Performed by: INTERNAL MEDICINE

## 2023-05-22 PROCEDURE — 84466 ASSAY OF TRANSFERRIN: CPT | Performed by: PHYSICIAN ASSISTANT

## 2023-05-22 PROCEDURE — 99999 PR PBB SHADOW E&M-EST. PATIENT-LVL IV: CPT | Mod: PBBFAC,,, | Performed by: PHYSICIAN ASSISTANT

## 2023-05-22 PROCEDURE — 99999 PR PBB SHADOW E&M-EST. PATIENT-LVL IV: ICD-10-PCS | Mod: PBBFAC,,, | Performed by: PHYSICIAN ASSISTANT

## 2023-05-22 PROCEDURE — 1159F MED LIST DOCD IN RCRD: CPT | Mod: CPTII,S$GLB,, | Performed by: INTERNAL MEDICINE

## 2023-05-22 PROCEDURE — 82728 ASSAY OF FERRITIN: CPT | Performed by: PHYSICIAN ASSISTANT

## 2023-05-22 PROCEDURE — 3074F PR MOST RECENT SYSTOLIC BLOOD PRESSURE < 130 MM HG: ICD-10-PCS | Mod: CPTII,S$GLB,, | Performed by: INTERNAL MEDICINE

## 2023-05-22 PROCEDURE — 3008F PR BODY MASS INDEX (BMI) DOCUMENTED: ICD-10-PCS | Mod: CPTII,S$GLB,, | Performed by: INTERNAL MEDICINE

## 2023-05-22 PROCEDURE — 3078F DIAST BP <80 MM HG: CPT | Mod: CPTII,S$GLB,, | Performed by: INTERNAL MEDICINE

## 2023-05-22 PROCEDURE — 85025 COMPLETE CBC W/AUTO DIFF WBC: CPT | Performed by: PHYSICIAN ASSISTANT

## 2023-05-22 PROCEDURE — 86900 BLOOD TYPING SEROLOGIC ABO: CPT | Performed by: PHYSICIAN ASSISTANT

## 2023-05-22 PROCEDURE — 99204 PR OFFICE/OUTPT VISIT, NEW, LEVL IV, 45-59 MIN: ICD-10-PCS | Mod: S$GLB,,, | Performed by: INTERNAL MEDICINE

## 2023-05-22 PROCEDURE — 86901 BLOOD TYPING SEROLOGIC RH(D): CPT | Performed by: PHYSICIAN ASSISTANT

## 2023-05-22 PROCEDURE — 80053 COMPREHEN METABOLIC PANEL: CPT | Performed by: PHYSICIAN ASSISTANT

## 2023-05-22 PROCEDURE — 99204 OFFICE O/P NEW MOD 45 MIN: CPT | Mod: S$GLB,,, | Performed by: INTERNAL MEDICINE

## 2023-05-22 PROCEDURE — 3078F PR MOST RECENT DIASTOLIC BLOOD PRESSURE < 80 MM HG: ICD-10-PCS | Mod: CPTII,S$GLB,, | Performed by: INTERNAL MEDICINE

## 2023-05-22 PROCEDURE — 3008F BODY MASS INDEX DOCD: CPT | Mod: CPTII,S$GLB,, | Performed by: INTERNAL MEDICINE

## 2023-05-22 NOTE — PROGRESS NOTES
Section of Hematology and Stem Cell Transplantation  New Patient Consult     Referred by:  Dr. Doretha Hernandes  Date of visit: 5/22/23    Reason for visit: Iron deficiency anemia, unspecified iron deficiency anemia type [D50.9]    History of Present Ilness:   Chantel Salvador) is a pleasant 43 y.o.female with past medical history of MDD/YONG and iron deficiency anemia, referred for evaluation of ROSENDO. She reports chronic history of ROSENDO for many years, and takes liquid oral iron replacement. She has regular periods, with heavy bleeding the first 2-3 days of her cycle, and lighter bleeding for last 2-3 days. She has noticed worsening fatigue, STUBBS, and tingling of her arms that she attributes to iron being low. +PICA for ice. No melena/hematochezia/bowel complaints. No fhx of colon cancer. She also has been having headaches, questionable migraines, I've encouraged her to f/u with her PCP about this, but no red flag symptoms today.     Discussed proceeding with IV iron given symptomatic anemia not quickly responding to liquid iron.     Works as teacher at [employer].     PAST MEDICAL HISTORY:   Past Medical History:   Diagnosis Date    Herpes simplex virus (HSV) infection     never an outbreak       PAST SURGICAL HISTORY:   Past Surgical History:   Procedure Laterality Date    DILATION AND CURETTAGE OF UTERUS  2011    WISDOM TOOTH EXTRACTION  2015       PAST SOCIAL HISTORY:  Social History     Tobacco Use    Smoking status: Never    Smokeless tobacco: Never   Substance Use Topics    Alcohol use: Yes     Comment: socially    Drug use: Never       FAMILY HISTORY:  Family History   Problem Relation Age of Onset    Hypertension Mother     Hypertension Father     Cancer Maternal Grandmother         leukemia    Leukemia Maternal Grandmother     Cancer Maternal Grandfather     Cancer Paternal Grandmother     Cancer Paternal Grandfather     Glaucoma Maternal Aunt     Breast cancer Neg Hx     Colon cancer  Neg Hx     Ovarian cancer Neg Hx        CURRENT MEDICATIONS:   Current Outpatient Medications   Medication Sig    clonazePAM (KLONOPIN) 0.5 MG tablet Take 1 tablet (0.5 mg total) by mouth daily as needed for Anxiety (panic attacks).    ferrous sulfate (FEOSOL) 325 mg (65 mg iron) Tab tablet Take 1 tablet (325 mg total) by mouth daily with breakfast.    EScitalopram oxalate (LEXAPRO) 5 MG Tab Take 1 tablet (5 mg total) by mouth once daily. (Patient not taking: Reported on 5/22/2023)     No current facility-administered medications for this visit.       ALLERGIES:   Review of patient's allergies indicates:  No Known Allergies    Review of Systems:     Review of Systems   Constitutional:  Positive for malaise/fatigue.   HENT: Negative.     Eyes: Negative.    Respiratory: Negative.     Cardiovascular: Negative.    Gastrointestinal: Negative.    Genitourinary: Negative.    Musculoskeletal: Negative.    Skin: Negative.    Neurological:  Positive for tingling and headaches.   Endo/Heme/Allergies: Negative.    Psychiatric/Behavioral:  The patient is nervous/anxious.        Physical Exam:     Vitals:    05/22/23 1326   BP: 108/63   Pulse: 89   Resp: 16   Temp: 97.9 °F (36.6 °C)       Physical Exam  Constitutional:       Appearance: Normal appearance.   HENT:      Head: Normocephalic.      Right Ear: External ear normal.      Left Ear: External ear normal.      Nose: Nose normal.      Mouth/Throat:      Mouth: Mucous membranes are moist.      Pharynx: Oropharynx is clear.   Eyes:      Extraocular Movements: Extraocular movements intact.      Pupils: Pupils are equal, round, and reactive to light.   Cardiovascular:      Rate and Rhythm: Normal rate and regular rhythm.   Pulmonary:      Effort: Pulmonary effort is normal.   Abdominal:      General: Abdomen is flat.      Palpations: Abdomen is soft.   Musculoskeletal:         General: No swelling. Normal range of motion.      Cervical back: Neck supple.   Skin:     General: Skin  is warm.   Neurological:      General: No focal deficit present.      Mental Status: She is alert and oriented to person, place, and time.      Cranial Nerves: No cranial nerve deficit.   Psychiatric:         Mood and Affect: Mood normal.        Labs:   Lab Results   Component Value Date    WBC 8.05 03/28/2023    HGB 9.3 (L) 03/28/2023    HCT 31.6 (L) 03/28/2023    MCV 72 (L) 03/28/2023     (H) 03/28/2023       Lab Results   Component Value Date     12/24/2022    K 4.2 12/24/2022     12/24/2022    CO2 24 12/24/2022    BUN 10 12/24/2022    CREATININE 0.8 12/24/2022    ALBUMIN 3.8 12/24/2022    BILITOT 0.4 12/24/2022    ALKPHOS 64 12/24/2022    AST 16 12/24/2022    ALT 8 (L) 12/24/2022       Lab Results   Component Value Date    IRON 13 (L) 03/28/2023    TIBC 506 (H) 03/28/2023    FERRITIN 3 (L) 03/28/2023       No components found for: RETICULOCYTES    No results found for: HAPTOGLOBIN, LDH    Imaging:          Assessment and Plan:   Chantel Alas (Chantel) is a pleasant 43 y.o.female referred for evaluation of iron deficiency anemia.    Iron Deficiency Anemia  - Likely in setting of chronic blood loss related to menstrual cycles, patient reports chronic and has been on iron for many years. Denies hx of fibroids. No fhx of anemia/thalassemia. Will order FiOBT kit to evaluate for blood in stool, otherwise okay to proceed with colon cancer screening routinely once age appropriate   - Has PICA, fatigue  - Denies melena, hematochezia, no fhx of colon cancer and no concerning GI symptoms  - CMP WNL  - Tolerates oral iron (liquid only), but becoming more symptomatic related to iron deficiency with only modest improvement with oral intake  - Will proceed with IV iron, Injectafer series preferred by patient due to less time intensive  - Will repeat labs today    2. Headaches  - Pt with chronic headaches, previously seen by neurology a few years ago, now with headaches again. Associated with numbness/tingling  that resolves with rest. Encouraged pt f/u with PCP or if occurs again and severe seek emergent attention.       Orders/Follow Up:           BMT Chart Routing  Urgent    Follow up with physician    Follow up with BRITANY . Already scheduled   Provider visit type    Infusion scheduling note schedule injectafer series once authorized at main campus   Injection scheduling note    Labs    Imaging    Pharmacy appointment    Other referrals                Thank you for allowing me to partake in the care of this patient. If there are any questions, please do not hesitate to reach out.      Vikki Ayala PA-C  Hematology, Oncology, and Stem Cell Transplantation  [facility]

## 2023-05-23 RX ORDER — METHYLPREDNISOLONE SOD SUCC 125 MG
125 VIAL (EA) INJECTION ONCE AS NEEDED
Status: CANCELLED | OUTPATIENT
Start: 2023-05-31

## 2023-05-23 RX ORDER — SODIUM CHLORIDE 0.9 % (FLUSH) 0.9 %
10 SYRINGE (ML) INJECTION
Status: CANCELLED | OUTPATIENT
Start: 2023-05-31

## 2023-05-23 RX ORDER — DIPHENHYDRAMINE HYDROCHLORIDE 50 MG/ML
50 INJECTION INTRAMUSCULAR; INTRAVENOUS ONCE AS NEEDED
Status: CANCELLED | OUTPATIENT
Start: 2023-05-31

## 2023-05-23 RX ORDER — HEPARIN 100 UNIT/ML
5 SYRINGE INTRAVENOUS
Status: CANCELLED | OUTPATIENT
Start: 2023-05-31

## 2023-05-23 RX ORDER — SODIUM CHLORIDE 9 MG/ML
INJECTION, SOLUTION INTRAVENOUS CONTINUOUS
Status: CANCELLED | OUTPATIENT
Start: 2023-05-31

## 2023-05-23 RX ORDER — EPINEPHRINE 0.3 MG/.3ML
0.3 INJECTION SUBCUTANEOUS ONCE AS NEEDED
Status: CANCELLED | OUTPATIENT
Start: 2023-05-31

## 2023-06-14 ENCOUNTER — INFUSION (OUTPATIENT)
Dept: INFUSION THERAPY | Facility: HOSPITAL | Age: 43
End: 2023-06-14
Payer: COMMERCIAL

## 2023-06-14 VITALS
WEIGHT: 174.63 LBS | OXYGEN SATURATION: 100 % | SYSTOLIC BLOOD PRESSURE: 112 MMHG | DIASTOLIC BLOOD PRESSURE: 58 MMHG | BODY MASS INDEX: 29.97 KG/M2 | HEART RATE: 77 BPM | TEMPERATURE: 98 F | RESPIRATION RATE: 18 BRPM

## 2023-06-14 DIAGNOSIS — D50.9 IRON DEFICIENCY ANEMIA, UNSPECIFIED IRON DEFICIENCY ANEMIA TYPE: Primary | ICD-10-CM

## 2023-06-14 PROCEDURE — 25000003 PHARM REV CODE 250: Performed by: PHYSICIAN ASSISTANT

## 2023-06-14 PROCEDURE — 63600175 PHARM REV CODE 636 W HCPCS: Mod: JZ,JG | Performed by: PHYSICIAN ASSISTANT

## 2023-06-14 PROCEDURE — 96365 THER/PROPH/DIAG IV INF INIT: CPT

## 2023-06-14 RX ORDER — SODIUM CHLORIDE 0.9 % (FLUSH) 0.9 %
10 SYRINGE (ML) INJECTION
Status: DISCONTINUED | OUTPATIENT
Start: 2023-06-14 | End: 2023-06-14 | Stop reason: HOSPADM

## 2023-06-14 RX ORDER — SODIUM CHLORIDE 9 MG/ML
INJECTION, SOLUTION INTRAVENOUS CONTINUOUS
Status: CANCELLED | OUTPATIENT
Start: 2023-06-21

## 2023-06-14 RX ORDER — HEPARIN 100 UNIT/ML
5 SYRINGE INTRAVENOUS
Status: DISCONTINUED | OUTPATIENT
Start: 2023-06-14 | End: 2023-06-14 | Stop reason: HOSPADM

## 2023-06-14 RX ORDER — METHYLPREDNISOLONE SOD SUCC 125 MG
125 VIAL (EA) INJECTION ONCE AS NEEDED
Status: CANCELLED | OUTPATIENT
Start: 2023-06-21

## 2023-06-14 RX ORDER — EPINEPHRINE 0.3 MG/.3ML
0.3 INJECTION SUBCUTANEOUS ONCE AS NEEDED
Status: CANCELLED | OUTPATIENT
Start: 2023-06-21

## 2023-06-14 RX ORDER — EPINEPHRINE 0.3 MG/.3ML
0.3 INJECTION SUBCUTANEOUS ONCE AS NEEDED
Status: DISCONTINUED | OUTPATIENT
Start: 2023-06-14 | End: 2023-06-14 | Stop reason: HOSPADM

## 2023-06-14 RX ORDER — HEPARIN 100 UNIT/ML
5 SYRINGE INTRAVENOUS
Status: CANCELLED | OUTPATIENT
Start: 2023-06-21

## 2023-06-14 RX ORDER — DIPHENHYDRAMINE HYDROCHLORIDE 50 MG/ML
50 INJECTION INTRAMUSCULAR; INTRAVENOUS ONCE AS NEEDED
Status: CANCELLED | OUTPATIENT
Start: 2023-06-21

## 2023-06-14 RX ORDER — METHYLPREDNISOLONE SOD SUCC 125 MG
125 VIAL (EA) INJECTION ONCE AS NEEDED
Status: DISCONTINUED | OUTPATIENT
Start: 2023-06-14 | End: 2023-06-14 | Stop reason: HOSPADM

## 2023-06-14 RX ORDER — DIPHENHYDRAMINE HYDROCHLORIDE 50 MG/ML
50 INJECTION INTRAMUSCULAR; INTRAVENOUS ONCE AS NEEDED
Status: DISCONTINUED | OUTPATIENT
Start: 2023-06-14 | End: 2023-06-14 | Stop reason: HOSPADM

## 2023-06-14 RX ORDER — SODIUM CHLORIDE 0.9 % (FLUSH) 0.9 %
10 SYRINGE (ML) INJECTION
Status: CANCELLED | OUTPATIENT
Start: 2023-06-21

## 2023-06-14 RX ORDER — SODIUM CHLORIDE 9 MG/ML
INJECTION, SOLUTION INTRAVENOUS CONTINUOUS
Status: DISCONTINUED | OUTPATIENT
Start: 2023-06-14 | End: 2023-06-14 | Stop reason: HOSPADM

## 2023-06-14 RX ADMIN — FERRIC CARBOXYMALTOSE INJECTION 750 MG: 50 INJECTION, SOLUTION INTRAVENOUS at 02:06

## 2023-06-14 RX ADMIN — SODIUM CHLORIDE: 9 INJECTION, SOLUTION INTRAVENOUS at 02:06

## 2023-06-14 NOTE — PLAN OF CARE
6756 Patient tolerated injectafer #1 with no s/s of reaction during or for 30 minutes post infusion. PIV removed and catheter tip intact. AVS given to patient with educational handout on injectafer.Instructed to call MD office for any concerns. She understands she has another appt tomorrow for injectafer #2.

## 2023-06-14 NOTE — PLAN OF CARE
1340 Patient is here for injectafer #1. Labs, meds and hx reviewed. PIV inserted and NS started at the ordered rate of 250ml/hr. Shannon provided.

## 2023-06-15 ENCOUNTER — INFUSION (OUTPATIENT)
Dept: INFUSION THERAPY | Facility: HOSPITAL | Age: 43
End: 2023-06-15
Payer: COMMERCIAL

## 2023-06-15 VITALS
RESPIRATION RATE: 18 BRPM | HEART RATE: 77 BPM | SYSTOLIC BLOOD PRESSURE: 114 MMHG | OXYGEN SATURATION: 100 % | DIASTOLIC BLOOD PRESSURE: 62 MMHG

## 2023-06-15 DIAGNOSIS — D50.9 IRON DEFICIENCY ANEMIA, UNSPECIFIED IRON DEFICIENCY ANEMIA TYPE: ICD-10-CM

## 2023-06-15 RX ORDER — HEPARIN 100 UNIT/ML
5 SYRINGE INTRAVENOUS
Status: CANCELLED | OUTPATIENT
Start: 2023-06-21

## 2023-06-15 RX ORDER — DIPHENHYDRAMINE HYDROCHLORIDE 50 MG/ML
50 INJECTION INTRAMUSCULAR; INTRAVENOUS ONCE AS NEEDED
Status: CANCELLED | OUTPATIENT
Start: 2023-06-21

## 2023-06-15 RX ORDER — SODIUM CHLORIDE 0.9 % (FLUSH) 0.9 %
10 SYRINGE (ML) INJECTION
Status: CANCELLED | OUTPATIENT
Start: 2023-06-21

## 2023-06-15 RX ORDER — SODIUM CHLORIDE 9 MG/ML
INJECTION, SOLUTION INTRAVENOUS CONTINUOUS
Status: CANCELLED | OUTPATIENT
Start: 2023-06-21

## 2023-06-15 RX ORDER — EPINEPHRINE 0.3 MG/.3ML
0.3 INJECTION SUBCUTANEOUS ONCE AS NEEDED
Status: CANCELLED | OUTPATIENT
Start: 2023-06-21

## 2023-06-15 RX ORDER — METHYLPREDNISOLONE SOD SUCC 125 MG
125 VIAL (EA) INJECTION ONCE AS NEEDED
Status: CANCELLED | OUTPATIENT
Start: 2023-06-21

## 2023-06-15 NOTE — PLAN OF CARE
Patient received iron infusion yesterday. PIV was started prior to discovering previous infusion. Patient rescheduled for 6/22/2023 @ 3:00 pm. PIV removed with catheter intact. Patient ambulatory from clinic. NAD noted.

## 2023-06-22 ENCOUNTER — INFUSION (OUTPATIENT)
Dept: INFUSION THERAPY | Facility: HOSPITAL | Age: 43
End: 2023-06-22
Payer: COMMERCIAL

## 2023-06-22 VITALS
HEART RATE: 87 BPM | TEMPERATURE: 99 F | SYSTOLIC BLOOD PRESSURE: 118 MMHG | DIASTOLIC BLOOD PRESSURE: 63 MMHG | RESPIRATION RATE: 18 BRPM

## 2023-06-22 DIAGNOSIS — D50.9 IRON DEFICIENCY ANEMIA, UNSPECIFIED IRON DEFICIENCY ANEMIA TYPE: Primary | ICD-10-CM

## 2023-06-22 PROCEDURE — 96365 THER/PROPH/DIAG IV INF INIT: CPT

## 2023-06-22 PROCEDURE — 25000003 PHARM REV CODE 250: Performed by: PHYSICIAN ASSISTANT

## 2023-06-22 PROCEDURE — 63600175 PHARM REV CODE 636 W HCPCS: Mod: JZ,JG | Performed by: PHYSICIAN ASSISTANT

## 2023-06-22 RX ORDER — DIPHENHYDRAMINE HYDROCHLORIDE 50 MG/ML
50 INJECTION INTRAMUSCULAR; INTRAVENOUS ONCE AS NEEDED
Status: DISCONTINUED | OUTPATIENT
Start: 2023-06-22 | End: 2023-06-22 | Stop reason: HOSPADM

## 2023-06-22 RX ORDER — EPINEPHRINE 0.3 MG/.3ML
0.3 INJECTION SUBCUTANEOUS ONCE AS NEEDED
Status: DISCONTINUED | OUTPATIENT
Start: 2023-06-22 | End: 2023-06-22 | Stop reason: HOSPADM

## 2023-06-22 RX ORDER — HEPARIN 100 UNIT/ML
5 SYRINGE INTRAVENOUS
Status: CANCELLED | OUTPATIENT
Start: 2023-06-22

## 2023-06-22 RX ORDER — HEPARIN 100 UNIT/ML
5 SYRINGE INTRAVENOUS
Status: DISCONTINUED | OUTPATIENT
Start: 2023-06-22 | End: 2023-06-22 | Stop reason: HOSPADM

## 2023-06-22 RX ORDER — METHYLPREDNISOLONE SOD SUCC 125 MG
125 VIAL (EA) INJECTION ONCE AS NEEDED
Status: DISCONTINUED | OUTPATIENT
Start: 2023-06-22 | End: 2023-06-22 | Stop reason: HOSPADM

## 2023-06-22 RX ORDER — SODIUM CHLORIDE 9 MG/ML
INJECTION, SOLUTION INTRAVENOUS CONTINUOUS
Status: CANCELLED | OUTPATIENT
Start: 2023-06-22

## 2023-06-22 RX ORDER — METHYLPREDNISOLONE SOD SUCC 125 MG
125 VIAL (EA) INJECTION ONCE AS NEEDED
OUTPATIENT
Start: 2023-06-22

## 2023-06-22 RX ORDER — SODIUM CHLORIDE 0.9 % (FLUSH) 0.9 %
10 SYRINGE (ML) INJECTION
Status: CANCELLED | OUTPATIENT
Start: 2023-06-22

## 2023-06-22 RX ORDER — SODIUM CHLORIDE 0.9 % (FLUSH) 0.9 %
10 SYRINGE (ML) INJECTION
Status: DISCONTINUED | OUTPATIENT
Start: 2023-06-22 | End: 2023-06-22 | Stop reason: HOSPADM

## 2023-06-22 RX ORDER — EPINEPHRINE 0.3 MG/.3ML
0.3 INJECTION SUBCUTANEOUS ONCE AS NEEDED
OUTPATIENT
Start: 2023-06-22

## 2023-06-22 RX ORDER — SODIUM CHLORIDE 9 MG/ML
INJECTION, SOLUTION INTRAVENOUS CONTINUOUS
Status: DISCONTINUED | OUTPATIENT
Start: 2023-06-22 | End: 2023-06-22 | Stop reason: HOSPADM

## 2023-06-22 RX ORDER — DIPHENHYDRAMINE HYDROCHLORIDE 50 MG/ML
50 INJECTION INTRAMUSCULAR; INTRAVENOUS ONCE AS NEEDED
OUTPATIENT
Start: 2023-06-22

## 2023-06-22 RX ADMIN — SODIUM CHLORIDE: 9 INJECTION, SOLUTION INTRAVENOUS at 01:06

## 2023-06-22 RX ADMIN — FERRIC CARBOXYMALTOSE INJECTION 750 MG: 50 INJECTION, SOLUTION INTRAVENOUS at 01:06

## 2023-06-22 NOTE — PLAN OF CARE
Problem: Adult Inpatient Plan of Care  Goal: Plan of Care Review  Outcome: Ongoing, Progressing   Patient tolerated injectafer # 2 today. NAD noted. Discharged home and ambulates independently

## 2023-09-19 NOTE — TELEPHONE ENCOUNTER
No care due was identified.  [facility] Embedded Care Due Messages. Reference number: [id number].   9/19/2023 12:13:26 PM CDT

## 2023-09-20 RX ORDER — ESCITALOPRAM OXALATE 5 MG/1
5 TABLET ORAL DAILY
Qty: 30 TABLET | Refills: 11 | Status: SHIPPED | OUTPATIENT
Start: 2023-09-20 | End: 2023-11-13

## 2023-09-20 RX ORDER — CLONAZEPAM 0.5 MG/1
0.5 TABLET ORAL DAILY PRN
Qty: 30 TABLET | Refills: 0 | Status: SHIPPED | OUTPATIENT
Start: 2023-09-20 | End: 2024-09-19

## 2023-11-13 ENCOUNTER — OFFICE VISIT (OUTPATIENT)
Dept: INTERNAL MEDICINE | Facility: CLINIC | Age: 43
End: 2023-11-13
Payer: COMMERCIAL

## 2023-11-13 ENCOUNTER — LAB VISIT (OUTPATIENT)
Dept: LAB | Facility: HOSPITAL | Age: 43
End: 2023-11-13
Attending: HOSPITALIST
Payer: COMMERCIAL

## 2023-11-13 VITALS
WEIGHT: 177.69 LBS | OXYGEN SATURATION: 100 % | HEIGHT: 64 IN | BODY MASS INDEX: 30.34 KG/M2 | HEART RATE: 80 BPM | TEMPERATURE: 97 F | RESPIRATION RATE: 18 BRPM | DIASTOLIC BLOOD PRESSURE: 70 MMHG | SYSTOLIC BLOOD PRESSURE: 112 MMHG

## 2023-11-13 DIAGNOSIS — R19.8 CHANGE IN BOWEL MOVEMENT: ICD-10-CM

## 2023-11-13 DIAGNOSIS — E66.09 CLASS 1 OBESITY DUE TO EXCESS CALORIES WITHOUT SERIOUS COMORBIDITY WITH BODY MASS INDEX (BMI) OF 32.0 TO 32.9 IN ADULT: ICD-10-CM

## 2023-11-13 DIAGNOSIS — Z00.00 ANNUAL PHYSICAL EXAM: Primary | ICD-10-CM

## 2023-11-13 DIAGNOSIS — J06.9 UPPER RESPIRATORY TRACT INFECTION, UNSPECIFIED TYPE: ICD-10-CM

## 2023-11-13 DIAGNOSIS — F41.1 GENERALIZED ANXIETY DISORDER WITH PANIC ATTACKS: ICD-10-CM

## 2023-11-13 DIAGNOSIS — Z00.00 ANNUAL PHYSICAL EXAM: ICD-10-CM

## 2023-11-13 DIAGNOSIS — F41.0 GENERALIZED ANXIETY DISORDER WITH PANIC ATTACKS: ICD-10-CM

## 2023-11-13 DIAGNOSIS — Z12.31 ENCOUNTER FOR SCREENING MAMMOGRAM FOR BREAST CANCER: ICD-10-CM

## 2023-11-13 DIAGNOSIS — F33.9 EPISODE OF RECURRENT MAJOR DEPRESSIVE DISORDER, UNSPECIFIED DEPRESSION EPISODE SEVERITY: ICD-10-CM

## 2023-11-13 DIAGNOSIS — D50.9 IRON DEFICIENCY ANEMIA, UNSPECIFIED IRON DEFICIENCY ANEMIA TYPE: ICD-10-CM

## 2023-11-13 DIAGNOSIS — G35 MULTIPLE SCLEROSIS: ICD-10-CM

## 2023-11-13 LAB
ALBUMIN SERPL BCP-MCNC: 3.8 G/DL (ref 3.5–5.2)
ALP SERPL-CCNC: 64 U/L (ref 55–135)
ALT SERPL W/O P-5'-P-CCNC: 12 U/L (ref 10–44)
ANION GAP SERPL CALC-SCNC: 9 MMOL/L (ref 8–16)
AST SERPL-CCNC: 18 U/L (ref 10–40)
BASOPHILS # BLD AUTO: 0.05 K/UL (ref 0–0.2)
BASOPHILS NFR BLD: 0.5 % (ref 0–1.9)
BILIRUB SERPL-MCNC: 0.3 MG/DL (ref 0.1–1)
BUN SERPL-MCNC: 7 MG/DL (ref 6–20)
CALCIUM SERPL-MCNC: 9.4 MG/DL (ref 8.7–10.5)
CHLORIDE SERPL-SCNC: 105 MMOL/L (ref 95–110)
CHOLEST SERPL-MCNC: 152 MG/DL (ref 120–199)
CHOLEST/HDLC SERPL: 3.2 {RATIO} (ref 2–5)
CO2 SERPL-SCNC: 26 MMOL/L (ref 23–29)
CREAT SERPL-MCNC: 0.7 MG/DL (ref 0.5–1.4)
DIFFERENTIAL METHOD: ABNORMAL
EOSINOPHIL # BLD AUTO: 0.4 K/UL (ref 0–0.5)
EOSINOPHIL NFR BLD: 3.7 % (ref 0–8)
ERYTHROCYTE [DISTWIDTH] IN BLOOD BY AUTOMATED COUNT: 13.4 % (ref 11.5–14.5)
EST. GFR  (NO RACE VARIABLE): >60 ML/MIN/1.73 M^2
ESTIMATED AVG GLUCOSE: 108 MG/DL (ref 68–131)
FERRITIN SERPL-MCNC: 94 NG/ML (ref 20–300)
GLUCOSE SERPL-MCNC: 84 MG/DL (ref 70–110)
HBA1C MFR BLD: 5.4 % (ref 4–5.6)
HCT VFR BLD AUTO: 39.5 % (ref 37–48.5)
HDLC SERPL-MCNC: 47 MG/DL (ref 40–75)
HDLC SERPL: 30.9 % (ref 20–50)
HGB BLD-MCNC: 11.9 G/DL (ref 12–16)
IMM GRANULOCYTES # BLD AUTO: 0.03 K/UL (ref 0–0.04)
IMM GRANULOCYTES NFR BLD AUTO: 0.3 % (ref 0–0.5)
IRON SERPL-MCNC: 59 UG/DL (ref 30–160)
LDLC SERPL CALC-MCNC: 92.4 MG/DL (ref 63–159)
LYMPHOCYTES # BLD AUTO: 2.4 K/UL (ref 1–4.8)
LYMPHOCYTES NFR BLD: 24.3 % (ref 18–48)
MCH RBC QN AUTO: 27.6 PG (ref 27–31)
MCHC RBC AUTO-ENTMCNC: 30.1 G/DL (ref 32–36)
MCV RBC AUTO: 92 FL (ref 82–98)
MONOCYTES # BLD AUTO: 0.7 K/UL (ref 0.3–1)
MONOCYTES NFR BLD: 6.8 % (ref 4–15)
NEUTROPHILS # BLD AUTO: 6.2 K/UL (ref 1.8–7.7)
NEUTROPHILS NFR BLD: 64.4 % (ref 38–73)
NONHDLC SERPL-MCNC: 105 MG/DL
NRBC BLD-RTO: 0 /100 WBC
PLATELET # BLD AUTO: 303 K/UL (ref 150–450)
PMV BLD AUTO: 10.6 FL (ref 9.2–12.9)
POTASSIUM SERPL-SCNC: 4.3 MMOL/L (ref 3.5–5.1)
PROT SERPL-MCNC: 7 G/DL (ref 6–8.4)
RBC # BLD AUTO: 4.31 M/UL (ref 4–5.4)
SARS-COV-2 RNA RESP QL NAA+PROBE: NOT DETECTED
SATURATED IRON: 16 % (ref 20–50)
SODIUM SERPL-SCNC: 140 MMOL/L (ref 136–145)
TOTAL IRON BINDING CAPACITY: 371 UG/DL (ref 250–450)
TRANSFERRIN SERPL-MCNC: 251 MG/DL (ref 200–375)
TRANSFERRIN SERPL-MCNC: 251 MG/DL (ref 200–375)
TRIGL SERPL-MCNC: 63 MG/DL (ref 30–150)
TSH SERPL DL<=0.005 MIU/L-ACNC: 1.78 UIU/ML (ref 0.4–4)
WBC # BLD AUTO: 9.67 K/UL (ref 3.9–12.7)

## 2023-11-13 PROCEDURE — 3078F DIAST BP <80 MM HG: CPT | Mod: CPTII,S$GLB,, | Performed by: HOSPITALIST

## 2023-11-13 PROCEDURE — 3008F BODY MASS INDEX DOCD: CPT | Mod: CPTII,S$GLB,, | Performed by: HOSPITALIST

## 2023-11-13 PROCEDURE — 1160F RVW MEDS BY RX/DR IN RCRD: CPT | Mod: CPTII,S$GLB,, | Performed by: HOSPITALIST

## 2023-11-13 PROCEDURE — 80053 COMPREHEN METABOLIC PANEL: CPT | Performed by: HOSPITALIST

## 2023-11-13 PROCEDURE — 99396 PREV VISIT EST AGE 40-64: CPT | Mod: S$GLB,,, | Performed by: HOSPITALIST

## 2023-11-13 PROCEDURE — 3074F PR MOST RECENT SYSTOLIC BLOOD PRESSURE < 130 MM HG: ICD-10-PCS | Mod: CPTII,S$GLB,, | Performed by: HOSPITALIST

## 2023-11-13 PROCEDURE — 83540 ASSAY OF IRON: CPT | Performed by: HOSPITALIST

## 2023-11-13 PROCEDURE — 87635 SARS-COV-2 COVID-19 AMP PRB: CPT | Performed by: HOSPITALIST

## 2023-11-13 PROCEDURE — 85025 COMPLETE CBC W/AUTO DIFF WBC: CPT | Performed by: HOSPITALIST

## 2023-11-13 PROCEDURE — 82728 ASSAY OF FERRITIN: CPT | Performed by: HOSPITALIST

## 2023-11-13 PROCEDURE — 99999 PR PBB SHADOW E&M-EST. PATIENT-LVL V: CPT | Mod: PBBFAC,,, | Performed by: HOSPITALIST

## 2023-11-13 PROCEDURE — 99999 PR PBB SHADOW E&M-EST. PATIENT-LVL V: ICD-10-PCS | Mod: PBBFAC,,, | Performed by: HOSPITALIST

## 2023-11-13 PROCEDURE — 84443 ASSAY THYROID STIM HORMONE: CPT | Performed by: HOSPITALIST

## 2023-11-13 PROCEDURE — 84466 ASSAY OF TRANSFERRIN: CPT | Performed by: HOSPITALIST

## 2023-11-13 PROCEDURE — 80061 LIPID PANEL: CPT | Performed by: HOSPITALIST

## 2023-11-13 PROCEDURE — 3078F PR MOST RECENT DIASTOLIC BLOOD PRESSURE < 80 MM HG: ICD-10-PCS | Mod: CPTII,S$GLB,, | Performed by: HOSPITALIST

## 2023-11-13 PROCEDURE — 3074F SYST BP LT 130 MM HG: CPT | Mod: CPTII,S$GLB,, | Performed by: HOSPITALIST

## 2023-11-13 PROCEDURE — 1159F PR MEDICATION LIST DOCUMENTED IN MEDICAL RECORD: ICD-10-PCS | Mod: CPTII,S$GLB,, | Performed by: HOSPITALIST

## 2023-11-13 PROCEDURE — 1159F MED LIST DOCD IN RCRD: CPT | Mod: CPTII,S$GLB,, | Performed by: HOSPITALIST

## 2023-11-13 PROCEDURE — 99396 PR PREVENTIVE VISIT,EST,40-64: ICD-10-PCS | Mod: S$GLB,,, | Performed by: HOSPITALIST

## 2023-11-13 PROCEDURE — 36415 COLL VENOUS BLD VENIPUNCTURE: CPT | Mod: PO | Performed by: HOSPITALIST

## 2023-11-13 PROCEDURE — 83036 HEMOGLOBIN GLYCOSYLATED A1C: CPT | Performed by: HOSPITALIST

## 2023-11-13 PROCEDURE — 3008F PR BODY MASS INDEX (BMI) DOCUMENTED: ICD-10-PCS | Mod: CPTII,S$GLB,, | Performed by: HOSPITALIST

## 2023-11-13 PROCEDURE — 1160F PR REVIEW ALL MEDS BY PRESCRIBER/CLIN PHARMACIST DOCUMENTED: ICD-10-PCS | Mod: CPTII,S$GLB,, | Performed by: HOSPITALIST

## 2023-11-13 RX ORDER — ESCITALOPRAM OXALATE 10 MG/1
10 TABLET ORAL DAILY
Qty: 30 TABLET | Refills: 11 | Status: SHIPPED | OUTPATIENT
Start: 2023-11-13 | End: 2024-11-12

## 2023-11-13 RX ORDER — BENZONATATE 100 MG/1
100 CAPSULE ORAL 3 TIMES DAILY PRN
Qty: 30 CAPSULE | Refills: 0 | Status: SHIPPED | OUTPATIENT
Start: 2023-11-13 | End: 2023-11-23

## 2023-11-13 NOTE — PROGRESS NOTES
Subjective:     @Patient ID: Chantel Alas is a 43 y.o. female.    Chief Complaint: Annual Exam, Cough, and Sore Throat    HPI    43 y.o. female with anxiety, depression, ROSENDO presents here for annual exam. She works as a  for [employer]      1. Anxiety/depression: reports mood is still down and having panic attacks. Would like to increase lexapro to 10 mg qday reports she would take 2 tabs of lexapro 5 mg to help. Reports klonopin makes her too sleepy    2. Reports occasionally waking up in the middle of night with urge to have b.m. reports it will take awhile to have a b.m. reports this is new for her. Reports b.m.s are usually 1-2x/day and no constipation. Would like to see a GI specialist     3. ROSENDO: reports has not yet f/u with heme-onc. Does take daily iron supplement. Reports she does not feel that it is affecting her b.m.    4. Cough and PND x 3-4 days. Reports would like covid test. Has tried OTC flu and cold medication        Lipid disorders/ASCVD risk (ages >/= 45 or >/= 20 if increased risk ): ordered  DM (>45y yearly or if obese, HTN): A1c ordered  Eye exam:     Breast Cancer (40-50y discretion of pt, 50-74y every 1-2 years): Mammogram utd   Cervical Cancer (Pap Smear ages 21-65 every 3 years or Pap + HPV q5 years after 30 years of age): Done 10/15/2019        Vaccines:   Influenza (yearly) declines  Tetanus (every 10 yrs - 1st tdap)    utd 2021  Covid19: due for booster        Exercise: none  Diet:  regular    Review of Systems   Constitutional:  Negative for activity change and unexpected weight change.   HENT:  Positive for postnasal drip. Negative for hearing loss, rhinorrhea and trouble swallowing.    Eyes:  Positive for visual disturbance. Negative for discharge.   Respiratory:  Positive for cough. Negative for chest tightness and wheezing.    Cardiovascular:  Negative for chest pain.   Gastrointestinal:  Negative for blood in stool, constipation, diarrhea and vomiting.  "  Endocrine: Negative for polydipsia and polyuria.   Genitourinary:  Positive for menstrual problem. Negative for difficulty urinating, dysuria and hematuria.   Musculoskeletal:  Negative for arthralgias, joint swelling and neck pain.   Neurological:  Positive for headaches. Negative for weakness.   Psychiatric/Behavioral:  Positive for dysphoric mood. Negative for confusion.      Past medical history, surgical history, and family medical history reviewed and updated as appropriate.    Medications and allergies reviewed.     Objective:     Vitals:    11/13/23 0958   BP: 112/70   BP Location: Left arm   Patient Position: Sitting   Pulse: 80   Resp: 18   Temp: 96.8 °F (36 °C)   SpO2: 100%   Weight: 80.6 kg (177 lb 11.1 oz)   Height: 5' 4" (1.626 m)     Body mass index is 30.5 kg/m².  Physical Exam  Vitals reviewed.   Constitutional:       General: She is not in acute distress.     Appearance: She is well-developed.   HENT:      Head: Normocephalic and atraumatic.      Right Ear: Tympanic membrane normal.      Left Ear: Tympanic membrane normal.      Mouth/Throat:      Mouth: Mucous membranes are moist.      Pharynx: No oropharyngeal exudate.   Eyes:      General:         Right eye: No discharge.         Left eye: No discharge.      Conjunctiva/sclera: Conjunctivae normal.   Cardiovascular:      Rate and Rhythm: Normal rate and regular rhythm.      Heart sounds: No murmur heard.     No friction rub.   Pulmonary:      Effort: Pulmonary effort is normal.      Breath sounds: Normal breath sounds.   Abdominal:      General: Bowel sounds are normal. There is no distension.      Palpations: Abdomen is soft.      Tenderness: There is no abdominal tenderness. There is no guarding.   Musculoskeletal:         General: Normal range of motion.      Cervical back: Normal range of motion and neck supple.      Right lower leg: No edema.      Left lower leg: No edema.   Lymphadenopathy:      Cervical: No cervical adenopathy.   Skin:   "   General: Skin is warm and dry.   Neurological:      Mental Status: She is alert and oriented to person, place, and time.   Psychiatric:         Mood and Affect: Mood is depressed.         Behavior: Behavior normal.         Lab Results   Component Value Date    WBC 10.15 05/22/2023    HGB 9.7 (L) 05/22/2023    HCT 33.7 (L) 05/22/2023     (H) 05/22/2023    CHOL 145 12/24/2022    TRIG 60 12/24/2022    HDL 50 12/24/2022    ALT 30 05/22/2023    AST 26 05/22/2023     05/22/2023    K 4.2 05/22/2023     05/22/2023    CREATININE 0.7 05/22/2023    BUN 11 05/22/2023    CO2 26 05/22/2023    TSH 1.878 12/24/2022    HGBA1C 5.6 12/24/2022       Assessment:     1. Annual physical exam    2. Iron deficiency anemia, unspecified iron deficiency anemia type    3. Class 1 obesity due to excess calories without serious comorbidity with body mass index (BMI) of 32.0 to 32.9 in adult    4. Encounter for screening mammogram for breast cancer    5. Change in bowel movement    6. Multiple sclerosis    7. Upper respiratory tract infection, unspecified type    8. Episode of recurrent major depressive disorder, unspecified depression episode severity    9. Generalized anxiety disorder with panic attacks      Plan:   Chantel was seen today for annual exam, cough and sore throat.    Diagnoses and all orders for this visit:    Annual physical exam  -     Iron and TIBC; Future  -     Ferritin; Future  -     Transferrin; Future  -     Comprehensive Metabolic Panel; Future  -     CBC Auto Differential; Future  -     Lipid Panel; Future  -     TSH; Future  -     Hemoglobin A1C; Future    Iron deficiency anemia, unspecified iron deficiency anemia type  -     Iron and TIBC; Future  -     Ferritin; Future  -     Transferrin; Future  -     Comprehensive Metabolic Panel; Future  -     CBC Auto Differential; Future  -     Lipid Panel; Future  -     TSH; Future  -     Hemoglobin A1C; Future    Class 1 obesity due to excess calories without  serious comorbidity with body mass index (BMI) of 32.0 to 32.9 in adult  -     Iron and TIBC; Future  -     Ferritin; Future  -     Transferrin; Future  -     Comprehensive Metabolic Panel; Future  -     CBC Auto Differential; Future  -     Lipid Panel; Future  -     TSH; Future  -     Hemoglobin A1C; Future    Encounter for screening mammogram for breast cancer  -     Mammo Digital Screening Bilat w/ Michael; Future    Change in bowel movement  -     Ambulatory referral/consult to Gastroenterology; Future    Multiple sclerosis  - Was being evaluated by neurology. Has not followed up since her last neurologist retired   -     Ambulatory referral/consult to Neurology; Future    Upper respiratory tract infection, unspecified type  -     COVID-19 Routine Screening    Episode of recurrent major depressive disorder, unspecified depression episode severity  Generalized anxiety disorder with panic attacks  - increase lexapro to 10 mg qday     Other orders  -     benzonatate (TESSALON) 100 MG capsule; Take 1 capsule (100 mg total) by mouth 3 (three) times daily as needed.  -     EScitalopram oxalate (LEXAPRO) 10 MG tablet; Take 1 tablet (10 mg total) by mouth once daily.          No follow-ups on file.    Doretha Hernandes MD  Internal Medicine    11/13/2023

## 2023-12-14 NOTE — PROGRESS NOTES
History & Physical  Gynecology      SUBJECTIVE:     Chief Complaint: Well Woman         History of Present Illness:  Chantel is here for annual exam. No complaints    Menstrual History: menarche age 11, reports periods are regular, every 28 days lasting 5 days. Started getting heavier this past 8 months or so. first two days, super pad changed every two hours  LMP:   STD/STI Hx: HPV  Contraception Hx: none  Sexually Active: male one in last year  GYN surgery: none  Family history: Denies any personal or family history of GYN/colon cancers   Social: Wears seatbelts. Exercises some. Feels safe at home.  Last pap: 10/2019 normal, HPV positive for other HR strains. Same in . Colpo easton 1> pap  negative with other HPV> ACUS with other HPV   Last mammo: 2023 normal  Flu: interested   HPV vaccine: not interested  Covid vaccined yes  Labs utd  Vitamins: yes      Review of patient's allergies indicates:  No Known Allergies    Past Medical History:   Diagnosis Date    Herpes simplex virus (HSV) infection     never an outbreak     Past Surgical History:   Procedure Laterality Date    DILATION AND CURETTAGE OF UTERUS      WISDOM TOOTH EXTRACTION       OB History          1    Para   0    Term   0            AB   1    Living             SAB   1    IAB        Ectopic        Multiple        Live Births                   Family History   Problem Relation Age of Onset    Hypertension Mother     Hypertension Father     Cancer Maternal Grandmother         leukemia    Leukemia Maternal Grandmother     Cancer Maternal Grandfather     Cancer Paternal Grandmother     Cancer Paternal Grandfather     Glaucoma Maternal Aunt     Breast cancer Neg Hx     Colon cancer Neg Hx     Ovarian cancer Neg Hx      Social History     Tobacco Use    Smoking status: Never    Smokeless tobacco: Never   Substance Use Topics    Alcohol use: Yes     Comment: socially    Drug use: Never       Current Outpatient  Medications   Medication Sig    clonazePAM (KLONOPIN) 0.5 MG tablet Take 1 tablet (0.5 mg total) by mouth daily as needed for Anxiety (panic attacks).    EScitalopram oxalate (LEXAPRO) 10 MG tablet Take 1 tablet (10 mg total) by mouth once daily.    ferrous sulfate (FEOSOL) 325 mg (65 mg iron) Tab tablet Take 1 tablet (325 mg total) by mouth daily with breakfast.    ibuprofen (ADVIL,MOTRIN) 800 MG tablet Take 1 tablet (800 mg total) by mouth 3 (three) times daily.     No current facility-administered medications for this visit.         Review of Systems:  Review of Systems   Constitutional:  Negative for activity change, appetite change, fever and unexpected weight change.   Respiratory:  Negative for shortness of breath.    Cardiovascular:  Negative for chest pain.   Gastrointestinal:  Negative for abdominal pain, blood in stool, constipation, diarrhea, nausea and vomiting.   Genitourinary:  Positive for menorrhagia. Negative for dysmenorrhea, dysuria, hematuria, menstrual problem, pelvic pain, vaginal bleeding, vaginal discharge, vaginal pain and vaginal odor.   Integumentary:  Negative for breast mass.   Breast: Negative for lump and mass       OBJECTIVE:     Physical Exam:  Physical Exam  Vitals reviewed.   Constitutional:       General: She is not in acute distress.     Appearance: She is well-developed. She is not diaphoretic.   HENT:      Head: Normocephalic and atraumatic.   Eyes:      General: No scleral icterus.        Right eye: No discharge.         Left eye: No discharge.      Conjunctiva/sclera: Conjunctivae normal.   Neck:      Thyroid: No thyromegaly.   Cardiovascular:      Rate and Rhythm: Normal rate.   Pulmonary:      Effort: Pulmonary effort is normal.   Chest:   Breasts:     Breasts are symmetrical.      Right: No inverted nipple, mass, nipple discharge, skin change or tenderness.      Left: No inverted nipple, mass, nipple discharge, skin change or tenderness.   Abdominal:      General: There  is no distension.      Palpations: Abdomen is soft.      Tenderness: There is no abdominal tenderness.   Genitourinary:     Labia:         Right: No rash, tenderness, lesion or injury.         Left: No rash, tenderness, lesion or injury.       Vagina: Normal. No signs of injury and foreign body. No vaginal discharge, erythema, tenderness or bleeding.      Cervix: No cervical motion tenderness, discharge or friability.      Uterus: Not deviated, not enlarged, not fixed and not tender.       Adnexa:         Right: No mass, tenderness or fullness.          Left: No mass, tenderness or fullness.     Musculoskeletal:         General: Normal range of motion.      Cervical back: Normal range of motion and neck supple.   Lymphadenopathy:      Cervical: No cervical adenopathy.   Skin:     General: Skin is warm and dry.      Findings: No erythema or rash.   Neurological:      Mental Status: She is alert and oriented to person, place, and time.         ASSESSMENT:       ICD-10-CM ICD-9-CM    1. Well woman exam with routine gynecological exam  Z01.419 V72.31                Plan:      WWE  - Vaccines utd  - Pap and cotest repeated today. If recurrent HPV, consider yearly colpo instead of pap. Does not want LEEP or hyst  - Mammogram ordered  - GC/CT collected  - Daily vitamin discussed.  - Consistent condom use is recommended.   - CBE normal. Physical exam normal. VSS.  - RTC for annual or PRN.        Counseling time: 30 minutes    Vero Wilkins

## 2023-12-15 ENCOUNTER — OFFICE VISIT (OUTPATIENT)
Dept: OBSTETRICS AND GYNECOLOGY | Facility: CLINIC | Age: 43
End: 2023-12-15
Payer: COMMERCIAL

## 2023-12-15 ENCOUNTER — PATIENT MESSAGE (OUTPATIENT)
Dept: OBSTETRICS AND GYNECOLOGY | Facility: CLINIC | Age: 43
End: 2023-12-15
Payer: COMMERCIAL

## 2023-12-15 VITALS
WEIGHT: 180.13 LBS | SYSTOLIC BLOOD PRESSURE: 112 MMHG | BODY MASS INDEX: 30.75 KG/M2 | DIASTOLIC BLOOD PRESSURE: 80 MMHG | HEIGHT: 64 IN

## 2023-12-15 DIAGNOSIS — Z01.419 WELL WOMAN EXAM WITH ROUTINE GYNECOLOGICAL EXAM: Primary | ICD-10-CM

## 2023-12-15 PROCEDURE — 88141 PR  CYTOPATH CERV/VAG INTERPRET: ICD-10-PCS | Mod: ,,, | Performed by: PATHOLOGY

## 2023-12-15 PROCEDURE — 88141 CYTOPATH C/V INTERPRET: CPT | Mod: ,,, | Performed by: PATHOLOGY

## 2023-12-15 PROCEDURE — 3079F PR MOST RECENT DIASTOLIC BLOOD PRESSURE 80-89 MM HG: ICD-10-PCS | Mod: CPTII,S$GLB,, | Performed by: STUDENT IN AN ORGANIZED HEALTH CARE EDUCATION/TRAINING PROGRAM

## 2023-12-15 PROCEDURE — 1159F MED LIST DOCD IN RCRD: CPT | Mod: CPTII,S$GLB,, | Performed by: STUDENT IN AN ORGANIZED HEALTH CARE EDUCATION/TRAINING PROGRAM

## 2023-12-15 PROCEDURE — 3079F DIAST BP 80-89 MM HG: CPT | Mod: CPTII,S$GLB,, | Performed by: STUDENT IN AN ORGANIZED HEALTH CARE EDUCATION/TRAINING PROGRAM

## 2023-12-15 PROCEDURE — 3044F PR MOST RECENT HEMOGLOBIN A1C LEVEL <7.0%: ICD-10-PCS | Mod: CPTII,S$GLB,, | Performed by: STUDENT IN AN ORGANIZED HEALTH CARE EDUCATION/TRAINING PROGRAM

## 2023-12-15 PROCEDURE — 3074F PR MOST RECENT SYSTOLIC BLOOD PRESSURE < 130 MM HG: ICD-10-PCS | Mod: CPTII,S$GLB,, | Performed by: STUDENT IN AN ORGANIZED HEALTH CARE EDUCATION/TRAINING PROGRAM

## 2023-12-15 PROCEDURE — 3044F HG A1C LEVEL LT 7.0%: CPT | Mod: CPTII,S$GLB,, | Performed by: STUDENT IN AN ORGANIZED HEALTH CARE EDUCATION/TRAINING PROGRAM

## 2023-12-15 PROCEDURE — 88175 CYTOPATH C/V AUTO FLUID REDO: CPT | Performed by: PATHOLOGY

## 2023-12-15 PROCEDURE — 87624 HPV HI-RISK TYP POOLED RSLT: CPT | Performed by: STUDENT IN AN ORGANIZED HEALTH CARE EDUCATION/TRAINING PROGRAM

## 2023-12-15 PROCEDURE — 3074F SYST BP LT 130 MM HG: CPT | Mod: CPTII,S$GLB,, | Performed by: STUDENT IN AN ORGANIZED HEALTH CARE EDUCATION/TRAINING PROGRAM

## 2023-12-15 PROCEDURE — 3008F PR BODY MASS INDEX (BMI) DOCUMENTED: ICD-10-PCS | Mod: CPTII,S$GLB,, | Performed by: STUDENT IN AN ORGANIZED HEALTH CARE EDUCATION/TRAINING PROGRAM

## 2023-12-15 PROCEDURE — 3008F BODY MASS INDEX DOCD: CPT | Mod: CPTII,S$GLB,, | Performed by: STUDENT IN AN ORGANIZED HEALTH CARE EDUCATION/TRAINING PROGRAM

## 2023-12-15 PROCEDURE — 87591 N.GONORRHOEAE DNA AMP PROB: CPT | Performed by: STUDENT IN AN ORGANIZED HEALTH CARE EDUCATION/TRAINING PROGRAM

## 2023-12-15 PROCEDURE — 99396 PR PREVENTIVE VISIT,EST,40-64: ICD-10-PCS | Mod: S$GLB,,, | Performed by: STUDENT IN AN ORGANIZED HEALTH CARE EDUCATION/TRAINING PROGRAM

## 2023-12-15 PROCEDURE — 1159F PR MEDICATION LIST DOCUMENTED IN MEDICAL RECORD: ICD-10-PCS | Mod: CPTII,S$GLB,, | Performed by: STUDENT IN AN ORGANIZED HEALTH CARE EDUCATION/TRAINING PROGRAM

## 2023-12-15 PROCEDURE — 99396 PREV VISIT EST AGE 40-64: CPT | Mod: S$GLB,,, | Performed by: STUDENT IN AN ORGANIZED HEALTH CARE EDUCATION/TRAINING PROGRAM

## 2023-12-15 RX ORDER — IBUPROFEN 800 MG/1
800 TABLET ORAL 3 TIMES DAILY
Qty: 60 TABLET | Refills: 1 | Status: SHIPPED | OUTPATIENT
Start: 2023-12-15

## 2023-12-16 LAB
C TRACH DNA SPEC QL NAA+PROBE: NOT DETECTED
N GONORRHOEA DNA SPEC QL NAA+PROBE: NOT DETECTED

## 2023-12-19 ENCOUNTER — PATIENT MESSAGE (OUTPATIENT)
Dept: OBSTETRICS AND GYNECOLOGY | Facility: CLINIC | Age: 43
End: 2023-12-19
Payer: COMMERCIAL

## 2024-01-04 LAB
FINAL PATHOLOGIC DIAGNOSIS: NORMAL
Lab: NORMAL

## 2024-01-16 ENCOUNTER — OFFICE VISIT (OUTPATIENT)
Dept: OBSTETRICS AND GYNECOLOGY | Facility: CLINIC | Age: 44
End: 2024-01-16
Payer: COMMERCIAL

## 2024-01-16 DIAGNOSIS — R10.2 FEMALE PELVIC PAIN: ICD-10-CM

## 2024-01-16 DIAGNOSIS — B97.7 HPV IN FEMALE: Primary | ICD-10-CM

## 2024-01-16 PROCEDURE — 99214 OFFICE O/P EST MOD 30 MIN: CPT | Mod: 95,,, | Performed by: STUDENT IN AN ORGANIZED HEALTH CARE EDUCATION/TRAINING PROGRAM

## 2024-01-16 NOTE — PROGRESS NOTES
"The patient location is:  Patient Home   The chief complaint leading to consultation is: HPV  Visit type: Virtual visit with synchronous audio and video  Total time spent with patient: 30 min  Each patient to whom he or she provides medical services by telemedicine is:  (1) informed of the relationship between the physician and patient and the respective role of any other health care provider with respect to management of the patient; and (2) notified that he or she may decline to receive medical services by telemedicine and may withdraw from such care at any time.    Chantel reports for HPV counseling  Pap history is per below:  2019 negative pap with other HR HPV positive   2020 "      "  Colpo MAKI 1  2021 negative pap with HR HPV positive  2022 ASCUS with other HPV positive  2023 negative with other HPV positive    Pt is concerned about long-term health effects of persistent HPV   She has not been vaccinated against HPV  She does desire future childbearing  Lastly, she is concerned about Left lower pelvic pain. Has regular bowels. 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 No bleeding with bowels or urination       The patient is given a full explanation of the ubiquitous nature of HPV. Some research suggests that at least three out of four people who have sex will get a genital HPV infection at some time in their lives. Sexual contact with an infected partner, regardless of the sex of the partner, is the most common way the virus is spread. Most often there are no signs or symptoms of genital HPV infection. Low risk viruses can cause genital warts, but they do not cause abnormal pap smears. High risk viruses can cause abnormal pap smears, but they do not cause genital warts. In most women, the immune system destroys the virus before it causes cancer. But in some women, HPV is not destroyed and can lead to precancer or cancer in those women. There is no "cure" for HPV. Smoking, however, can " make it more difficult for your body to clear the virus.  Recommended the vaccine even after exposure as some research is showing that vaccinated patients (regardless of sexual debut timing compared to vaccine timing) do have less recurrences and clear pre-cancers more commonly  Reviewed the options of monitoring HPV -recurrent annual pap/colpos, LEEP, hyst. Pt is considering LEEP. Recommend colpo first to ensure no higher dysplasia is suspected since it has been three years since last evaluated by colpo  Reviewed LEEP pros/cons, risks to future pregnancies, risks of procedure, expectations intra and post procedure  All questions answered  Colpo and TVUS arranged    Face to Face time with patient: 30    45 minutes of total time spent on the encounter, which includes face to face time and non-face to face time preparing to see the patient (eg, review of tests), Obtaining and/or reviewing separately obtained history, Documenting clinical information in the electronic or other health record, Independently interpreting results (not separately reported) and communicating results to the patient/family/caregiver, or Care coordination (not separately reported).

## 2024-01-18 ENCOUNTER — HOSPITAL ENCOUNTER (OUTPATIENT)
Dept: RADIOLOGY | Facility: OTHER | Age: 44
Discharge: HOME | End: 2024-01-18
Attending: STUDENT IN AN ORGANIZED HEALTH CARE EDUCATION/TRAINING PROGRAM
Payer: COMMERCIAL

## 2024-01-18 DIAGNOSIS — R10.2 FEMALE PELVIC PAIN: ICD-10-CM

## 2024-01-18 PROCEDURE — 76830 TRANSVAGINAL US NON-OB: CPT | Mod: TC

## 2024-01-18 PROCEDURE — 76856 US EXAM PELVIC COMPLETE: CPT | Mod: 26,,, | Performed by: RADIOLOGY

## 2024-01-18 PROCEDURE — 76830 TRANSVAGINAL US NON-OB: CPT | Mod: 26,,, | Performed by: RADIOLOGY

## 2024-01-22 ENCOUNTER — PATIENT MESSAGE (OUTPATIENT)
Dept: OBSTETRICS AND GYNECOLOGY | Facility: CLINIC | Age: 44
End: 2024-01-22
Payer: COMMERCIAL

## 2024-02-02 ENCOUNTER — HOSPITAL ENCOUNTER (OUTPATIENT)
Dept: RADIOLOGY | Facility: HOSPITAL | Age: 44
Discharge: HOME | End: 2024-02-02
Attending: HOSPITALIST
Payer: COMMERCIAL

## 2024-02-02 VITALS — WEIGHT: 180 LBS | BODY MASS INDEX: 30.73 KG/M2 | HEIGHT: 64 IN

## 2024-02-02 DIAGNOSIS — Z12.31 ENCOUNTER FOR SCREENING MAMMOGRAM FOR BREAST CANCER: ICD-10-CM

## 2024-02-02 PROCEDURE — 77067 SCR MAMMO BI INCL CAD: CPT | Mod: TC

## 2024-02-02 PROCEDURE — 77067 SCR MAMMO BI INCL CAD: CPT | Mod: 26,,, | Performed by: RADIOLOGY

## 2024-02-02 PROCEDURE — 77063 BREAST TOMOSYNTHESIS BI: CPT | Mod: 26,,, | Performed by: RADIOLOGY

## 2024-02-21 ENCOUNTER — OFFICE VISIT (OUTPATIENT)
Dept: OBSTETRICS AND GYNECOLOGY | Facility: CLINIC | Age: 44
End: 2024-02-21
Attending: OBSTETRICS & GYNECOLOGY
Payer: COMMERCIAL

## 2024-02-21 ENCOUNTER — LAB VISIT (OUTPATIENT)
Dept: LAB | Facility: OTHER | Age: 44
End: 2024-02-21
Attending: OBSTETRICS & GYNECOLOGY
Payer: COMMERCIAL

## 2024-02-21 VITALS
DIASTOLIC BLOOD PRESSURE: 78 MMHG | SYSTOLIC BLOOD PRESSURE: 116 MMHG | WEIGHT: 182.44 LBS | BODY MASS INDEX: 31.31 KG/M2

## 2024-02-21 DIAGNOSIS — D25.1 FIBROIDS, INTRAMURAL: ICD-10-CM

## 2024-02-21 DIAGNOSIS — N92.0 MENORRHAGIA WITH REGULAR CYCLE: ICD-10-CM

## 2024-02-21 DIAGNOSIS — N85.8 OTHER SPECIFIED NONINFLAMMATORY DISORDERS OF UTERUS: ICD-10-CM

## 2024-02-21 DIAGNOSIS — N92.0 MENORRHAGIA WITH REGULAR CYCLE: Primary | ICD-10-CM

## 2024-02-21 PROCEDURE — 3008F BODY MASS INDEX DOCD: CPT | Mod: CPTII,S$GLB,, | Performed by: OBSTETRICS & GYNECOLOGY

## 2024-02-21 PROCEDURE — 3078F DIAST BP <80 MM HG: CPT | Mod: CPTII,S$GLB,, | Performed by: OBSTETRICS & GYNECOLOGY

## 2024-02-21 PROCEDURE — 82166 ASSAY ANTI-MULLERIAN HORM: CPT | Performed by: OBSTETRICS & GYNECOLOGY

## 2024-02-21 PROCEDURE — 3074F SYST BP LT 130 MM HG: CPT | Mod: CPTII,S$GLB,, | Performed by: OBSTETRICS & GYNECOLOGY

## 2024-02-21 PROCEDURE — 1159F MED LIST DOCD IN RCRD: CPT | Mod: CPTII,S$GLB,, | Performed by: OBSTETRICS & GYNECOLOGY

## 2024-02-21 PROCEDURE — 99999 PR PBB SHADOW E&M-EST. PATIENT-LVL III: CPT | Mod: PBBFAC,,, | Performed by: OBSTETRICS & GYNECOLOGY

## 2024-02-21 PROCEDURE — 99214 OFFICE O/P EST MOD 30 MIN: CPT | Mod: S$GLB,,, | Performed by: OBSTETRICS & GYNECOLOGY

## 2024-02-21 PROCEDURE — 36415 COLL VENOUS BLD VENIPUNCTURE: CPT | Performed by: OBSTETRICS & GYNECOLOGY

## 2024-02-21 NOTE — PROGRESS NOTES
Subjective:       Patient ID: Chantel Alas is a 44 y.o. female.    Chief Complaint:  Consult        History of Present Illness  Chantel Alas is a 44 y.o. female  who presents for a consultation for surgical management for her uterine fibroids. She reports her periods are getting progressively heavier. She has know iron deficiency anemia due to chronic blood loss for which she has received iron infusions recently. A recent u/s shows approximately 3 fibroids that are all designated as submucosal. She does desire future fertility. Her main concern is the location of the fibroids and would that affect the growth of the baby. I recommend MRI to better tell what type of submucosal fibroid this is: FIGO 0, 1, 2 and determine the next step, myomectomy vs continue with pregnancy without surgery. Will also recheck an AMH. Discussed. All questions answered. Pt voiced understanding.     FINDINGS:  Uterus:     Size: 9.3 x 11.3 x 13.1-cm     Masses: 5.9 x 6.0 x 5.6-cm submucosal lower body, 3.8 x 5.0 x 4.0-cm submucosal anterior fundal and 4.7 x 4.3 x 3.6-cm submucosal anterior body uterine leiomyomas.     Endometrium: Normal in this pre-menopausal patient, measuring 10-mm.     Right ovary:     Size: 2.6 x 2.0 x 2.9-cm     Appearance: Normal     Vascular flow: Normal     Left ovary:     Size: 1.9 x 2.5 x 3.5-cm     Appearance: Normal     Vascular Flow: Normal     Free Fluid:     No significant pelvic free fluid appreciated.     Impression:     1. Leiomyomatous uterus with the 3 most prominent fibroids measuring up to 6.0-cm, 5.0-cm and 4.7-cm, as described above.       Patient's last menstrual period was 2024.   Date of Last Pap: 2024    Review of Systems  Review of Systems   Constitutional:  Negative for chills and fever.        Objective:   Physical Exam:   Constitutional: She appears well-developed and well-nourished.                                  Assessment/ Plan:     1. Menorrhagia with regular cycle   ANTIMULLERIAN HORMONE (AMH)      2. Fibroids, intramural        3. Other specified noninflammatory disorders of uterus  MRI Female Pelvis W W/O Contrast          No follow-ups on file.    As of April 1, 2021, the Cures Act has been passed nationally. This new law requires that all doctors progress notes, lab results, pathology reports and radiology reports be released IMMEDIATELY to the patient in the patient portal. That means that the results are released to you at the EXACT same time they are released to me. Therefore, with all of the patients that I have I am not able to reply to each patient exactly when the results come in. So there will be a delay from when you see the results to when I see them and have time to come up with a response to send you. Also I only see these results when I am on the computer at work. So if the results come in over the weekend or after 5 pm of a work day, I will not see them until the next business day. As you can tell, this is a challenge as a physician to give every patient the quick response they hope for and deserve. So please be patient! Thanks for understanding, Dr. Dunne

## 2024-02-22 ENCOUNTER — TELEPHONE (OUTPATIENT)
Dept: OBSTETRICS AND GYNECOLOGY | Facility: CLINIC | Age: 44
End: 2024-02-22
Payer: COMMERCIAL

## 2024-02-23 LAB — MIS SERPL-MCNC: 2.4 NG/ML (ref 0.03–5.5)

## 2024-02-26 ENCOUNTER — HOSPITAL ENCOUNTER (OUTPATIENT)
Dept: RADIOLOGY | Facility: HOSPITAL | Age: 44
Discharge: HOME | End: 2024-02-26
Attending: HOSPITALIST
Payer: COMMERCIAL

## 2024-02-26 DIAGNOSIS — R92.8 ABNORMAL MAMMOGRAM: ICD-10-CM

## 2024-02-26 PROCEDURE — 76642 ULTRASOUND BREAST LIMITED: CPT | Mod: 26,RT,, | Performed by: RADIOLOGY

## 2024-02-26 PROCEDURE — 77065 DX MAMMO INCL CAD UNI: CPT | Mod: 26,RT,, | Performed by: RADIOLOGY

## 2024-02-26 PROCEDURE — 77061 BREAST TOMOSYNTHESIS UNI: CPT | Mod: 26,RT,, | Performed by: RADIOLOGY

## 2024-02-26 PROCEDURE — 76642 ULTRASOUND BREAST LIMITED: CPT | Mod: TC,RT

## 2024-02-26 PROCEDURE — 77061 BREAST TOMOSYNTHESIS UNI: CPT | Mod: TC,RT

## 2024-02-26 PROCEDURE — 77065 DX MAMMO INCL CAD UNI: CPT | Mod: TC,RT

## 2024-03-02 ENCOUNTER — HOSPITAL ENCOUNTER (OUTPATIENT)
Dept: RADIOLOGY | Facility: HOSPITAL | Age: 44
Discharge: HOME | End: 2024-03-02
Attending: OBSTETRICS & GYNECOLOGY
Payer: COMMERCIAL

## 2024-03-02 DIAGNOSIS — N85.8 OTHER SPECIFIED NONINFLAMMATORY DISORDERS OF UTERUS: ICD-10-CM

## 2024-03-02 PROCEDURE — 72197 MRI PELVIS W/O & W/DYE: CPT | Mod: TC

## 2024-03-02 PROCEDURE — 25500020 PHARM REV CODE 255: Performed by: OBSTETRICS & GYNECOLOGY

## 2024-03-02 PROCEDURE — A9585 GADOBUTROL INJECTION: HCPCS | Performed by: OBSTETRICS & GYNECOLOGY

## 2024-03-02 PROCEDURE — 72197 MRI PELVIS W/O & W/DYE: CPT | Mod: 26,,, | Performed by: RADIOLOGY

## 2024-03-02 RX ORDER — GADOBUTROL 604.72 MG/ML
8 INJECTION INTRAVENOUS
Status: COMPLETED | OUTPATIENT
Start: 2024-03-02 | End: 2024-03-02

## 2024-03-02 RX ADMIN — GADOBUTROL 8 ML: 604.72 INJECTION INTRAVENOUS at 08:03

## 2024-03-11 ENCOUNTER — TELEPHONE (OUTPATIENT)
Dept: OBSTETRICS AND GYNECOLOGY | Facility: CLINIC | Age: 44
End: 2024-03-11
Payer: COMMERCIAL

## 2024-03-11 NOTE — TELEPHONE ENCOUNTER
I called pt. Explained MRI shows that fibroids are NOT submucosal. They are subserosal. I explained if she wants to have a baby, she can do that without removing the fibroids first. They will grow with pregnancy but I do not think it is necessary to remove before. Then if she wants to have them removed I think she would be a good candidate for a robotic myomectomy. She will keep me posted.

## 2024-03-30 ENCOUNTER — OFFICE VISIT (OUTPATIENT)
Dept: INTERNAL MEDICINE | Facility: CLINIC | Age: 44
End: 2024-03-30
Payer: COMMERCIAL

## 2024-03-30 VITALS
DIASTOLIC BLOOD PRESSURE: 90 MMHG | SYSTOLIC BLOOD PRESSURE: 112 MMHG | OXYGEN SATURATION: 100 % | TEMPERATURE: 99 F | BODY MASS INDEX: 31.99 KG/M2 | WEIGHT: 187.38 LBS | HEIGHT: 64 IN | HEART RATE: 100 BPM

## 2024-03-30 DIAGNOSIS — J02.0 STREP THROAT: Primary | ICD-10-CM

## 2024-03-30 PROCEDURE — 3080F DIAST BP >= 90 MM HG: CPT | Mod: CPTII,S$GLB,, | Performed by: INTERNAL MEDICINE

## 2024-03-30 PROCEDURE — 3074F SYST BP LT 130 MM HG: CPT | Mod: CPTII,S$GLB,, | Performed by: INTERNAL MEDICINE

## 2024-03-30 PROCEDURE — 3008F BODY MASS INDEX DOCD: CPT | Mod: CPTII,S$GLB,, | Performed by: INTERNAL MEDICINE

## 2024-03-30 PROCEDURE — 1160F RVW MEDS BY RX/DR IN RCRD: CPT | Mod: CPTII,S$GLB,, | Performed by: INTERNAL MEDICINE

## 2024-03-30 PROCEDURE — 99999 PR PBB SHADOW E&M-EST. PATIENT-LVL IV: CPT | Mod: PBBFAC,,, | Performed by: INTERNAL MEDICINE

## 2024-03-30 PROCEDURE — 1159F MED LIST DOCD IN RCRD: CPT | Mod: CPTII,S$GLB,, | Performed by: INTERNAL MEDICINE

## 2024-03-30 PROCEDURE — 99213 OFFICE O/P EST LOW 20 MIN: CPT | Mod: S$GLB,,, | Performed by: INTERNAL MEDICINE

## 2024-03-30 RX ORDER — AMOXICILLIN 500 MG/1
500 TABLET, FILM COATED ORAL EVERY 12 HOURS
Qty: 14 TABLET | Refills: 0 | Status: SHIPPED | OUTPATIENT
Start: 2024-03-30 | End: 2024-04-06

## 2024-03-30 NOTE — PROGRESS NOTES
Subjective:       Patient ID: Chantel Alas is a 44 y.o. female.    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  Now it hurts to swallow, and she has a painful lump in the left side of her neck.  She hasn't had a fever, but has felt warm.  She hasn't had this in many years.    Pmh/meds:  Reviewed and reconciled in EPIC with patient during visit today.    Review of Systems   Constitutional:  Negative for fever.   HENT:  Positive for sore throat, trouble swallowing and voice change. Negative for congestion.    Respiratory:  Negative for cough.    Cardiovascular:  Negative for chest pain.   Gastrointestinal:  Negative for abdominal pain.   Genitourinary:  Negative for difficulty urinating.   Musculoskeletal:  Negative for arthralgias.   Skin:  Negative for rash.   Neurological:  Negative for headaches.   Psychiatric/Behavioral:  Negative for sleep disturbance.        Objective:      Physical Exam  Vitals reviewed.   Constitutional:       Appearance: She is well-developed.   HENT:      Head: Normocephalic and atraumatic.      Mouth/Throat:      Mouth: Mucous membranes are moist.      Pharynx: Posterior oropharyngeal erythema present.   Cardiovascular:      Rate and Rhythm: Normal rate and regular rhythm.      Heart sounds: Normal heart sounds. No murmur heard.     No friction rub. No gallop.   Pulmonary:      Effort: Pulmonary effort is normal. No respiratory distress.      Breath sounds: Normal breath sounds. No wheezing or rales.   Chest:      Chest wall: No tenderness.   Lymphadenopathy:      Cervical: Cervical adenopathy present.   Skin:     General: Skin is warm and dry.      Findings: No erythema.   Neurological:      General: No focal deficit present.      Mental Status: She is alert.   Psychiatric:         Mood and  Affect: Mood normal.         Assessment:       1. Strep throat        Plan:       Chantel was seen today for sore throat, headache and diffculty swallowing.    Diagnoses and all orders for this visit:    Strep throat - she meets criteria for strep throat.  New problem.  Start a course of amoxicillin.  Lozenges and gargles for pain relief.  Tylenol if temp rises  -     amoxicillin (AMOXIL) 500 MG Tab; Take 1 tablet (500 mg total) by mouth every 12 (twelve) hours. for 7 days    Rtc prn    ANAMIKA Woodall MD MPH  Staff Internist

## 2024-04-11 ENCOUNTER — OFFICE VISIT (OUTPATIENT)
Dept: OPTOMETRY | Facility: CLINIC | Age: 44
End: 2024-04-11
Payer: COMMERCIAL

## 2024-04-11 DIAGNOSIS — H40.023 OPEN ANGLE WITH BORDERLINE FINDINGS AND HIGH GLAUCOMA RISK IN BOTH EYES: Primary | ICD-10-CM

## 2024-04-11 DIAGNOSIS — H52.203 ASTIGMATISM OF BOTH EYES WITH PRESBYOPIA: ICD-10-CM

## 2024-04-11 DIAGNOSIS — H52.4 ASTIGMATISM OF BOTH EYES WITH PRESBYOPIA: ICD-10-CM

## 2024-04-11 PROCEDURE — 1160F RVW MEDS BY RX/DR IN RCRD: CPT | Mod: CPTII,S$GLB,, | Performed by: OPTOMETRIST

## 2024-04-11 PROCEDURE — 1159F MED LIST DOCD IN RCRD: CPT | Mod: CPTII,S$GLB,, | Performed by: OPTOMETRIST

## 2024-04-11 PROCEDURE — 92015 DETERMINE REFRACTIVE STATE: CPT | Mod: S$GLB,,, | Performed by: OPTOMETRIST

## 2024-04-11 PROCEDURE — 99999 PR PBB SHADOW E&M-EST. PATIENT-LVL III: CPT | Mod: PBBFAC,,, | Performed by: OPTOMETRIST

## 2024-04-11 PROCEDURE — 92020 GONIOSCOPY: CPT | Mod: S$GLB,,, | Performed by: OPTOMETRIST

## 2024-04-11 PROCEDURE — 92014 COMPRE OPH EXAM EST PT 1/>: CPT | Mod: S$GLB,,, | Performed by: OPTOMETRIST

## 2024-04-11 NOTE — PROGRESS NOTES
HPI    43 Y/o female is here for routine eye exam with C/o pt states she is   noticing that it takes her vision a while to adjust when she is not   wearing her Rx glasses pt also states when she has on Rx glasses her   vision is a blurry at a distance. Pt states she does not wear her Rx   glasses everyday all day just occasionally.  Pt denies pain and discomfort   No f/f    Eye med: no gtt   Last edited by Lizz Black MA on 4/11/2024  8:37 AM.            Assessment /Plan     For exam results, see Encounter Report.    Open angle with borderline findings and high glaucoma risk in both eyes  -     Rosa Visual Field - OU - Extended - Both Eyes; Future; Expected date: 04/11/2025  -     Posterior Segment OCT Optic Nerve- Both eyes; Future; Expected date: 04/11/2025    Astigmatism of both eyes with presbyopia      1. Pt wishes new spex  2. glauc susp by CDs--see OCT (RNFL wnl OU).  iop wnl without meds. Last VF wnl OU.  -fam hx thought in past GF had it, but found out that was not true).  Angles open by gonio OU.  Pach: 547/588.  Doubt glauc now--will monitor        PLAN:    rtc 1 yr: HVF 24-2 sf/OCT/full

## 2024-04-19 ENCOUNTER — TELEPHONE (OUTPATIENT)
Dept: INTERNAL MEDICINE | Facility: CLINIC | Age: 44
End: 2024-04-19
Payer: COMMERCIAL

## 2024-04-19 ENCOUNTER — PATIENT MESSAGE (OUTPATIENT)
Dept: INTERNAL MEDICINE | Facility: CLINIC | Age: 44
End: 2024-04-19
Payer: COMMERCIAL

## 2024-05-21 ENCOUNTER — LAB VISIT (OUTPATIENT)
Dept: LAB | Facility: HOSPITAL | Age: 44
End: 2024-05-21
Payer: COMMERCIAL

## 2024-05-21 ENCOUNTER — OFFICE VISIT (OUTPATIENT)
Dept: INTERNAL MEDICINE | Facility: CLINIC | Age: 44
End: 2024-05-21
Payer: COMMERCIAL

## 2024-05-21 VITALS
WEIGHT: 191.13 LBS | SYSTOLIC BLOOD PRESSURE: 106 MMHG | RESPIRATION RATE: 18 BRPM | OXYGEN SATURATION: 98 % | DIASTOLIC BLOOD PRESSURE: 76 MMHG | BODY MASS INDEX: 32.63 KG/M2 | HEIGHT: 64 IN | HEART RATE: 82 BPM | TEMPERATURE: 97 F

## 2024-05-21 DIAGNOSIS — R68.81 EARLY SATIETY: ICD-10-CM

## 2024-05-21 DIAGNOSIS — E66.9 CLASS 1 OBESITY WITHOUT SERIOUS COMORBIDITY WITH BODY MASS INDEX (BMI) OF 32.0 TO 32.9 IN ADULT, UNSPECIFIED OBESITY TYPE: Primary | ICD-10-CM

## 2024-05-21 DIAGNOSIS — E66.9 CLASS 1 OBESITY WITHOUT SERIOUS COMORBIDITY WITH BODY MASS INDEX (BMI) OF 32.0 TO 32.9 IN ADULT, UNSPECIFIED OBESITY TYPE: ICD-10-CM

## 2024-05-21 DIAGNOSIS — Z87.19 H/O GASTROESOPHAGEAL REFLUX (GERD): ICD-10-CM

## 2024-05-21 LAB
25(OH)D3+25(OH)D2 SERPL-MCNC: 16 NG/ML (ref 30–96)
IRON SERPL-MCNC: 27 UG/DL (ref 30–160)
SATURATED IRON: 5 % (ref 20–50)
TOTAL IRON BINDING CAPACITY: 548 UG/DL (ref 250–450)
TRANSFERRIN SERPL-MCNC: 370 MG/DL (ref 200–375)
TSH SERPL DL<=0.005 MIU/L-ACNC: 0.72 UIU/ML (ref 0.4–4)

## 2024-05-21 PROCEDURE — 1160F RVW MEDS BY RX/DR IN RCRD: CPT | Mod: CPTII,S$GLB,, | Performed by: NURSE PRACTITIONER

## 2024-05-21 PROCEDURE — 3074F SYST BP LT 130 MM HG: CPT | Mod: CPTII,S$GLB,, | Performed by: NURSE PRACTITIONER

## 2024-05-21 PROCEDURE — 83540 ASSAY OF IRON: CPT | Performed by: NURSE PRACTITIONER

## 2024-05-21 PROCEDURE — 3008F BODY MASS INDEX DOCD: CPT | Mod: CPTII,S$GLB,, | Performed by: NURSE PRACTITIONER

## 2024-05-21 PROCEDURE — 99999 PR PBB SHADOW E&M-EST. PATIENT-LVL V: CPT | Mod: PBBFAC,,, | Performed by: NURSE PRACTITIONER

## 2024-05-21 PROCEDURE — 82306 VITAMIN D 25 HYDROXY: CPT | Performed by: NURSE PRACTITIONER

## 2024-05-21 PROCEDURE — 1159F MED LIST DOCD IN RCRD: CPT | Mod: CPTII,S$GLB,, | Performed by: NURSE PRACTITIONER

## 2024-05-21 PROCEDURE — 84443 ASSAY THYROID STIM HORMONE: CPT | Performed by: NURSE PRACTITIONER

## 2024-05-21 PROCEDURE — 99213 OFFICE O/P EST LOW 20 MIN: CPT | Mod: S$GLB,,, | Performed by: NURSE PRACTITIONER

## 2024-05-21 PROCEDURE — 3078F DIAST BP <80 MM HG: CPT | Mod: CPTII,S$GLB,, | Performed by: NURSE PRACTITIONER

## 2024-05-21 PROCEDURE — 36415 COLL VENOUS BLD VENIPUNCTURE: CPT | Mod: PO | Performed by: NURSE PRACTITIONER

## 2024-05-21 RX ORDER — TOPIRAMATE 25 MG/1
25 TABLET ORAL DAILY
Qty: 30 TABLET | Refills: 11 | Status: SHIPPED | OUTPATIENT
Start: 2024-05-21 | End: 2024-06-12 | Stop reason: SDUPTHER

## 2024-05-21 NOTE — PROGRESS NOTES
Subjective:       Patient ID: Chantel Alas is a 44 y.o. female.    Chief Complaint: Weight Loss      Patient is a 44 y.o. female who traditionally follows with Doretha Hernandes MD presenting today for:     Obesity  Chantel Alas is a 44 y.o. female who presents for evaluation of obesity. There is no family history of obesity. History of eating disorders: none.     Obesity History  Lowest adult weight: 135-140 lbs      Current Exercise Habits no regular exercise    Current Eating Habits  Irregular. Eats nothing at times and overeats at other times.   Works as a teacher.   Binge behavior?: yes  Eating precipitated by stress? yes     Patient reports symptoms of cravings bloating fatigue and feeling full after eating small amount of food.   Patient notes that she often wants to eat despite locking hunger cues.  Finds bloating is worse with eating bread/pasta. She has not tried calorie counting.  She did try keto diet without relief.     Patient has a history of acid reflux and underwent a procedure in the past that helped alleviate some of her symptoms was over 10 years ago.  Currently she does not take medication acid reflux but instead avoid trigger foods.    Review of patient's allergies indicates:  No Known Allergies    Medication List with Changes/Refills   New Medications    TOPIRAMATE (TOPAMAX) 25 MG TABLET    Take 1 tablet (25 mg total) by mouth once daily.   Current Medications    CLONAZEPAM (KLONOPIN) 0.5 MG TABLET    Take 1 tablet (0.5 mg total) by mouth daily as needed for Anxiety (panic attacks).    ESCITALOPRAM OXALATE (LEXAPRO) 10 MG TABLET    Take 1 tablet (10 mg total) by mouth once daily.    FERROUS SULFATE (FEOSOL) 325 MG (65 MG IRON) TAB TABLET    Take 1 tablet (325 mg total) by mouth daily with breakfast.    HPV VACCINE,9-CLAY (GARDASIL 9, PF,) 0.5 ML SYRG    inject into muscle    IBUPROFEN (ADVIL,MOTRIN) 800 MG TABLET    Take 1 tablet (800 mg total) by mouth 3 (three) times daily.     Medical, social  "and surgical history has been reviewed with the patient.     Review of Systems   Constitutional:  Positive for appetite change and unexpected weight change.   Gastrointestinal:  Positive for abdominal distention. Negative for reflux.      Objective:   /76 (BP Location: Right arm, Patient Position: Sitting, BP Method: Medium (Manual))   Pulse 82   Temp 97.1 °F (36.2 °C) (Temporal)   Resp 18   Ht 5' 4" (1.626 m)   Wt 86.7 kg (191 lb 2.2 oz)   LMP 04/25/2024 (Exact Date)   SpO2 98%   BMI 32.81 kg/m²     BP Readings from Last 10 Encounters:   05/21/24 106/76   03/30/24 (!) 112/90   02/21/24 116/78   12/15/23 112/80   11/13/23 112/70   06/22/23 118/63   06/15/23 114/62   06/14/23 (!) 112/58   05/22/23 108/63   12/19/22 118/64     Wt Readings from Last 3 Encounters:   05/21/24 1448 86.7 kg (191 lb 2.2 oz)   03/30/24 1053 85 kg (187 lb 6.3 oz)   02/21/24 1519 82.7 kg (182 lb 6.9 oz)     Physical Exam  Vitals reviewed.   Constitutional:       Appearance: Normal appearance.   HENT:      Head: Normocephalic and atraumatic.   Pulmonary:      Effort: Pulmonary effort is normal.   Skin:     General: Skin is warm and dry.   Neurological:      Mental Status: She is alert and oriented to person, place, and time.       Assessment and Plan:     1. Class 1 obesity without serious comorbidity with body mass index (BMI) of 32.0 to 32.9 in adult, unspecified obesity type    Chronic, worsening - up 14 lbs in 5 months.   Diet interventions: diet diary and 1800 calorie diet  Exercise intervention:   Informal measures, e.g. taking stairs instead of elevator    - topiramate (TOPAMAX) 25 MG tablet; Take 1 tablet (25 mg total) by mouth once daily.  Dispense: 30 tablet; Refill: 11  - TSH; Future  - IRON AND TIBC; Future  - Vitamin D; Future  - Ambulatory referral/consult to Bariatric/Obesity Medicine; Future    2. Early satiety  3. H/O gastroesophageal reflux (GERD)    - Ambulatory referral/consult to Endo Procedure ; " Future

## 2024-06-12 DIAGNOSIS — E66.9 CLASS 1 OBESITY WITHOUT SERIOUS COMORBIDITY WITH BODY MASS INDEX (BMI) OF 32.0 TO 32.9 IN ADULT, UNSPECIFIED OBESITY TYPE: ICD-10-CM

## 2024-06-13 RX ORDER — TOPIRAMATE 25 MG/1
25 TABLET ORAL DAILY
Qty: 30 TABLET | Refills: 11 | Status: SHIPPED | OUTPATIENT
Start: 2024-06-13 | End: 2025-06-13

## 2024-06-13 NOTE — TELEPHONE ENCOUNTER
Refill Routing Note   Medication(s) are not appropriate for processing by [facility] for the following reason(s):        Outside of protocol    [facility] action(s):  Route               Appointments  past 12m or future 3m with PCP    Date Provider   Last Visit   11/13/2023 Doretha Hernandes MD   Next Visit   8/21/2024 Doretha Hernandes MD   ED visits in past 90 days: 0        Note composed:7:39 AM 06/13/2024

## 2024-07-17 ENCOUNTER — TELEPHONE (OUTPATIENT)
Dept: ENDOSCOPY | Facility: HOSPITAL | Age: 44
End: 2024-07-17

## 2024-07-17 ENCOUNTER — CLINICAL SUPPORT (OUTPATIENT)
Dept: ENDOSCOPY | Facility: HOSPITAL | Age: 44
End: 2024-07-17
Payer: COMMERCIAL

## 2024-07-17 DIAGNOSIS — R68.81 EARLY SATIETY: ICD-10-CM

## 2024-07-17 DIAGNOSIS — Z87.19 H/O GASTROESOPHAGEAL REFLUX (GERD): ICD-10-CM

## 2024-07-17 NOTE — TELEPHONE ENCOUNTER
Spoke to pt to schedule procedure(s) Upper Endoscopy (EGD)       Physician to perform procedure(s) Dr. MARTÍNEZ Anderson  Date of Procedure (s) 10/10/24  Arrival Time 8:45 AM  Time of Procedure(s) 9:45 AM   Location of Procedure(s) [facility] [address] [facility]  Type of Rx Prep sent to patient: N/A  Instructions provided to patient via [facility]    Patient was informed on the following information and verbalized understanding. Screening questionnaire reviewed with patient and complete. If procedure requires anesthesia, a responsible adult needs to be present to accompany the patient home, patient cannot drive after receiving anesthesia. Appointment details are tentative, especially check-in time. Patient will receive a prep-op call 7 days prior to confirm check-in time for procedure. If applicable the patient should contact their pharmacy to verify Rx for procedure prep is ready for pick-up. Patient was advised to call the scheduling department at [phone] if pharmacy states no Rx is available. Patient was advised to call the endoscopy scheduling department if any questions or concerns arise.      [facility] Scheduling Department

## 2024-07-19 ENCOUNTER — TELEPHONE (OUTPATIENT)
Dept: ENDOSCOPY | Facility: HOSPITAL | Age: 44
End: 2024-07-19
Payer: COMMERCIAL

## 2024-07-19 NOTE — TELEPHONE ENCOUNTER
Telephoned pt to inform pt of new arrival time of 12:45pm for EGD on 10/10/24 with no answer.  Voicemail message left with updated arrival time and direct contact number for pt to return call to confirm or reschedule.  Upper Endoscopy (EGD)       Physician to perform procedure(s) Dr. STEF Mahajan  Date of Procedure (s) 10/10/24  Arrival Time 12:45 PM  Time of Procedure(s) 1:00 PM   Location of Procedure(s) [facility]  Type of Rx Prep sent to patient: N/A  Instructions provided to patient via [facility]    Patient was informed on the following information and verbalized understanding. Screening questionnaire reviewed with patient and complete. If procedure requires anesthesia, a responsible adult needs to be present to accompany the patient home, patient cannot drive after receiving anesthesia. Appointment details are tentative, especially check-in time. Patient will receive a prep-op call 7 days prior to confirm check-in time for procedure. If applicable the patient should contact their pharmacy to verify Rx for procedure prep is ready for pick-up. Patient was advised to call the scheduling department at [phone] if pharmacy states no Rx is available. Patient was advised to call the endoscopy scheduling department if any questions or concerns arise.       Endoscopy Scheduling Department

## 2024-07-23 ENCOUNTER — PATIENT MESSAGE (OUTPATIENT)
Dept: INTERNAL MEDICINE | Facility: CLINIC | Age: 44
End: 2024-07-23

## 2024-07-23 ENCOUNTER — TELEPHONE (OUTPATIENT)
Dept: INTERNAL MEDICINE | Facility: CLINIC | Age: 44
End: 2024-07-23
Payer: COMMERCIAL

## 2024-07-23 ENCOUNTER — OFFICE VISIT (OUTPATIENT)
Dept: INTERNAL MEDICINE | Facility: CLINIC | Age: 44
End: 2024-07-23
Payer: COMMERCIAL

## 2024-07-23 ENCOUNTER — LAB VISIT (OUTPATIENT)
Dept: LAB | Facility: HOSPITAL | Age: 44
End: 2024-07-23
Payer: COMMERCIAL

## 2024-07-23 VITALS
TEMPERATURE: 98 F | RESPIRATION RATE: 18 BRPM | DIASTOLIC BLOOD PRESSURE: 70 MMHG | OXYGEN SATURATION: 98 % | WEIGHT: 180.69 LBS | BODY MASS INDEX: 30.85 KG/M2 | HEIGHT: 64 IN | SYSTOLIC BLOOD PRESSURE: 110 MMHG | HEART RATE: 81 BPM

## 2024-07-23 DIAGNOSIS — R53.83 OTHER FATIGUE: ICD-10-CM

## 2024-07-23 DIAGNOSIS — R11.0 NAUSEA: ICD-10-CM

## 2024-07-23 DIAGNOSIS — D50.8 OTHER IRON DEFICIENCY ANEMIA: ICD-10-CM

## 2024-07-23 DIAGNOSIS — R53.1 WEAKNESS: Primary | ICD-10-CM

## 2024-07-23 DIAGNOSIS — E66.9 OBESITY (BMI 30-39.9): ICD-10-CM

## 2024-07-23 DIAGNOSIS — R53.1 WEAKNESS: ICD-10-CM

## 2024-07-23 LAB
ALBUMIN SERPL BCP-MCNC: 4.3 G/DL (ref 3.5–5.2)
ALP SERPL-CCNC: 72 U/L (ref 55–135)
ALT SERPL W/O P-5'-P-CCNC: 8 U/L (ref 10–44)
AMYLASE SERPL-CCNC: 50 U/L (ref 20–110)
ANION GAP SERPL CALC-SCNC: 11 MMOL/L (ref 8–16)
AST SERPL-CCNC: 15 U/L (ref 10–40)
BASOPHILS # BLD AUTO: 0.1 K/UL (ref 0–0.2)
BASOPHILS NFR BLD: 1 % (ref 0–1.9)
BILIRUB SERPL-MCNC: 0.2 MG/DL (ref 0.1–1)
BUN SERPL-MCNC: 12 MG/DL (ref 6–20)
CALCIUM SERPL-MCNC: 9.8 MG/DL (ref 8.7–10.5)
CHLORIDE SERPL-SCNC: 103 MMOL/L (ref 95–110)
CO2 SERPL-SCNC: 23 MMOL/L (ref 23–29)
CREAT SERPL-MCNC: 0.9 MG/DL (ref 0.5–1.4)
DIFFERENTIAL METHOD BLD: ABNORMAL
EOSINOPHIL # BLD AUTO: 0.4 K/UL (ref 0–0.5)
EOSINOPHIL NFR BLD: 3.4 % (ref 0–8)
ERYTHROCYTE [DISTWIDTH] IN BLOOD BY AUTOMATED COUNT: 18.3 % (ref 11.5–14.5)
EST. GFR  (NO RACE VARIABLE): >60 ML/MIN/1.73 M^2
GLUCOSE SERPL-MCNC: 86 MG/DL (ref 70–110)
HCT VFR BLD AUTO: 30.9 % (ref 37–48.5)
HGB BLD-MCNC: 8.8 G/DL (ref 12–16)
IMM GRANULOCYTES # BLD AUTO: 0.03 K/UL (ref 0–0.04)
IMM GRANULOCYTES NFR BLD AUTO: 0.3 % (ref 0–0.5)
IRON SERPL-MCNC: 18 UG/DL (ref 30–160)
LIPASE SERPL-CCNC: 22 U/L (ref 4–60)
LYMPHOCYTES # BLD AUTO: 3.2 K/UL (ref 1–4.8)
LYMPHOCYTES NFR BLD: 31 % (ref 18–48)
MAGNESIUM SERPL-MCNC: 2.2 MG/DL (ref 1.6–2.6)
MCH RBC QN AUTO: 20.4 PG (ref 27–31)
MCHC RBC AUTO-ENTMCNC: 28.5 G/DL (ref 32–36)
MCV RBC AUTO: 72 FL (ref 82–98)
MONOCYTES # BLD AUTO: 0.8 K/UL (ref 0.3–1)
MONOCYTES NFR BLD: 7.3 % (ref 4–15)
NEUTROPHILS # BLD AUTO: 6 K/UL (ref 1.8–7.7)
NEUTROPHILS NFR BLD: 57 % (ref 38–73)
NRBC BLD-RTO: 0 /100 WBC
PLATELET # BLD AUTO: 515 K/UL (ref 150–450)
PMV BLD AUTO: 10.2 FL (ref 9.2–12.9)
POTASSIUM SERPL-SCNC: 3.8 MMOL/L (ref 3.5–5.1)
PROT SERPL-MCNC: 8.1 G/DL (ref 6–8.4)
RBC # BLD AUTO: 4.32 M/UL (ref 4–5.4)
SATURATED IRON: 3 % (ref 20–50)
SODIUM SERPL-SCNC: 137 MMOL/L (ref 136–145)
TOTAL IRON BINDING CAPACITY: 599 UG/DL (ref 250–450)
TRANSFERRIN SERPL-MCNC: 405 MG/DL (ref 200–375)
WBC # BLD AUTO: 10.44 K/UL (ref 3.9–12.7)

## 2024-07-23 PROCEDURE — 84466 ASSAY OF TRANSFERRIN: CPT | Performed by: NURSE PRACTITIONER

## 2024-07-23 PROCEDURE — 1160F RVW MEDS BY RX/DR IN RCRD: CPT | Mod: CPTII,S$GLB,, | Performed by: NURSE PRACTITIONER

## 2024-07-23 PROCEDURE — 99214 OFFICE O/P EST MOD 30 MIN: CPT | Mod: S$GLB,,, | Performed by: NURSE PRACTITIONER

## 2024-07-23 PROCEDURE — 82150 ASSAY OF AMYLASE: CPT | Performed by: NURSE PRACTITIONER

## 2024-07-23 PROCEDURE — 36415 COLL VENOUS BLD VENIPUNCTURE: CPT | Performed by: NURSE PRACTITIONER

## 2024-07-23 PROCEDURE — 84443 ASSAY THYROID STIM HORMONE: CPT | Performed by: NURSE PRACTITIONER

## 2024-07-23 PROCEDURE — 99999 PR PBB SHADOW E&M-EST. PATIENT-LVL IV: CPT | Mod: PBBFAC,,, | Performed by: NURSE PRACTITIONER

## 2024-07-23 PROCEDURE — 83690 ASSAY OF LIPASE: CPT | Performed by: NURSE PRACTITIONER

## 2024-07-23 PROCEDURE — 83735 ASSAY OF MAGNESIUM: CPT | Performed by: NURSE PRACTITIONER

## 2024-07-23 PROCEDURE — 80053 COMPREHEN METABOLIC PANEL: CPT | Performed by: NURSE PRACTITIONER

## 2024-07-23 PROCEDURE — 3078F DIAST BP <80 MM HG: CPT | Mod: CPTII,S$GLB,, | Performed by: NURSE PRACTITIONER

## 2024-07-23 PROCEDURE — 3074F SYST BP LT 130 MM HG: CPT | Mod: CPTII,S$GLB,, | Performed by: NURSE PRACTITIONER

## 2024-07-23 PROCEDURE — 82728 ASSAY OF FERRITIN: CPT | Performed by: NURSE PRACTITIONER

## 2024-07-23 PROCEDURE — 85025 COMPLETE CBC W/AUTO DIFF WBC: CPT | Performed by: NURSE PRACTITIONER

## 2024-07-23 PROCEDURE — 1159F MED LIST DOCD IN RCRD: CPT | Mod: CPTII,S$GLB,, | Performed by: NURSE PRACTITIONER

## 2024-07-23 PROCEDURE — 3008F BODY MASS INDEX DOCD: CPT | Mod: CPTII,S$GLB,, | Performed by: NURSE PRACTITIONER

## 2024-07-23 NOTE — PATIENT INSTRUCTIONS
Check labs today for underlying causes, will message with results    Recommend notifying FARHAD Mcknight and PCP Dr. Hernandes of problems with taking Topiramate and Lexapro together although no known interaction is seen when looking up med profile, May need to consider tapering off from the prescriber who prescribed it    Recommend taking the iron every day as instructed

## 2024-07-23 NOTE — TELEPHONE ENCOUNTER
----- Message from Faith Evangelista sent at 7/23/2024  3:45 PM CDT -----  Contact: [phone]  Pt states she has an appt at 4 and she is running late she states she should be there in about 5 to 10 minutes please advise

## 2024-07-23 NOTE — Clinical Note
Dr. Hernandes, Saw your pt today which turned out to be a very difficult visit, please review my note to see if anything else could have been done differently from what I did and advised pt. This was my first time seeing her  Sonya, This is the pt today we discussed on the phone. Please review my note and plan of care to determine if anything different could have been done.  Hermelinda Bajwa DNP, FNP-C

## 2024-07-23 NOTE — PROGRESS NOTES
"Subjective     Patient ID: Chantel Alas is a 44 y.o. female.    Chief Complaint: Fatigue, Nausea, and Dizziness    Pt of Dr. Hernandes, new to me, here for, "Fatigue, nausea, weakness, complication with medication, possible Hyponatremia".    Pt states she consulted with a doctor through an "e-visit" on yesterday, then stated she called a doctor who is a friend of hers to get advice on some symptoms she has had since Saturday. There is no e-visit in the record here. States on this past Saturday she decided to restart taking her Lexapro daily. Her PCP Dr. Hernandes started her on this back in November per record review due to her anxiety. She admits to me that she has not been taking it consistently, that she only takes it when she has to go back to work. Has not been on it daily, she decided to stop it 2 months ago. Restarted it this past Saturday due to having to return back to work. She states she was already on Topamax from the Bariatric NP she saw 5/21/24, this was Juan Luis, NP  whom she saw for weight loss. I independently reviewed that clinic visit note today and with pt. At that time she was not taking the Lexapro. States that when she restarted the Lexapro this past Saturday, she felt dizzy, nauseated, and confused. She attributed it to restarting the med. States this started Saturday night around 12 that night. Denies any Chest pain or SOB. Just feels very fatigued, weak, has nausea, and tingling. Also reports some body aches. Denies any recent sick contacts. Reports just "feeling bad"    Reviewed previous records. Last annual with PCP was 11- with PCP when this was prescribed. I reviewed this note and with pt. There is a hx of depression with panic attacks on her problem list from her PCP. Also she is on prn Klonopin for panic attacks per her PCP. There is also a hx of iron deficiency anemia. She tells me she is suppose to be taking oral liquid iron but admits not taking as prescribed nor regularly.    She is " concerned about having hyponatremia. She does tolerate oral intake liquids and food. States she read somewhere online that she could have hyponatremia and that someone told her that that may be what it is.    See ROS.    Review of Systems   Constitutional:  Positive for fatigue. Negative for activity change, appetite change, chills, diaphoresis, fever and unexpected weight change.   Respiratory:  Negative for cough, chest tightness and shortness of breath.    Cardiovascular:  Negative for chest pain, palpitations, leg swelling and claudication.   Gastrointestinal:  Positive for nausea. Negative for abdominal pain, blood in stool, change in bowel habit, constipation, diarrhea and vomiting.   Genitourinary:  Negative for dysuria and hematuria.   Musculoskeletal:  Positive for arthralgias and myalgias.   Allergic/Immunologic: Negative for environmental allergies, food allergies and immunocompromised state.   Neurological:  Positive for dizziness and weakness. Negative for vertigo, tremors, seizures, syncope, speech difficulty, light-headedness, numbness, headaches and memory loss.   Hematological:  Negative for adenopathy. Does not bruise/bleed easily.   Psychiatric/Behavioral:  Positive for confusion and dysphoric mood. Negative for sleep disturbance and suicidal ideas. The patient is nervous/anxious.             Review of patient's allergies indicates:  No Known Allergies      Current Outpatient Medications:     clonazePAM (KLONOPIN) 0.5 MG tablet, Take 1 tablet (0.5 mg total) by mouth daily as needed for Anxiety (panic attacks)., Disp: 30 tablet, Rfl: 0    EScitalopram oxalate (LEXAPRO) 10 MG tablet, Take 1 tablet (10 mg total) by mouth once daily., Disp: 30 tablet, Rfl: 11    ferrous sulfate (FEOSOL) 325 mg (65 mg iron) Tab tablet, Take 1 tablet (325 mg total) by mouth daily with breakfast., Disp: , Rfl: 0    ibuprofen (ADVIL,MOTRIN) 800 MG tablet, Take 1 tablet (800 mg total) by mouth 3 (three) times daily., Disp:  60 tablet, Rfl: 1    topiramate (TOPAMAX) 25 MG tablet, Take 1 tablet (25 mg total) by mouth once daily., Disp: 30 tablet, Rfl: 11    Patient Active Problem List   Diagnosis    Iron deficiency anemia    Generalized anxiety disorder with panic attacks    Depression    Class 1 obesity due to excess calories without serious comorbidity with body mass index (BMI) of 32.0 to 32.9 in adult       No past medical history on file.    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    Tobacco Use    Smoking status: Never    Smokeless tobacco: Never   Substance and Sexual Activity    Alcohol use: Not Currently     Alcohol/week: 1.0 standard drink of alcohol     Types: 1 Glasses of wine per week     Comment: socially    Drug use: Never    Sexual activity: Not Currently     Partners: Male     Birth control/protection: Abstinence     Social Determinants of Health     Financial Resource Strain: Medium Risk (11/12/2023)    Overall Financial Resource Strain (CARDIA)     Difficulty of Paying Living Expenses: Somewhat hard   Food Insecurity: No Food Insecurity (11/12/2023)    Hunger Vital Sign     Worried About Running Out of Food in the Last Year: Never true     Ran Out of Food in the Last Year: Never true   Transportation Needs: No Transportation Needs (11/12/2023)    PRAPARE - Transportation     Lack of Transportation (Medical): No     Lack of Transportation (Non-Medical): No   Physical Activity: Insufficiently Active (11/12/2023)    Exercise Vital Sign     Days of Exercise per Week: 2 days     Minutes of Exercise per Session: 60 min   Stress: Stress Concern Present (11/12/2023)    [place] [facility] [employer] [facility] - Occupational Stress Questionnaire     Feeling of Stress : Very much   Housing Stability: Low  "Risk  (11/12/2023)    Housing Stability Vital Sign     Unable to Pay for Housing in the Last Year: No     Number of Places Lived in the Last Year: 2     Unstable Housing in the Last Year: No       Family History   Problem Relation Name Age of Onset    Hypertension Mother Mom     Stroke Mother Mom     Hypertension Father Dad     Cancer Maternal Grandmother M         leukemia    Leukemia Maternal Grandmother M     Cancer Maternal Grandfather MG     Cancer Paternal Grandmother PG     Cancer Paternal Grandfather JG     Glaucoma Maternal Aunt      Breast cancer Neg Hx      Colon cancer Neg Hx      Ovarian cancer Neg Hx         Objective     Vitals:    07/23/24 1610   BP: 110/70   Pulse: 81   Resp: 18   Temp: 97.8 °F (36.6 °C)   SpO2: 98%   Weight: 81.9 kg (180 lb 10.7 oz)   Height: 5' 4" (1.626 m)   PainSc: 0-No pain       Body mass index is 31.01 kg/m².    Physical Exam  Vitals reviewed.   Constitutional:       General: She is not in acute distress.     Appearance: She is obese. She is not ill-appearing, toxic-appearing or diaphoretic.   HENT:      Head: Normocephalic.      Nose: Nose normal.   Eyes:      Conjunctiva/sclera: Conjunctivae normal.   Cardiovascular:      Rate and Rhythm: Normal rate and regular rhythm.      Heart sounds: Normal heart sounds. No murmur heard.     No gallop.   Pulmonary:      Effort: Pulmonary effort is normal.      Breath sounds: Normal breath sounds.   Abdominal:      General: Abdomen is flat. Bowel sounds are normal. There is no distension.      Palpations: Abdomen is soft. There is no mass.      Tenderness: There is no abdominal tenderness. There is no right CVA tenderness, left CVA tenderness, guarding or rebound.      Hernia: No hernia is present.   Musculoskeletal:         General: Normal range of motion.      Cervical back: Normal range of motion and neck supple. No tenderness.   Lymphadenopathy:      Cervical: No cervical adenopathy.   Skin:     General: Skin is warm and dry. "   Neurological:      General: No focal deficit present.      Mental Status: She is alert and oriented to person, place, and time.   Psychiatric:         Attention and Perception: Attention normal.         Mood and Affect: Mood is anxious. Affect is tearful.         Behavior: Behavior is agitated and withdrawn.         Thought Content: Thought content normal. Thought content does not include homicidal or suicidal ideation. Thought content does not include homicidal or suicidal plan.         Cognition and Memory: Cognition normal.         Judgment: Judgment normal.            Assessment and Plan     1. Weakness  -     CBC Auto Differential; Future; Expected date: 07/23/2024  -     Comprehensive Metabolic Panel; Future; Expected date: 07/23/2024  -     TSH; Future; Expected date: 07/23/2024  -     Iron and TIBC; Future; Expected date: 07/23/2024  -     Ferritin; Future; Expected date: 07/23/2024  -     Magnesium; Future; Expected date: 07/23/2024  -     Amylase; Future; Expected date: 07/23/2024  -     Lipase; Future; Expected date: 07/23/2024    2. Other fatigue  -     CBC Auto Differential; Future; Expected date: 07/23/2024  -     Comprehensive Metabolic Panel; Future; Expected date: 07/23/2024  -     TSH; Future; Expected date: 07/23/2024  -     Magnesium; Future; Expected date: 07/23/2024    3. Other iron deficiency anemia  -     Iron and TIBC; Future; Expected date: 07/23/2024  -     Ferritin; Future; Expected date: 07/23/2024    4. Nausea  -     Comprehensive Metabolic Panel; Future; Expected date: 07/23/2024  -     Amylase; Future; Expected date: 07/23/2024  -     Lipase; Future; Expected date: 07/23/2024    5. Obesity (BMI 30-39.9)  6. BMI 31.0-31.9,adult  Followed by Bariatrics, on Topamax    I reviewed the interactions of Lexapro and low Dose Topamax with pt. Per our system no interactions were found. Per [facility], caution is advised when taking the two as can cause CNS depression, alter seizure control, and  some psychomotor impairment. Pt came in with a complaint of weakness, fatigue, dizziness. Does not report SOB or CP. I advised that she should consult with her PCP in terms of her remaining on the Lexapro along with the Topamax. She stated she needs to be on the Lexapro as she has gone back to work and it helps her get through the day. I also advised that she may need to stop the topamax, but to inform the Prescriber with bariatrics who started her on this of what happened this past Saturday to one, see if she still wants her on it given she is deciding to restart her Lexapro that her PCP prescribed for her. Pt does not have a hx of seizure nor has reported seizure activity. She is neurologically intact on exam today, however she does appear to be anxious and tearful.sad today. She Is not expressing any suicidal thoughts. This could be a psychological component which is why I advised pt to inform her PCP which I also will by copying this visit to her for thoughts as getting off and on lexapro could be a contributing factor to her complaint today. She tells me she just got back on it because she had to return to work this week. I think it is ok to stay on it for the anxiety. She may need to consider stopping the Topamax with bariatrics.    I advised pt I would get some lab work today to identify any other causes of her symptoms which could be many. I advised once I get more blood work/testing, I would have a clearer picture from my standpoint if this is something else causing her symptoms. She asked me how fast I would get the results back. She asked if I thought it could be hyponatremia. I advised I won't know that until I get the results back as the labs will check her entire chemistry panel. I told her if could take 1-2 days. She tells me she is just feeling so bad and wanted more prompt answers. I advised pt that if she is feeling that bad and wanted immediate results, she would have to go to the ER as that would  be the only way she would get immediate results as we are just an outpatient internal medicine clinic, not a hospital. Pt became more upset when I told her this stating it would cost her $100 to go to the ER. I advised that as soon as I get the results I would contact her with a plan based on what I find. She did not appear in emergent distress on exam so I did explain to her that if she is avoiding costs that no I did not think she needed to go to the ER based on my assessment, however is she wanted immediate answers and results now, that that would be the only option. Pt did not appear happy with this stating she was hoping for immediate relief of her problem and answers.    I did recommend she restart taking the iron again that she has not been taking because that can cause fatigue and weakness    So plans today and extensively explained to pt is tocheck labs today for underlying causes, will message with results    Recommend notifying FARHAD Mcknight and PCP Dr. Hernandes of problems with taking Topiramate and Lexapro together although no known interaction is seen when looking up med profile on here, however I did explain to pt that does not mean this is not the cause as she just restarted the lexapro Saturday after being off of it 2 months. May need to consider tapering off from the prescriber who prescribed it in Bariatrics.    Recommend taking the iron every day as instructed    After pt visit was over and Ontaria my MA discharged pt and sent her to the lab for the testing I ordered, the MA informed me that the pt was very upset with me, felt like it was a waste of time seeing me, and that she does not want to get a bill. She also expressed other subjective feelings towards my care which were inappropriate and inaccurate as I worked up her complaint based on my scope of practice and what any other provider would have done. I did consult with my lead NP Sonya Ramirez, and will forward my note both to her for  review and her PCP as well, as this was my very first time seeing this pt for an acute complaint. Rita also told me that the pt stated she came to see a black doctor that would listen to her and that she did not get the treatment she felt she deserved. I am a nurse practitioner and again this was my first time seeing this pt and she has an MD PCP that she is already established with who is Dr. Hernandes. Who I will copy as well on this note.        Follow up if symptoms worsen or fail to improve.

## 2024-07-24 ENCOUNTER — PATIENT MESSAGE (OUTPATIENT)
Dept: INTERNAL MEDICINE | Facility: CLINIC | Age: 44
End: 2024-07-24
Payer: COMMERCIAL

## 2024-07-24 DIAGNOSIS — D50.8 OTHER IRON DEFICIENCY ANEMIA: Primary | ICD-10-CM

## 2024-07-24 LAB
FERRITIN SERPL-MCNC: 5 NG/ML (ref 20–300)
TSH SERPL DL<=0.005 MIU/L-ACNC: 1.52 UIU/ML (ref 0.4–4)

## 2024-07-30 ENCOUNTER — OFFICE VISIT (OUTPATIENT)
Dept: HEMATOLOGY/ONCOLOGY | Facility: CLINIC | Age: 44
End: 2024-07-30
Payer: COMMERCIAL

## 2024-07-30 VITALS
DIASTOLIC BLOOD PRESSURE: 60 MMHG | SYSTOLIC BLOOD PRESSURE: 108 MMHG | TEMPERATURE: 99 F | RESPIRATION RATE: 18 BRPM | BODY MASS INDEX: 32.11 KG/M2 | WEIGHT: 188.06 LBS | HEART RATE: 90 BPM | OXYGEN SATURATION: 100 % | HEIGHT: 64 IN

## 2024-07-30 DIAGNOSIS — D50.8 OTHER IRON DEFICIENCY ANEMIA: ICD-10-CM

## 2024-07-30 PROCEDURE — 1159F MED LIST DOCD IN RCRD: CPT | Mod: CPTII,S$GLB,, | Performed by: INTERNAL MEDICINE

## 2024-07-30 PROCEDURE — 99999 PR PBB SHADOW E&M-EST. PATIENT-LVL IV: CPT | Mod: PBBFAC,,, | Performed by: INTERNAL MEDICINE

## 2024-07-30 PROCEDURE — 3008F BODY MASS INDEX DOCD: CPT | Mod: CPTII,S$GLB,, | Performed by: INTERNAL MEDICINE

## 2024-07-30 PROCEDURE — 3074F SYST BP LT 130 MM HG: CPT | Mod: CPTII,S$GLB,, | Performed by: INTERNAL MEDICINE

## 2024-07-30 PROCEDURE — 99214 OFFICE O/P EST MOD 30 MIN: CPT | Mod: S$GLB,,, | Performed by: INTERNAL MEDICINE

## 2024-07-30 PROCEDURE — 3078F DIAST BP <80 MM HG: CPT | Mod: CPTII,S$GLB,, | Performed by: INTERNAL MEDICINE

## 2024-07-30 PROCEDURE — 1160F RVW MEDS BY RX/DR IN RCRD: CPT | Mod: CPTII,S$GLB,, | Performed by: INTERNAL MEDICINE

## 2024-07-30 RX ORDER — HEPARIN 100 UNIT/ML
500 SYRINGE INTRAVENOUS
OUTPATIENT
Start: 2024-08-06

## 2024-07-30 RX ORDER — DIPHENHYDRAMINE HYDROCHLORIDE 50 MG/ML
50 INJECTION INTRAMUSCULAR; INTRAVENOUS ONCE AS NEEDED
OUTPATIENT
Start: 2024-08-06

## 2024-07-30 RX ORDER — EPINEPHRINE 0.3 MG/.3ML
0.3 INJECTION SUBCUTANEOUS ONCE AS NEEDED
OUTPATIENT
Start: 2024-08-06

## 2024-07-30 RX ORDER — SODIUM CHLORIDE 0.9 % (FLUSH) 0.9 %
10 SYRINGE (ML) INJECTION
OUTPATIENT
Start: 2024-08-06

## 2024-07-30 NOTE — PROGRESS NOTES
Subjective:       Patient ID: Chantel Alas is a 44 y.o. female.    Chief Complaint: Anemia    HPI    First visit with me for iron deficiency anemia. Previously seen by Vikki JOHNSON.  Last IV iron infusion about 2 year ago with response. Felt better after therapy and became inconsistent with oral iron replacement.  Now return of fatigue and pica for ice. Ferritin down to 9 and hemoglobin down to 8 grams.  She restarted a daily OTC oral liquid iron with 125mg elemental iron.    Also noted feeling bad since recently taking topomax and lexapro at same time. Looking up drugs the topomax can enhance lexapro effects - may have increased drowsiness and nausea.     Review of Systems   Constitutional:  Positive for activity change and fatigue.   HENT: Negative.     Eyes: Negative.    Respiratory: Negative.     Cardiovascular: Negative.    Gastrointestinal:  Positive for nausea.   Endocrine: Negative.    Genitourinary: Negative.    Musculoskeletal: Negative.    Integumentary:  Negative.   Allergic/Immunologic: Negative for environmental allergies, food allergies and immunocompromised state.   Neurological: Negative.    Hematological:  Negative for adenopathy. Does not bruise/bleed easily.   Psychiatric/Behavioral: Negative.           Objective:      Physical Exam  Vitals and nursing note reviewed.   Constitutional:       Appearance: She is well-developed.   HENT:      Head: Normocephalic and atraumatic.   Eyes:      General: No scleral icterus.     Conjunctiva/sclera: Conjunctivae normal.   Cardiovascular:      Rate and Rhythm: Normal rate.   Pulmonary:      Effort: Pulmonary effort is normal. No respiratory distress.   Abdominal:      General: There is no distension.      Tenderness: There is no abdominal tenderness.   Musculoskeletal:         General: Normal range of motion.      Cervical back: Normal range of motion and neck supple.   Skin:     General: Skin is warm and dry.   Neurological:      Mental Status: She is  alert and oriented to person, place, and time.      Cranial Nerves: No cranial nerve deficit.   Psychiatric:         Behavior: Behavior normal.         Current Outpatient Medications   Medication Sig Dispense Refill    clonazePAM (KLONOPIN) 0.5 MG tablet Take 1 tablet (0.5 mg total) by mouth daily as needed for Anxiety (panic attacks). 30 tablet 0    ferrous sulfate (FEOSOL) 325 mg (65 mg iron) Tab tablet Take 1 tablet (325 mg total) by mouth daily with breakfast.  0    ibuprofen (ADVIL,MOTRIN) 800 MG tablet Take 1 tablet (800 mg total) by mouth 3 (three) times daily. 60 tablet 1    EScitalopram oxalate (LEXAPRO) 10 MG tablet Take 1 tablet (10 mg total) by mouth once daily. (Patient not taking: Reported on 7/30/2024) 30 tablet 11    topiramate (TOPAMAX) 25 MG tablet Take 1 tablet (25 mg total) by mouth once daily. (Patient not taking: Reported on 7/30/2024) 30 tablet 11     No current facility-administered medications for this visit.      Lab Results   Component Value Date    WBC 10.44 07/23/2024    HGB 8.8 (L) 07/23/2024    HCT 30.9 (L) 07/23/2024    MCV 72 (L) 07/23/2024     (H) 07/23/2024        CMP  Sodium   Date Value Ref Range Status   07/23/2024 137 136 - 145 mmol/L Final     Potassium   Date Value Ref Range Status   07/23/2024 3.8 3.5 - 5.1 mmol/L Final     Chloride   Date Value Ref Range Status   07/23/2024 103 95 - 110 mmol/L Final     CO2   Date Value Ref Range Status   07/23/2024 23 23 - 29 mmol/L Final     Glucose   Date Value Ref Range Status   07/23/2024 86 70 - 110 mg/dL Final     BUN   Date Value Ref Range Status   07/23/2024 12 6 - 20 mg/dL Final     Creatinine   Date Value Ref Range Status   07/23/2024 0.9 0.5 - 1.4 mg/dL Final     Calcium   Date Value Ref Range Status   07/23/2024 9.8 8.7 - 10.5 mg/dL Final     Total Protein   Date Value Ref Range Status   07/23/2024 8.1 6.0 - 8.4 g/dL Final     Albumin   Date Value Ref Range Status   07/23/2024 4.3 3.5 - 5.2 g/dL Final     Total Bilirubin    Date Value Ref Range Status   07/23/2024 0.2 0.1 - 1.0 mg/dL Final     Comment:     For infants and newborns, interpretation of results should be based  on gestational age, weight and in agreement with clinical  observations.    Premature Infant recommended reference ranges:  Up to 24 hours.............<8.0 mg/dL  Up to 48 hours............<12.0 mg/dL  3-5 days..................<15.0 mg/dL  6-29 days.................<15.0 mg/dL       Alkaline Phosphatase   Date Value Ref Range Status   07/23/2024 72 55 - 135 U/L Final     AST   Date Value Ref Range Status   07/23/2024 15 10 - 40 U/L Final     ALT   Date Value Ref Range Status   07/23/2024 8 (L) 10 - 44 U/L Final     Anion Gap   Date Value Ref Range Status   07/23/2024 11 8 - 16 mmol/L Final     eGFR if    Date Value Ref Range Status   12/30/2021 >60.0 >60 mL/min/1.73 m^2 Final     eGFR if non    Date Value Ref Range Status   12/30/2021 >60.0 >60 mL/min/1.73 m^2 Final     Comment:     Calculation used to obtain the estimated glomerular filtration  rate (eGFR) is the CKD-EPI equation.             Assessment:       Problem List Items Addressed This Visit          Oncology    Iron deficiency anemia       Plan:       Iron deficiency anemia from chronic blood loss  Continue OTC oral liquid iron  Repeat IV iron administration    Drug interaction between topomax and lexapro  Recommend taking topomax in morning and lexapro in evening      BMT Chart Routing      Follow up with physician . December/January   Follow up with BR[facility]    Provider visit type Benign hem   Infusion scheduling note   IV iron once authorized   Injection scheduling note    Labs CBC, ferritin and iron and TIBC   Scheduling:  Preferred lab:  Lab interval:  labs about 1 week before return visit   Imaging    Pharmacy appointment    Other referrals              A total of 20 minutes was spent in pre-visit chart review, personal interpretation of labs and imaging, and medication  review. Total visit time 30 minutes, >50 % counseling.

## 2024-07-31 DIAGNOSIS — D50.9 IRON DEFICIENCY ANEMIA, UNSPECIFIED IRON DEFICIENCY ANEMIA TYPE: Primary | ICD-10-CM

## 2024-07-31 DIAGNOSIS — D50.8 OTHER IRON DEFICIENCY ANEMIA: ICD-10-CM

## 2024-08-01 RX ORDER — EPINEPHRINE 0.3 MG/.3ML
0.3 INJECTION SUBCUTANEOUS ONCE AS NEEDED
OUTPATIENT
Start: 2024-08-08

## 2024-08-01 RX ORDER — SODIUM CHLORIDE 9 MG/ML
10 INJECTION, SOLUTION INTRAMUSCULAR; INTRAVENOUS; SUBCUTANEOUS
OUTPATIENT
Start: 2024-08-08

## 2024-08-01 RX ORDER — ACETAMINOPHEN 500 MG
1000 TABLET ORAL
OUTPATIENT
Start: 2024-08-08

## 2024-08-01 RX ORDER — HEPARIN 100 UNIT/ML
500 SYRINGE INTRAVENOUS
OUTPATIENT
Start: 2024-08-08

## 2024-08-01 RX ORDER — DIPHENHYDRAMINE HYDROCHLORIDE 50 MG/ML
50 INJECTION INTRAMUSCULAR; INTRAVENOUS ONCE AS NEEDED
OUTPATIENT
Start: 2024-08-08

## 2024-08-18 ENCOUNTER — PATIENT MESSAGE (OUTPATIENT)
Dept: INTERNAL MEDICINE | Facility: CLINIC | Age: 44
End: 2024-08-18
Payer: COMMERCIAL

## 2024-08-21 NOTE — TELEPHONE ENCOUNTER
No care due was identified.  [facility] Embedded Care Due Messages. Reference number: [id number].   8/20/2024 8:25:28 PM CDT

## 2024-08-22 RX ORDER — ESCITALOPRAM OXALATE 10 MG/1
10 TABLET ORAL DAILY
Qty: 30 TABLET | Refills: 11 | Status: SHIPPED | OUTPATIENT
Start: 2024-08-22 | End: 2025-08-22

## 2024-08-23 ENCOUNTER — TELEPHONE (OUTPATIENT)
Dept: BARIATRICS | Facility: CLINIC | Age: 44
End: 2024-08-23
Payer: COMMERCIAL

## 2024-08-26 ENCOUNTER — DOCUMENTATION ONLY (OUTPATIENT)
Dept: HEMATOLOGY/ONCOLOGY | Facility: CLINIC | Age: 44
End: 2024-08-26
Payer: COMMERCIAL

## 2024-08-26 ENCOUNTER — TELEPHONE (OUTPATIENT)
Dept: BARIATRICS | Facility: CLINIC | Age: 44
End: 2024-08-26
Payer: COMMERCIAL

## 2024-08-27 ENCOUNTER — INFUSION (OUTPATIENT)
Dept: INFUSION THERAPY | Facility: HOSPITAL | Age: 44
End: 2024-08-27
Payer: COMMERCIAL

## 2024-08-27 VITALS
SYSTOLIC BLOOD PRESSURE: 116 MMHG | TEMPERATURE: 98 F | RESPIRATION RATE: 16 BRPM | HEART RATE: 76 BPM | DIASTOLIC BLOOD PRESSURE: 61 MMHG | OXYGEN SATURATION: 99 %

## 2024-08-27 DIAGNOSIS — D50.9 IRON DEFICIENCY ANEMIA, UNSPECIFIED IRON DEFICIENCY ANEMIA TYPE: Primary | ICD-10-CM

## 2024-08-27 PROCEDURE — 25000003 PHARM REV CODE 250: Performed by: INTERNAL MEDICINE

## 2024-08-27 PROCEDURE — 63600175 PHARM REV CODE 636 W HCPCS: Performed by: INTERNAL MEDICINE

## 2024-08-27 PROCEDURE — 96367 TX/PROPH/DG ADDL SEQ IV INF: CPT

## 2024-08-27 PROCEDURE — 96376 TX/PRO/DX INJ SAME DRUG ADON: CPT

## 2024-08-27 PROCEDURE — 96365 THER/PROPH/DIAG IV INF INIT: CPT

## 2024-08-27 RX ORDER — ACETAMINOPHEN 500 MG
1000 TABLET ORAL
Status: CANCELLED | OUTPATIENT
Start: 2024-08-27

## 2024-08-27 RX ORDER — SODIUM CHLORIDE 9 MG/ML
10 INJECTION, SOLUTION INTRAMUSCULAR; INTRAVENOUS; SUBCUTANEOUS
Status: DISCONTINUED | OUTPATIENT
Start: 2024-08-27 | End: 2024-08-27 | Stop reason: HOSPADM

## 2024-08-27 RX ORDER — DIPHENHYDRAMINE HYDROCHLORIDE 50 MG/ML
50 INJECTION INTRAMUSCULAR; INTRAVENOUS ONCE AS NEEDED
OUTPATIENT
Start: 2024-08-27

## 2024-08-27 RX ORDER — HEPARIN 100 UNIT/ML
500 SYRINGE INTRAVENOUS
OUTPATIENT
Start: 2024-08-27

## 2024-08-27 RX ORDER — EPINEPHRINE 0.3 MG/.3ML
0.3 INJECTION SUBCUTANEOUS ONCE AS NEEDED
OUTPATIENT
Start: 2024-08-27

## 2024-08-27 RX ORDER — SODIUM CHLORIDE 9 MG/ML
10 INJECTION, SOLUTION INTRAMUSCULAR; INTRAVENOUS; SUBCUTANEOUS
Status: CANCELLED | OUTPATIENT
Start: 2024-08-27

## 2024-08-27 RX ORDER — ACETAMINOPHEN 500 MG
1000 TABLET ORAL
Status: COMPLETED | OUTPATIENT
Start: 2024-08-27 | End: 2024-08-27

## 2024-08-27 RX ADMIN — SODIUM CHLORIDE 25 MG: 9 INJECTION, SOLUTION INTRAVENOUS at 03:08

## 2024-08-27 RX ADMIN — DIPHENHYDRAMINE HYDROCHLORIDE 50 MG: 50 INJECTION, SOLUTION INTRAMUSCULAR; INTRAVENOUS at 03:08

## 2024-08-27 RX ADMIN — ACETAMINOPHEN 1000 MG: 500 TABLET ORAL at 03:08

## 2024-08-27 RX ADMIN — FAMOTIDINE 20 MG: 10 INJECTION INTRAVENOUS at 03:08

## 2024-08-27 RX ADMIN — SODIUM CHLORIDE 975 MG: 9 INJECTION, SOLUTION INTRAVENOUS at 04:08

## 2024-08-27 NOTE — PLAN OF CARE
1754 Patient tolerated Infed with test dose without incident. Vitals stable before, during, and after infusion.  PIV flushed without resistance and positive for blood return before, during and after infusion. Pre-medicated per orders. No signs or symptoms of a reaction in observation period between test dose and full dose. Tolerated remainder dose well. Patient aware of her next appointment date/time, uses [facility] portal. To contact provider with questions or concerns. D/C ambulatory and stable.

## 2024-08-28 ENCOUNTER — PATIENT MESSAGE (OUTPATIENT)
Dept: HEMATOLOGY/ONCOLOGY | Facility: CLINIC | Age: 44
End: 2024-08-28
Payer: COMMERCIAL

## 2024-08-28 NOTE — TELEPHONE ENCOUNTER
Called patient to advise that complaints may not be attributed to recent iron infusion and that if symptoms persists, patient should seek urgent medical care

## 2024-09-01 ENCOUNTER — PATIENT MESSAGE (OUTPATIENT)
Dept: INTERNAL MEDICINE | Facility: CLINIC | Age: 44
End: 2024-09-01
Payer: COMMERCIAL

## 2024-09-03 NOTE — TELEPHONE ENCOUNTER
Tried calling the patient , no answer.  The patient received the iron injection on August 27 and today she still feeling very tired and somewhat weak. The left side of her body is experiencing slight to moderate numbness, weakness, and tingling.     Please advise.

## 2024-09-19 ENCOUNTER — TELEPHONE (OUTPATIENT)
Dept: INTERNAL MEDICINE | Facility: CLINIC | Age: 44
End: 2024-09-19
Payer: COMMERCIAL

## 2024-09-19 ENCOUNTER — PATIENT MESSAGE (OUTPATIENT)
Dept: INTERNAL MEDICINE | Facility: CLINIC | Age: 44
End: 2024-09-19
Payer: COMMERCIAL

## 2024-09-19 NOTE — TELEPHONE ENCOUNTER
The patient's call return.  No answer, message left on her voicemail.  I see that the patient read my [employer] message offering her an appointment.  No response sent from patient accepting or declining the  appointment.

## 2024-09-19 NOTE — TELEPHONE ENCOUNTER
----- Message from Ameya Beltran sent at 9/19/2024 12:56 PM CDT -----  Contact: [phone]  Patient is returning a phone call.  Who left a message for the patient: Alyssa   Does patient know what this is regarding:  appt   Would you like a call back, or a response through your [facility] portal?:   call back   Comments:      Pt missed a call and would like a call back

## 2024-09-25 ENCOUNTER — TELEPHONE (OUTPATIENT)
Dept: GASTROENTEROLOGY | Facility: CLINIC | Age: 44
End: 2024-09-25
Payer: COMMERCIAL

## 2024-09-25 ENCOUNTER — OFFICE VISIT (OUTPATIENT)
Dept: INTERNAL MEDICINE | Facility: CLINIC | Age: 44
End: 2024-09-25
Payer: COMMERCIAL

## 2024-09-25 VITALS
TEMPERATURE: 97 F | WEIGHT: 189.81 LBS | HEART RATE: 77 BPM | HEIGHT: 64 IN | OXYGEN SATURATION: 99 % | DIASTOLIC BLOOD PRESSURE: 80 MMHG | SYSTOLIC BLOOD PRESSURE: 112 MMHG | BODY MASS INDEX: 32.41 KG/M2 | RESPIRATION RATE: 16 BRPM

## 2024-09-25 DIAGNOSIS — K62.5 RECTAL BLEEDING: Primary | ICD-10-CM

## 2024-09-25 PROCEDURE — 99999 PR PBB SHADOW E&M-EST. PATIENT-LVL III: CPT | Mod: PBBFAC,,, | Performed by: NURSE PRACTITIONER

## 2024-09-25 PROCEDURE — 3074F SYST BP LT 130 MM HG: CPT | Mod: CPTII,S$GLB,, | Performed by: NURSE PRACTITIONER

## 2024-09-25 PROCEDURE — 1159F MED LIST DOCD IN RCRD: CPT | Mod: CPTII,S$GLB,, | Performed by: NURSE PRACTITIONER

## 2024-09-25 PROCEDURE — 3079F DIAST BP 80-89 MM HG: CPT | Mod: CPTII,S$GLB,, | Performed by: NURSE PRACTITIONER

## 2024-09-25 PROCEDURE — 99213 OFFICE O/P EST LOW 20 MIN: CPT | Mod: S$GLB,,, | Performed by: NURSE PRACTITIONER

## 2024-09-25 PROCEDURE — 3008F BODY MASS INDEX DOCD: CPT | Mod: CPTII,S$GLB,, | Performed by: NURSE PRACTITIONER

## 2024-09-25 PROCEDURE — 1160F RVW MEDS BY RX/DR IN RCRD: CPT | Mod: CPTII,S$GLB,, | Performed by: NURSE PRACTITIONER

## 2024-09-25 RX ORDER — HYDROCORTISONE 25 MG/G
CREAM TOPICAL 2 TIMES DAILY
Qty: 20 G | Refills: 0 | Status: SHIPPED | OUTPATIENT
Start: 2024-09-25 | End: 2024-09-30

## 2024-09-25 NOTE — PROGRESS NOTES
"Subjective:       Patient ID: Chantel Alas is a 44 y.o. female.    Chief Complaint: Rectal Bleeding    History of Present Illness    CHIEF COMPLAINT:  Ms. Alas presents with concerns of bright red blood in stool.    GASTROINTESTINAL:  She reports experiencing bright red blood on toilet paper during three consecutive bowel movements on separate days. The bleeding has not recurred since the third episode. She denies straining during most of these bowel movements, with only the first instance possibly involving some strain. She reports a mild sensation of "spiciness" during defecation and slight rectal itching, but denies significant rectal pain. She describes the stools as appearing brown and relatively normal. This has only occurred 2-3 times previously in her life, typically as isolated incidents.    MEDICAL HISTORY:  She recently had an iron infusion that resulted in numbness in her arm, which persisted for 3-4 days before resolving spontaneously. She confirms this issue has completely resolved.    SCHEDULED PROCEDURES:  She has an EGD scheduled for next month and is considering having a colonoscopy on the same day. The procedures coincide with her fall break - she is employed as a teacher.          Review of patient's allergies indicates:  No Known Allergies    Medication List with Changes/Refills   New Medications    HYDROCORTISONE 2.5 % CREAM    Apply topically 2 (two) times daily. for 5 days   Current Medications    CLONAZEPAM (KLONOPIN) 0.5 MG TABLET    Take 1 tablet (0.5 mg total) by mouth daily as needed for Anxiety (panic attacks).    ESCITALOPRAM OXALATE (LEXAPRO) 10 MG TABLET    Take 1 tablet (10 mg total) by mouth once daily.    FERROUS SULFATE (FEOSOL) 325 MG (65 MG IRON) TAB TABLET    Take 1 tablet (325 mg total) by mouth daily with breakfast.    IBUPROFEN (ADVIL,MOTRIN) 800 MG TABLET    Take 1 tablet (800 mg total) by mouth 3 (three) times daily.    TOPIRAMATE (TOPAMAX) 25 MG TABLET    Take 1 tablet " "(25 mg total) by mouth once daily.       Review of Systems   Gastrointestinal:  Positive for anal bleeding and rectal pain. Negative for blood in stool.      Objective:   /80 (BP Location: Left arm, Patient Position: Sitting, BP Method: Medium (Manual))   Pulse 77   Temp 97.2 °F (36.2 °C) (Temporal)   Resp 16   Ht 5' 4" (1.626 m)   Wt 86.1 kg (189 lb 13.1 oz)   LMP 09/10/2024   SpO2 99%   BMI 32.58 kg/m²     Physical Exam  Vitals reviewed.   Constitutional:       Appearance: Normal appearance.   HENT:      Head: Normocephalic and atraumatic.   Pulmonary:      Effort: Pulmonary effort is normal.   Genitourinary:     Comments: No external hemorrhoids noted or anal fissures  Skin:     General: Skin is warm and dry.   Neurological:      Mental Status: She is alert and oriented to person, place, and time.       I reviewed and independently interpreted the labs and imaging below:  Last Labs:    Hemoglobin   Date Value Ref Range Status   07/23/2024 8.8 (L) 12.0 - 16.0 g/dL Final   11/13/2023 11.9 (L) 12.0 - 16.0 g/dL Final     Hematocrit   Date Value Ref Range Status   07/23/2024 30.9 (L) 37.0 - 48.5 % Final   11/13/2023 39.5 37.0 - 48.5 % Final       Assessment and Plan:     1. Rectal bleeding    - hydrocortisone 2.5 % cream; Apply topically 2 (two) times daily. for 5 days  Dispense: 20 g; Refill: 0    RECTAL BLEEDING:  - Explained potential causes of bright red rectal bleeding, including anal fissures and hemorrhoids (internal and/or external).  - Discussed that hemorrhoids can have flare-ups with intermittent bleeding.  - Contact office if rectal bleeding recurs.  - Started hydrocortisone cream for external use, apply twice daily for up to 5 days if rectal bleeding recurs.  - Ms. Alas to increase fiber intake in diet and ensure adequate water intake.    COLONOSCOPY:  - Pending addition of colonoscopy to scheduled EGD on 10/10.  - Considered referral to colorectal surgery for anoscopy if unable to schedule " colonoscopy.         This note was generated with the assistance of ambient listening technology. Verbal consent was obtained by the patient and accompanying visitor(s) for the recording of patient appointment to facilitate this note. I attest to having reviewed and edited the generated note for accuracy, though some syntax or spelling errors may persist. Please contact the author of this note for any clarification.

## 2024-09-25 NOTE — TELEPHONE ENCOUNTER
----- Message from Dayami Mcknight NP sent at 9/25/2024  8:28 AM CDT -----  Patient has EGD scheduled for 10/10 and is having some rectal bleeding - could we add a Colonoscopy for same day?

## 2024-09-25 NOTE — Clinical Note
Patient has EGD scheduled for 10/10 and is having some rectal bleeding - could we add a Colonoscopy for same day?

## 2024-09-26 ENCOUNTER — PATIENT MESSAGE (OUTPATIENT)
Dept: INTERNAL MEDICINE | Facility: CLINIC | Age: 44
End: 2024-09-26
Payer: COMMERCIAL

## 2024-10-01 ENCOUNTER — PATIENT MESSAGE (OUTPATIENT)
Dept: INTERNAL MEDICINE | Facility: CLINIC | Age: 44
End: 2024-10-01
Payer: COMMERCIAL

## 2024-10-02 ENCOUNTER — TELEPHONE (OUTPATIENT)
Dept: ENDOSCOPY | Facility: HOSPITAL | Age: 44
End: 2024-10-02
Payer: COMMERCIAL

## 2024-10-02 NOTE — TELEPHONE ENCOUNTER
Contacted Pt for Endoscopy precall to confirm scheduled procedure(s) Upper Endoscopy (EGD) on 10/10/24. Pt did not answer. Left voicemail for pt to call Endoscopy Scheduling to confirm.       .

## 2024-10-04 ENCOUNTER — PATIENT MESSAGE (OUTPATIENT)
Dept: INTERNAL MEDICINE | Facility: CLINIC | Age: 44
End: 2024-10-04
Payer: COMMERCIAL

## 2024-10-07 ENCOUNTER — TELEPHONE (OUTPATIENT)
Dept: ENDOSCOPY | Facility: HOSPITAL | Age: 44
End: 2024-10-07
Payer: COMMERCIAL

## 2024-10-07 ENCOUNTER — ANESTHESIA EVENT (OUTPATIENT)
Dept: ENDOSCOPY | Facility: HOSPITAL | Age: 44
End: 2024-10-07
Payer: COMMERCIAL

## 2024-10-07 NOTE — TELEPHONE ENCOUNTER
Contacted Pt for Endoscopy precall to confirm scheduled procedure(s) Upper Endoscopy (EGD) on 10/10/24. Pt did not answer. Left voicemail for pt to call Endoscopy Scheduling to confirm.

## 2024-10-07 NOTE — ANESTHESIA PREPROCEDURE EVALUATION
10/07/2024  Chantel Alas is a 44 y.o., female.    Procedure: EGD (ESOPHAGOGASTRODUODENOSCOPY) (N/A)         Patient Active Problem List   Diagnosis    Iron deficiency anemia    Generalized anxiety disorder with panic attacks    Depression    Class 1 obesity due to excess calories without serious comorbidity with body mass index (BMI) of 32.0 to 32.9 in adult       No past medical history on file.    ECHO: No results found for this or any previous visit.      There is no height or weight on file to calculate BMI.    Tobacco Use: Low Risk  (9/25/2024)    Patient History     Smoking Tobacco Use: Never     Smokeless Tobacco Use: Never     Passive Exposure: Not on file       Social History     Substance and Sexual Activity   Drug Use Never        Alcohol Use: Not At Risk (11/12/2023)    AUDIT-C     Frequency of Alcohol Consumption: 2-4 times a month     Average Number of Drinks: 1 or 2     Frequency of Binge Drinking: Never       Review of patient's allergies indicates:  No Known Allergies      Airway:  No value filed.    Pre-op Assessment    I have reviewed the Patient Summary Reports.     I have reviewed the Nursing Notes. I have reviewed the NPO Status.   I have reviewed the Medications.     Review of Systems  Anesthesia Hx:  No problems with previous Anesthesia             Denies Family Hx of Anesthesia complications.    Denies Personal Hx of Anesthesia complications.                    Hematology/Oncology:    Oncology Normal    -- Anemia:                                  EENT/Dental:  EENT/Dental Normal           Cardiovascular:  Cardiovascular Normal Exercise tolerance: good                 ECG has been reviewed.                          Pulmonary:  Pulmonary Normal                       Renal/:  Renal/ Normal                 Hepatic/GI:  Hepatic/GI Normal                 Musculoskeletal:  Musculoskeletal  Normal                Neurological:  Neurology Normal                                      Endocrine:  Endocrine Normal            Dermatological:  Skin Normal    Psych:  Psychiatric History anxiety depression              Physical Exam  General: Well nourished, Cooperative, Alert and Oriented    Airway:  Mallampati: II   Mouth Opening: Normal  TM Distance: Normal  Tongue: Normal  Neck ROM: Normal ROM    Chest/Lungs:  Clear to auscultation, Normal Respiratory Rate    Heart:  Rate: Normal  Rhythm: Regular Rhythm        Anesthesia Plan  Type of Anesthesia, risks & benefits discussed:    Anesthesia Type: Gen Natural Airway  Intra-op Monitoring Plan: Standard ASA Monitors  Post Op Pain Control Plan: multimodal analgesia and IV/PO Opioids PRN  Induction:  IV  Informed Consent: Informed consent signed with the Patient and all parties understand the risks and agree with anesthesia plan.  All questions answered. Patient consented to blood products? No  ASA Score: 2  Day of Surgery Review of History & Physical: H&P Update referred to the surgeon/provider.    Ready For Surgery From Anesthesia Perspective.     .

## 2024-10-09 ENCOUNTER — TELEPHONE (OUTPATIENT)
Dept: ENDOSCOPY | Facility: HOSPITAL | Age: 44
End: 2024-10-09
Payer: COMMERCIAL

## 2024-10-09 DIAGNOSIS — K62.5 RECTAL BLEEDING: Primary | ICD-10-CM

## 2024-10-09 RX ORDER — POLYETHYLENE GLYCOL 3350, SODIUM SULFATE, POTASSIUM CHLORIDE, MAGNESIUM SULFATE, AND SODIUM CHLORIDE FOR ORAL SOLUTION 178.7-7.3G
1 KIT ORAL DAILY
Qty: 2 EACH | Refills: 0 | Status: SHIPPED | OUTPATIENT
Start: 2024-10-09 | End: 2024-10-11

## 2024-10-09 NOTE — TELEPHONE ENCOUNTER
Spoke to patient to schedule procedure(s) Colonoscopy       Physician to perform procedure(s) Dr. CRISTHIAN Ryan  Date of Procedure (s) 10/14/2024  Arrival Time 9:45 AM  Time of Procedure(s) 10:45 AM   Location of Procedure(s) [place] [address] Floor  Type of Rx Prep sent to patient: Suflave  Instructions provided to patient via [facility]    Patient was informed on the following information and verbalized understanding. Screening questionnaire reviewed with patient and complete. If procedure requires anesthesia, a responsible adult needs to be present to accompany the patient home, patient cannot drive after receiving anesthesia. Appointment details are tentative, especially check-in time. Patient will receive a prep-op call 7 days prior to confirm check-in time for procedure. If applicable the patient should contact their pharmacy to verify Rx for procedure prep is ready for pick-up. Patient was advised to call the scheduling department at [phone] if pharmacy states no Rx is available. Patient was advised to call the endoscopy scheduling department if any questions or concerns arise.      [facility] Scheduling Department

## 2024-10-09 NOTE — TELEPHONE ENCOUNTER
Called to confirm EGD for 10.10; pt stated colonoscopy was to be added to her EGD; only procedure scheduled is EGD for 10.10; scheduled colonoscopy 10.14 with patient while on the phone; pt verbalized understanding

## 2024-10-10 ENCOUNTER — HOSPITAL ENCOUNTER (OUTPATIENT)
Facility: HOSPITAL | Age: 44
Discharge: HOME | End: 2024-10-10
Attending: STUDENT IN AN ORGANIZED HEALTH CARE EDUCATION/TRAINING PROGRAM | Admitting: STUDENT IN AN ORGANIZED HEALTH CARE EDUCATION/TRAINING PROGRAM
Payer: COMMERCIAL

## 2024-10-10 ENCOUNTER — ANESTHESIA (OUTPATIENT)
Dept: ENDOSCOPY | Facility: HOSPITAL | Age: 44
End: 2024-10-10
Payer: COMMERCIAL

## 2024-10-10 VITALS
DIASTOLIC BLOOD PRESSURE: 65 MMHG | TEMPERATURE: 99 F | HEART RATE: 62 BPM | SYSTOLIC BLOOD PRESSURE: 143 MMHG | OXYGEN SATURATION: 97 % | RESPIRATION RATE: 20 BRPM

## 2024-10-10 DIAGNOSIS — K21.9 GASTROESOPHAGEAL REFLUX DISEASE, UNSPECIFIED WHETHER ESOPHAGITIS PRESENT: Primary | ICD-10-CM

## 2024-10-10 DIAGNOSIS — K21.9 GERD (GASTROESOPHAGEAL REFLUX DISEASE): ICD-10-CM

## 2024-10-10 LAB
B-HCG UR QL: NEGATIVE
CTP QC/QA: YES

## 2024-10-10 PROCEDURE — 43239 EGD BIOPSY SINGLE/MULTIPLE: CPT | Mod: ,,, | Performed by: STUDENT IN AN ORGANIZED HEALTH CARE EDUCATION/TRAINING PROGRAM

## 2024-10-10 PROCEDURE — 63600175 PHARM REV CODE 636 W HCPCS: Performed by: ANESTHESIOLOGY

## 2024-10-10 PROCEDURE — 99900035 HC TECH TIME PER 15 MIN (STAT)

## 2024-10-10 PROCEDURE — 25000003 PHARM REV CODE 250: Performed by: ANESTHESIOLOGY

## 2024-10-10 PROCEDURE — 37000008 HC ANESTHESIA 1ST 15 MINUTES: Performed by: STUDENT IN AN ORGANIZED HEALTH CARE EDUCATION/TRAINING PROGRAM

## 2024-10-10 PROCEDURE — 27201012 HC FORCEPS, HOT/COLD, DISP: Performed by: STUDENT IN AN ORGANIZED HEALTH CARE EDUCATION/TRAINING PROGRAM

## 2024-10-10 PROCEDURE — 37000009 HC ANESTHESIA EA ADD 15 MINS: Performed by: STUDENT IN AN ORGANIZED HEALTH CARE EDUCATION/TRAINING PROGRAM

## 2024-10-10 PROCEDURE — 81025 URINE PREGNANCY TEST: CPT | Performed by: STUDENT IN AN ORGANIZED HEALTH CARE EDUCATION/TRAINING PROGRAM

## 2024-10-10 PROCEDURE — 43239 EGD BIOPSY SINGLE/MULTIPLE: CPT | Performed by: STUDENT IN AN ORGANIZED HEALTH CARE EDUCATION/TRAINING PROGRAM

## 2024-10-10 PROCEDURE — 94761 N-INVAS EAR/PLS OXIMETRY MLT: CPT

## 2024-10-10 PROCEDURE — 88305 TISSUE EXAM BY PATHOLOGIST: CPT | Performed by: PATHOLOGY

## 2024-10-10 RX ORDER — SODIUM CHLORIDE 9 MG/ML
INJECTION, SOLUTION INTRAVENOUS CONTINUOUS
Status: DISCONTINUED | OUTPATIENT
Start: 2024-10-10 | End: 2024-10-10 | Stop reason: HOSPADM

## 2024-10-10 RX ORDER — PROPOFOL 10 MG/ML
VIAL (ML) INTRAVENOUS CONTINUOUS PRN
Status: DISCONTINUED | OUTPATIENT
Start: 2024-10-10 | End: 2024-10-10

## 2024-10-10 RX ORDER — LIDOCAINE HYDROCHLORIDE 20 MG/ML
INJECTION INTRAVENOUS
Status: DISCONTINUED | OUTPATIENT
Start: 2024-10-10 | End: 2024-10-10

## 2024-10-10 RX ORDER — PROPOFOL 10 MG/ML
VIAL (ML) INTRAVENOUS
Status: DISCONTINUED | OUTPATIENT
Start: 2024-10-10 | End: 2024-10-10

## 2024-10-10 RX ADMIN — PROPOFOL 100 MG: 10 INJECTION, EMULSION INTRAVENOUS at 12:10

## 2024-10-10 RX ADMIN — LIDOCAINE HYDROCHLORIDE 100 MG: 20 INJECTION INTRAVENOUS at 12:10

## 2024-10-10 RX ADMIN — PROPOFOL 175 MCG/KG/MIN: 10 INJECTION, EMULSION INTRAVENOUS at 12:10

## 2024-10-10 RX ADMIN — SODIUM CHLORIDE: 0.9 INJECTION, SOLUTION INTRAVENOUS at 12:10

## 2024-10-10 NOTE — ANESTHESIA POSTPROCEDURE EVALUATION
Anesthesia Post Evaluation    Patient: Chantel Alas    Procedure(s) Performed: Procedure(s) (LRB):  EGD (ESOPHAGOGASTRODUODENOSCOPY) (N/A)    Final Anesthesia Type: general      Patient location during evaluation: GI PACU  Patient participation: Yes- Able to Participate  Level of consciousness: awake and alert, oriented and awake  Post-procedure vital signs: reviewed and stable  Pain management: adequate  Airway patency: patent    PONV status at discharge: No PONV  Anesthetic complications: no      Cardiovascular status: stable  Respiratory status: unassisted and room air  Hydration status: euvolemic  Follow-up not needed.              Vitals Value Taken Time   /65 10/10/24 1315   Temp 36.9 °C (98.5 °F) 10/10/24 1240   Pulse 62 10/10/24 1315   Resp 20 10/10/24 1315   SpO2 97 % 10/10/24 1315         Event Time   Out of Recovery 12:55:00         Pain/Richardson Score: Richardson Score: 10 (10/10/2024  1:15 PM)

## 2024-10-10 NOTE — PLAN OF CARE
Pt in preop bay 4, VSS and IV inserted. Pt denies any open wounds on body or the use of any weight loss injections. Pt needs an updated H&P, procedural consents, an admit order and anesthesia consents, otherwise ready to roll.

## 2024-10-10 NOTE — H&P
Short Stay Endoscopy History and Physical    PCP - Doretha Hernandes MD  Referring Physician - Dayami Mcknight NP  [address] [facility] [place] [id number]    Procedure - EGD  ASA - per anesthesia  Mallampati - per anesthesia  History of Anesthesia problems - no  Family history Anesthesia problems -  no   Plan of anesthesia - General    HPI  44 y.o. female  Reason for procedure:  Early satiety [R68.81]  H/O gastroesophageal reflux (GERD) [Z87.19]      ROS:  Constitutional: No fevers, chills, No weight loss  CV: No chest pain  Pulm: No cough, No shortness of breath  GI: see HPI    Medical History:  has no past medical history on file.    Surgical History:  has a past surgical history that includes [place] tooth extraction (2015) and Dilation and curettage of uterus (2011).    Family History: family history includes Cancer in her maternal grandfather, maternal grandmother, paternal grandfather, and paternal grandmother; Glaucoma in her maternal aunt; Hypertension in her father and mother; Leukemia in her maternal grandmother; Stroke in her mother..    Social History:  reports that she has never smoked. She has never used smokeless tobacco. She reports that she does not currently use alcohol after a past usage of about 1.0 standard drink of alcohol per week. She reports that she does not use drugs.    Review of patient's allergies indicates:  No Known Allergies    Medications:   Medications Prior to Admission   Medication Sig Dispense Refill Last Dose/Taking    topiramate (TOPAMAX) 25 MG tablet Take 1 tablet (25 mg total) by mouth once daily. 30 tablet 11 Past Month    clonazePAM (KLONOPIN) 0.5 MG tablet Take 1 tablet (0.5 mg total) by mouth daily as needed for Anxiety (panic attacks). 30 tablet 0 More than a month    EScitalopram oxalate (LEXAPRO) 10 MG tablet Take 1 tablet (10 mg total) by mouth once daily. 30 tablet 11 More than a month    ferrous sulfate (FEOSOL) 325 mg (65 mg iron) Tab tablet Take 1  tablet (325 mg total) by mouth daily with breakfast.  0 More than a month    hydrocortisone 2.5 % cream Apply topically 2 (two) times daily. for 5 days 20 g 0     ibuprofen (ADVIL,MOTRIN) 800 MG tablet Take 1 tablet (800 mg total) by mouth 3 (three) times daily. 60 tablet 1 More than a month    peg 3350-sod sulf,chlr-pot-mag (SUFLAVE) 178.7-7.3-0.5 gram SolR Take 1 Bottle by mouth once daily. for 2 days 2 each 0        Physical Exam:    Vital Signs:   Vitals:    10/10/24 1125   BP: 125/70   Pulse: 73   Resp: 16   Temp: 97.5 °F (36.4 °C)       General Appearance: Well appearing in no acute distress  Abdomen: Soft, non tender, non distended with normal bowel sounds, no masses      Labs:  Lab Results   Component Value Date    WBC 10.44 07/23/2024    HGB 8.8 (L) 07/23/2024    HCT 30.9 (L) 07/23/2024     (H) 07/23/2024    CHOL 152 11/13/2023    TRIG 63 11/13/2023    HDL 47 11/13/2023    ALT 8 (L) 07/23/2024    AST 15 07/23/2024     07/23/2024    K 3.8 07/23/2024     07/23/2024    CREATININE 0.9 07/23/2024    BUN 12 07/23/2024    CO2 23 07/23/2024    TSH 1.517 07/23/2024    HGBA1C 5.4 11/13/2023       I have explained the risks and benefits of this endoscopic procedure to the patient including but not limited to bleeding, inflammation, infection, perforation, and death.      Dima Anderson MD

## 2024-10-10 NOTE — PROVATION PATIENT INSTRUCTIONS
Discharge Summary/Instructions after an Endoscopic Procedure  Patient Name: Chantel Alas  Patient MRN: [id number]  Patient YOB: 1980  Thursday, October 10, 2024  Dima Anderson MD  Dear patient,  As a result of recent federal legislation (The Federal Cures Act), you may   receive lab or pathology results from your procedure in your [facility]   account before your physician is able to contact you. Your physician or   their representative will relay the results to you with their   recommendations at their soonest availability.  Thank you,  RESTRICTIONS:  During your procedure today, you received medications for sedation.  These   medications may affect your judgment, balance and coordination.  Therefore,   for 24 hours, you have the following restrictions:   - DO NOT drive a car, operate machinery, make legal/financial decisions,   sign important papers or drink alcohol.    ACTIVITY:  Today: no heavy lifting, straining or running due to procedural   sedation/anesthesia.  The following day: return to full activity including work.  DIET:  Eat and drink normally unless instructed otherwise.     TREATMENT FOR COMMON SIDE EFFECTS:  - Mild abdominal pain, nausea, belching, bloating or excessive gas:  rest,   eat lightly and use a heating pad.  - Sore Throat: treat with throat lozenges and/or gargle with warm salt   water.  - Because air was used during the procedure, expelling large amounts of air   from your rectum or belching is normal.  - If a bowel prep was taken, you may not have a bowel movement for 1-3 days.    This is normal.  SYMPTOMS TO WATCH FOR AND REPORT TO YOUR PHYSICIAN:  1. Abdominal pain or bloating, other than gas cramps.  2. Chest pain.  3. Back pain.  4. Signs of infection such as: chills or fever occurring within 24 hours   after the procedure.  5. Rectal bleeding, which would show as bright red, maroon, or black stools.   (A tablespoon of blood from the rectum is not serious, especially  if   hemorrhoids are present.)  6. Vomiting.  7. Weakness or dizziness.  GO DIRECTLY TO THE NEAREST EMERGENCY ROOM IF YOU HAVE ANY OF THE FOLLOWING:      Difficulty breathing              Chills and/or fever over 101 F   Persistent vomiting and/or vomiting blood   Severe abdominal pain   Severe chest pain   Black, tarry stools   Bleeding- more than one tablespoon   Any other symptom or condition that you feel may need urgent attention  Your doctor recommends these additional instructions:  If any biopsies were taken, your doctors clinic will contact you in 1 to 2   weeks with any results.  - Discharge patient to home.   - Await pathology results.   - The findings and recommendations were discussed with the patient.   - Patient has a contact number available for emergencies.  The signs and   symptoms of potential delayed complications were discussed with the   patient.  Return to normal activities tomorrow.  Written discharge   instructions were provided to the patient.  For questions, problems or results please call your physician - Dima Anderson MD at Work:  [phone].  [facility], EMERGENCY ROOM PHONE NUMBER: [phone]  IF A COMPLICATION OR EMERGENCY SITUATION ARISES AND YOU ARE UNABLE TO REACH   YOUR PHYSICIAN - GO DIRECTLY TO THE EMERGENCY ROOM.  Dima Anderson MD  10/10/2024 12:35:42 PM  This report has been verified and signed electronically.  Dear patient,  As a result of recent federal legislation (The Federal Cures Act), you may   receive lab or pathology results from your procedure in your [facility]   account before your physician is able to contact you. Your physician or   their representative will relay the results to you with their   recommendations at their soonest availability.  Thank you,  PROVATION

## 2024-10-10 NOTE — PLAN OF CARE
Patient is stable and ready for discharge. Instructions and report  given to patient.  Questions answered. Patient tolerating po liquids with no difficulty. Patient denies pain, nausea and vomiting. Anesthesia consent and surgical consent in chart.

## 2024-10-10 NOTE — TRANSFER OF CARE
Anesthesia Transfer of Care Note    Patient: Chantel Alas    Procedure(s) Performed: Procedure(s) (LRB):  EGD (ESOPHAGOGASTRODUODENOSCOPY) (N/A)    Patient location: PACU    Anesthesia Type: general    Transport from OR: Transported from OR on room air with adequate spontaneous ventilation    Post pain: adequate analgesia    Post assessment: no apparent anesthetic complications    Post vital signs: stable    Level of consciousness: sedated    Nausea/Vomiting: no nausea/vomiting    Complications: none    Transfer of care protocol was followed    Last vitals: Visit Vitals  /70   Pulse 96   Temp 36.9 °C (98.5 °F) (Temporal)   Resp 16   LMP 10/07/2024 (Approximate)   SpO2 100%   Breastfeeding No

## 2024-10-12 ENCOUNTER — TELEPHONE (OUTPATIENT)
Dept: ENDOSCOPY | Facility: HOSPITAL | Age: 44
End: 2024-10-12
Payer: COMMERCIAL

## 2024-10-12 NOTE — TELEPHONE ENCOUNTER
Attempted to contact patient re: message sent about cost of Suflave bowel cleanser. No answer. VM left to call [phone].

## 2024-10-14 LAB
FINAL PATHOLOGIC DIAGNOSIS: NORMAL
GROSS: NORMAL
Lab: NORMAL

## 2024-10-16 ENCOUNTER — PATIENT MESSAGE (OUTPATIENT)
Dept: HEMATOLOGY/ONCOLOGY | Facility: CLINIC | Age: 44
End: 2024-10-16
Payer: COMMERCIAL

## 2024-11-14 ENCOUNTER — OFFICE VISIT (OUTPATIENT)
Dept: INTERNAL MEDICINE | Facility: CLINIC | Age: 44
End: 2024-11-14
Payer: COMMERCIAL

## 2024-11-14 VITALS
DIASTOLIC BLOOD PRESSURE: 80 MMHG | HEART RATE: 75 BPM | BODY MASS INDEX: 31.99 KG/M2 | WEIGHT: 187.38 LBS | RESPIRATION RATE: 14 BRPM | SYSTOLIC BLOOD PRESSURE: 120 MMHG | HEIGHT: 64 IN | TEMPERATURE: 98 F | OXYGEN SATURATION: 99 %

## 2024-11-14 DIAGNOSIS — F41.1 GENERALIZED ANXIETY DISORDER WITH PANIC ATTACKS: ICD-10-CM

## 2024-11-14 DIAGNOSIS — Z12.11 SCREENING FOR COLORECTAL CANCER: ICD-10-CM

## 2024-11-14 DIAGNOSIS — F41.0 GENERALIZED ANXIETY DISORDER WITH PANIC ATTACKS: ICD-10-CM

## 2024-11-14 DIAGNOSIS — E66.811 CLASS 1 OBESITY DUE TO EXCESS CALORIES WITHOUT SERIOUS COMORBIDITY WITH BODY MASS INDEX (BMI) OF 32.0 TO 32.9 IN ADULT: ICD-10-CM

## 2024-11-14 DIAGNOSIS — F33.9 EPISODE OF RECURRENT MAJOR DEPRESSIVE DISORDER, UNSPECIFIED DEPRESSION EPISODE SEVERITY: ICD-10-CM

## 2024-11-14 DIAGNOSIS — Z12.12 SCREENING FOR COLORECTAL CANCER: ICD-10-CM

## 2024-11-14 DIAGNOSIS — Z00.00 ANNUAL PHYSICAL EXAM: Primary | ICD-10-CM

## 2024-11-14 DIAGNOSIS — E66.09 CLASS 1 OBESITY DUE TO EXCESS CALORIES WITHOUT SERIOUS COMORBIDITY WITH BODY MASS INDEX (BMI) OF 32.0 TO 32.9 IN ADULT: ICD-10-CM

## 2024-11-14 DIAGNOSIS — E55.9 VITAMIN D INSUFFICIENCY: ICD-10-CM

## 2024-11-14 PROCEDURE — 3008F BODY MASS INDEX DOCD: CPT | Mod: CPTII,S$GLB,, | Performed by: HOSPITALIST

## 2024-11-14 PROCEDURE — 99999 PR PBB SHADOW E&M-EST. PATIENT-LVL IV: CPT | Mod: PBBFAC,,, | Performed by: HOSPITALIST

## 2024-11-14 PROCEDURE — 1160F RVW MEDS BY RX/DR IN RCRD: CPT | Mod: CPTII,S$GLB,, | Performed by: HOSPITALIST

## 2024-11-14 PROCEDURE — 99396 PREV VISIT EST AGE 40-64: CPT | Mod: S$GLB,,, | Performed by: HOSPITALIST

## 2024-11-14 PROCEDURE — 3074F SYST BP LT 130 MM HG: CPT | Mod: CPTII,S$GLB,, | Performed by: HOSPITALIST

## 2024-11-14 PROCEDURE — 1159F MED LIST DOCD IN RCRD: CPT | Mod: CPTII,S$GLB,, | Performed by: HOSPITALIST

## 2024-11-14 PROCEDURE — 3079F DIAST BP 80-89 MM HG: CPT | Mod: CPTII,S$GLB,, | Performed by: HOSPITALIST

## 2024-11-14 RX ORDER — CLONAZEPAM 0.5 MG/1
0.5 TABLET ORAL DAILY PRN
Qty: 30 TABLET | Refills: 1 | Status: SHIPPED | OUTPATIENT
Start: 2024-11-14 | End: 2025-11-14

## 2024-11-14 RX ORDER — FLUOCINOLONE ACETONIDE 0.11 MG/ML
5 OIL AURICULAR (OTIC) 2 TIMES DAILY
Qty: 20 ML | Refills: 2 | Status: SHIPPED | OUTPATIENT
Start: 2024-11-14

## 2024-11-14 RX ORDER — ESCITALOPRAM OXALATE 10 MG/1
20 TABLET ORAL DAILY
Start: 2024-11-14 | End: 2025-11-14

## 2024-11-14 NOTE — PROGRESS NOTES
Subjective:     @Patient ID: Chantel Alas is a 44 y.o. female.    Chief Complaint: Annual Exam    HPI  44 y.o. female with anxiety, depression, ROSENDO presents here for annual exam. She works as a  for [employer].              History of Present Illness    CHIEF COMPLAINT:  Ms. Alas presents for an annual wellness visit and to discuss ongoing health concerns including mood issues, weight management, and sleep problems.    HPI:  Ms. Alas restarted Lexapro 10mg daily for mood issues 3-4 days ago, considering an increase to 20mg. She has anxiety symptoms, including pain, aching, and numbness/tingling in her arms and legs, particularly after stressful days. Last week, these symptoms occurred almost daily, necessitating rest after work before driving.    Ms. Alas reports sleep issues, waking up at 3 or 4 am without apparent reason, attributing this to anxiety-induced worrying. Her weight has fluctuated between 170-190 lbs over the past year due to stress eating and difficulty controlling cravings, particularly for chips and cookies. She has been attempting to prepare her own meals to manage food intake.    Ms. Alas reports occasional difficulty swallowing, sensation of food not being fully digested and feeling stuck in her throat. This occurred today after consuming a salad and fruit. She has heartburn symptoms, particularly after consuming tomatoes or orange juice. Ms. Alas also reports dry, itchy ears.    A previous endoscopy was performed, but the patient did not receive a clear explanation of the results. She denies current blood in the stool, major headaches, dizziness, other unusual sensations, or suicidal thoughts.    MEDICATIONS:  Ms. Alas is on Lexapro 10 mg daily for mood/anxiety, which she recently restarted 3-4 days ago. She is also taking Topamax 25 mg daily for weight loss recently restarted. Clonazepam is used as needed for emergencies/panic attacks. She is on a liquid iron supplement and  uses hydrocortisone as needed. For sleep, she takes magnesium glycinate 200-400 mg at night.    MEDICAL HISTORY:  Ms. Alas has a history of anxiety/depression and iron deficiency anemia. Ms. Alas has an upcoming appointment with Dr. Wilkins next month for her Pap or pelvic exam.  Ms. Alas reports having heavy menstrual bleeding and has an upcoming appointment to discuss this issue further.    SURGICAL HISTORY:  Ms. Alas underwent an endoscopy 1 month ago, which showed a normal esophagus, stomach, and duodenum. Biopsies revealed minimal inflammation in the stomach and a normal esophagus. She has not completed a colonoscopy.    TEST RESULTS:  Previous tests showed low vitamin D and iron levels, with the latter requiring an iron infusion.    SOCIAL HISTORY:  Ms. Alas is employed.      ROS:  Constitutional: -dizziness, +change in weight  Head: -headaches  ENT: +difficulty swallowing  Gastrointestinal: +heartburn, -blood in stool  Integumentary: +itching  Neurological: +numbness, +tingling  Psychiatric: +sleep difficulty          Lipid disorders/ASCVD risk (ages >/= 45 or >/= 20 if increased risk ): ordered  DM (>45y yearly or if obese, HTN): A1c ordered     Breast Cancer (40-50y discretion of pt, 50-74y every 1-2 years): Mammogram utd   Cervical Cancer (Pap Smear ages 21-65 every 3 years or Pap + HPV q5 years after 30 years of age): Done         Vaccines:   Influenza (yearly)    Tetanus (every 10 yrs - 1st tdap)    utd 2021  Covid19: due for booster        Exercise: none  Diet:  regular    Review of Systems   Constitutional:  Positive for unexpected weight change. Negative for activity change.   HENT:  Negative for hearing loss, rhinorrhea and trouble swallowing.    Eyes:  Positive for visual disturbance. Negative for discharge.   Respiratory:  Negative for chest tightness and wheezing.    Cardiovascular:  Negative for chest pain and palpitations.   Gastrointestinal:  Positive for blood in stool. Negative for  "constipation, diarrhea and vomiting.   Endocrine: Negative for polydipsia and polyuria.   Genitourinary:  Positive for menstrual problem. Negative for difficulty urinating, dysuria and hematuria.   Musculoskeletal:  Negative for arthralgias, joint swelling and neck pain.   Neurological:  Positive for weakness. Negative for headaches.   Psychiatric/Behavioral:  Positive for dysphoric mood. Negative for confusion.      Past medical history, surgical history, and family medical history reviewed and updated as appropriate.    Medications and allergies reviewed.     Objective:     Vitals:    11/14/24 1529   BP: 120/80   BP Location: Right arm   Patient Position: Sitting   Pulse: 75   Resp: 14   Temp: 97.5 °F (36.4 °C)   TempSrc: Temporal   SpO2: 99%   Weight: 85 kg (187 lb 6.3 oz)   Height: 5' 4" (1.626 m)     Body mass index is 32.17 kg/m².  Physical Exam  Vitals reviewed.   Constitutional:       General: She is not in acute distress.     Appearance: She is well-developed.   HENT:      Head: Normocephalic and atraumatic.      Right Ear: Tympanic membrane normal.      Left Ear: Tympanic membrane normal.      Mouth/Throat:      Mouth: Mucous membranes are moist.      Pharynx: No oropharyngeal exudate.   Eyes:      General:         Right eye: No discharge.         Left eye: No discharge.      Conjunctiva/sclera: Conjunctivae normal.   Cardiovascular:      Rate and Rhythm: Normal rate and regular rhythm.      Heart sounds: No murmur heard.     No friction rub.   Pulmonary:      Effort: Pulmonary effort is normal.      Breath sounds: Normal breath sounds.   Abdominal:      General: Bowel sounds are normal. There is no distension.      Palpations: Abdomen is soft.      Tenderness: There is no abdominal tenderness. There is no guarding.   Musculoskeletal:         General: Normal range of motion.      Cervical back: Normal range of motion and neck supple.      Right lower leg: No edema.      Left lower leg: No edema. "   Lymphadenopathy:      Cervical: No cervical adenopathy.   Skin:     General: Skin is warm and dry.      Comments: +ear dermatitis b/l    Neurological:      Mental Status: She is alert and oriented to person, place, and time.   Psychiatric:         Mood and Affect: Mood is anxious.         Behavior: Behavior normal.         Lab Results   Component Value Date    WBC 10.44 07/23/2024    HGB 8.8 (L) 07/23/2024    HCT 30.9 (L) 07/23/2024     (H) 07/23/2024    CHOL 152 11/13/2023    TRIG 63 11/13/2023    HDL 47 11/13/2023    ALT 8 (L) 07/23/2024    AST 15 07/23/2024     07/23/2024    K 3.8 07/23/2024     07/23/2024    CREATININE 0.9 07/23/2024    BUN 12 07/23/2024    CO2 23 07/23/2024    TSH 1.517 07/23/2024    HGBA1C 5.4 11/13/2023       Assessment:     1. Annual physical exam    2. Episode of recurrent major depressive disorder, unspecified depression episode severity    3. Screening for colorectal cancer    4. Generalized anxiety disorder with panic attacks    5. Vitamin D insufficiency    6. Class 1 obesity due to excess calories without serious comorbidity with body mass index (BMI) of 32.0 to 32.9 in adult      Plan:   Chantel was seen today for annual exam.    Diagnoses and all orders for this visit:    Annual physical exam  -     Comprehensive Metabolic Panel; Future  -     CBC Auto Differential; Future  -     Lipid Panel; Future  -     TSH; Future  -     Hemoglobin A1C; Future    Episode of recurrent major depressive disorder, unspecified depression episode severity  -     Comprehensive Metabolic Panel; Future  -     CBC Auto Differential; Future  -     Lipid Panel; Future  -     TSH; Future  -     Hemoglobin A1C; Future    Screening for colorectal cancer  -     Ambulatory referral/consult to Endo Procedure ; Future    Generalized anxiety disorder with panic attacks  -     Comprehensive Metabolic Panel; Future  -     CBC Auto Differential; Future  -     Lipid Panel; Future  -     TSH;  Future  -     Hemoglobin A1C; Future  -     clonazePAM (KLONOPIN) 0.5 MG tablet; Take 1 tablet (0.5 mg total) by mouth daily as needed for Anxiety (panic attacks).    Vitamin D insufficiency  -     Vitamin D; Future    Class 1 obesity due to excess calories without serious comorbidity with body mass index (BMI) of 32.0 to 32.9 in adult    Other orders  -     fluocinolone acetonide oiL 0.01 % Drop; Place 5 drops in ear(s) 2 (two) times a day.  -     EScitalopram oxalate (LEXAPRO) 10 MG tablet; Take 2 tablets (20 mg total) by mouth once daily.      Assessment & Plan    ANXIETY AND DEPRESSION:   Explained that anxiety symptoms like pain, aching, and tingling in arms and legs are likely related to panic attacks.   Informed about the potential benefits of magnesium glycinate for sleep and anxiety.   Increased Lexapro to 20 mg daily (take two 10 mg tablets).   Continued Topamax 25 mg daily; take around lunchtime or early afternoon.   Continued Clonazepam for use as needed during panic attacks; take half a tablet.   Suggested OTC magnesium glycinate 200-400 mg at night for sleep and anxiety.  - recommend pt schedule appt with a therapist     WEIGHT MANAGEMENT AND OBESITY:   Discussed importance of protein-focused diet, fiber intake, and reducing sugar/carb consumption for weight management.   Ms. Alas to increase daily steps and physical activity; find an accountability partner for exercise motivation.   Recommend focusing on protein-rich foods and increasing fiber intake through fruits and vegetables.   Ms. Alas to reduce consumption of sugary and high-carb foods.    EAR DISORDER:   Started steroid ear drops for ear canal irritation; use for up to 10 days.    GASTROESOPHAGEAL REFLUX DISEASE:   Recommend OTC Tums or Prilosec for occasional heartburn/reflux symptoms.    GENERAL HEALTH MANAGEMENT:   Consider taking vitamin D supplements.   Blood work including vitamin D level ordered.   Schedule labs at the  before  leaving.    FOLLOW-UP:   Follow up in 3 months to reassess weight management progress and medication efficacy.   Contact the office if experiencing rectal bleeding before next appointment.       RTC 3 months for f/u of mood/weight       Doretha Hernandes MD  Internal Medicine    11/14/2024    This note was generated with the assistance of ambient listening technology. Verbal consent was obtained by the patient and accompanying visitor(s) for the recording of patient appointment to facilitate this note. I attest to having reviewed and edited the generated note for accuracy, though some syntax or spelling errors may persist. Please contact the author of this note for any clarification.

## 2024-11-16 ENCOUNTER — LAB VISIT (OUTPATIENT)
Dept: LAB | Facility: HOSPITAL | Age: 44
End: 2024-11-16
Attending: HOSPITALIST
Payer: COMMERCIAL

## 2024-11-16 DIAGNOSIS — F41.0 GENERALIZED ANXIETY DISORDER WITH PANIC ATTACKS: ICD-10-CM

## 2024-11-16 DIAGNOSIS — Z00.00 ANNUAL PHYSICAL EXAM: ICD-10-CM

## 2024-11-16 DIAGNOSIS — F33.9 EPISODE OF RECURRENT MAJOR DEPRESSIVE DISORDER, UNSPECIFIED DEPRESSION EPISODE SEVERITY: ICD-10-CM

## 2024-11-16 DIAGNOSIS — F41.1 GENERALIZED ANXIETY DISORDER WITH PANIC ATTACKS: ICD-10-CM

## 2024-11-16 DIAGNOSIS — E55.9 VITAMIN D INSUFFICIENCY: ICD-10-CM

## 2024-11-16 LAB
25(OH)D3+25(OH)D2 SERPL-MCNC: 14 NG/ML (ref 30–96)
ALBUMIN SERPL BCP-MCNC: 4.1 G/DL (ref 3.5–5.2)
ALP SERPL-CCNC: 63 U/L (ref 40–150)
ALT SERPL W/O P-5'-P-CCNC: 10 U/L (ref 10–44)
ANION GAP SERPL CALC-SCNC: 14 MMOL/L (ref 8–16)
AST SERPL-CCNC: 21 U/L (ref 10–40)
BASOPHILS # BLD AUTO: 0.05 K/UL (ref 0–0.2)
BASOPHILS NFR BLD: 0.7 % (ref 0–1.9)
BILIRUB SERPL-MCNC: 0.4 MG/DL (ref 0.1–1)
BUN SERPL-MCNC: 10 MG/DL (ref 6–20)
CALCIUM SERPL-MCNC: 9.5 MG/DL (ref 8.7–10.5)
CHLORIDE SERPL-SCNC: 104 MMOL/L (ref 95–110)
CHOLEST SERPL-MCNC: 163 MG/DL (ref 120–199)
CHOLEST/HDLC SERPL: 3.6 {RATIO} (ref 2–5)
CO2 SERPL-SCNC: 20 MMOL/L (ref 23–29)
CREAT SERPL-MCNC: 0.8 MG/DL (ref 0.5–1.4)
DIFFERENTIAL METHOD BLD: ABNORMAL
EOSINOPHIL # BLD AUTO: 0.3 K/UL (ref 0–0.5)
EOSINOPHIL NFR BLD: 3.9 % (ref 0–8)
ERYTHROCYTE [DISTWIDTH] IN BLOOD BY AUTOMATED COUNT: 19.4 % (ref 11.5–14.5)
EST. GFR  (NO RACE VARIABLE): >60 ML/MIN/1.73 M^2
ESTIMATED AVG GLUCOSE: 100 MG/DL (ref 68–131)
GLUCOSE SERPL-MCNC: 66 MG/DL (ref 70–110)
HBA1C MFR BLD: 5.1 % (ref 4–5.6)
HCT VFR BLD AUTO: 39.1 % (ref 37–48.5)
HDLC SERPL-MCNC: 45 MG/DL (ref 40–75)
HDLC SERPL: 27.6 % (ref 20–50)
HGB BLD-MCNC: 11.7 G/DL (ref 12–16)
IMM GRANULOCYTES # BLD AUTO: 0.01 K/UL (ref 0–0.04)
IMM GRANULOCYTES NFR BLD AUTO: 0.1 % (ref 0–0.5)
LDLC SERPL CALC-MCNC: 104.4 MG/DL (ref 63–159)
LYMPHOCYTES # BLD AUTO: 2.2 K/UL (ref 1–4.8)
LYMPHOCYTES NFR BLD: 30.1 % (ref 18–48)
MCH RBC QN AUTO: 25.5 PG (ref 27–31)
MCHC RBC AUTO-ENTMCNC: 29.9 G/DL (ref 32–36)
MCV RBC AUTO: 85 FL (ref 82–98)
MONOCYTES # BLD AUTO: 0.6 K/UL (ref 0.3–1)
MONOCYTES NFR BLD: 7.7 % (ref 4–15)
NEUTROPHILS # BLD AUTO: 4.2 K/UL (ref 1.8–7.7)
NEUTROPHILS NFR BLD: 57.5 % (ref 38–73)
NONHDLC SERPL-MCNC: 118 MG/DL
NRBC BLD-RTO: 0 /100 WBC
PLATELET # BLD AUTO: 355 K/UL (ref 150–450)
PMV BLD AUTO: 11.1 FL (ref 9.2–12.9)
POTASSIUM SERPL-SCNC: 3.9 MMOL/L (ref 3.5–5.1)
PROT SERPL-MCNC: 7.3 G/DL (ref 6–8.4)
RBC # BLD AUTO: 4.59 M/UL (ref 4–5.4)
SODIUM SERPL-SCNC: 138 MMOL/L (ref 136–145)
TRIGL SERPL-MCNC: 68 MG/DL (ref 30–150)
TSH SERPL DL<=0.005 MIU/L-ACNC: 0.95 UIU/ML (ref 0.4–4)
WBC # BLD AUTO: 7.27 K/UL (ref 3.9–12.7)

## 2024-11-16 PROCEDURE — 84443 ASSAY THYROID STIM HORMONE: CPT | Performed by: HOSPITALIST

## 2024-11-16 PROCEDURE — 85025 COMPLETE CBC W/AUTO DIFF WBC: CPT | Performed by: HOSPITALIST

## 2024-11-16 PROCEDURE — 83036 HEMOGLOBIN GLYCOSYLATED A1C: CPT | Performed by: HOSPITALIST

## 2024-11-16 PROCEDURE — 80061 LIPID PANEL: CPT | Performed by: HOSPITALIST

## 2024-11-16 PROCEDURE — 36415 COLL VENOUS BLD VENIPUNCTURE: CPT | Mod: PO | Performed by: HOSPITALIST

## 2024-11-16 PROCEDURE — 80053 COMPREHEN METABOLIC PANEL: CPT | Performed by: HOSPITALIST

## 2024-11-16 PROCEDURE — 82306 VITAMIN D 25 HYDROXY: CPT | Performed by: HOSPITALIST

## 2024-11-18 ENCOUNTER — TELEPHONE (OUTPATIENT)
Dept: BARIATRICS | Facility: CLINIC | Age: 44
End: 2024-11-18
Payer: COMMERCIAL

## 2024-11-20 ENCOUNTER — TELEPHONE (OUTPATIENT)
Dept: ENDOSCOPY | Facility: HOSPITAL | Age: 44
End: 2024-11-20

## 2024-11-20 ENCOUNTER — TELEPHONE (OUTPATIENT)
Dept: BARIATRICS | Facility: CLINIC | Age: 44
End: 2024-11-20
Payer: COMMERCIAL

## 2024-11-20 ENCOUNTER — CLINICAL SUPPORT (OUTPATIENT)
Dept: ENDOSCOPY | Facility: HOSPITAL | Age: 44
End: 2024-11-20
Attending: HOSPITALIST
Payer: COMMERCIAL

## 2024-11-20 ENCOUNTER — PATIENT MESSAGE (OUTPATIENT)
Dept: ENDOSCOPY | Facility: HOSPITAL | Age: 44
End: 2024-11-20

## 2024-11-20 DIAGNOSIS — Z12.12 SCREENING FOR COLORECTAL CANCER: ICD-10-CM

## 2024-11-20 DIAGNOSIS — Z12.11 SCREENING FOR COLORECTAL CANCER: ICD-10-CM

## 2024-11-20 RX ORDER — SODIUM, POTASSIUM,MAG SULFATES 17.5-3.13G
1 SOLUTION, RECONSTITUTED, ORAL ORAL DAILY
Qty: 1 KIT | Refills: 0 | Status: SHIPPED | OUTPATIENT
Start: 2024-11-20 | End: 2024-11-22

## 2024-11-20 NOTE — TELEPHONE ENCOUNTER
Referral for procedure from PAT appointment      Spoke to patient to schedule procedure(s) Colonoscopy       Physician to perform procedure(s) Dr. STEF Mahajan  Date of Procedure (s) 1/24/25  Arrival Time 7:15 AM  Time of Procedure(s) 8:15 AM   Location of Procedure(s) [place] [address] Floor  Type of Rx Prep sent to patient: Suprep  Instructions provided to patient via [facility]    Patient was informed on the following information and verbalized understanding. Screening questionnaire reviewed with patient and complete. If procedure requires anesthesia, a responsible adult needs to be present to accompany the patient home, patient cannot drive after receiving anesthesia. Appointment details are tentative, especially check-in time. Patient will receive a prep-op call 7 days prior to confirm check-in time for procedure. If applicable the patient should contact their pharmacy to verify Rx for procedure prep is ready for pick-up. Patient was advised to call the scheduling department at [phone] if pharmacy states no Rx is available. Patient was advised to call the endoscopy scheduling department if any questions or concerns arise.      [facility] Scheduling Department

## 2024-11-20 NOTE — TELEPHONE ENCOUNTER
Many of the oral meds can increase anxiety, but they are the ones contraindicated by her eye problems as well, so they would not be an option either way.

## 2024-11-20 NOTE — TELEPHONE ENCOUNTER
----- Message from Yuliet Waddell MD sent at 11/20/2024 12:50 PM CST -----  Please let pt know that I have reviewed her chart. Due to her high risk for glaucoma findings in her eyes, and hx of anxiety, her only medical weight loss option would be a GLP1a medication. She should check to see if these are covered. If they are not covered, they are usually not a great choice as they cost $600-1200/month and designed to be taken for 2 years to be most effective.     Thank you,  Dr. Waddell

## 2024-11-20 NOTE — TELEPHONE ENCOUNTER
----- Message from Mehnaz sent at 11/20/2024  1:48 PM CST -----  Name of Caller:     Nature of Call: Requesting a call back    Best Call Back Number:[phone]     Additional Information:  JEAN CLAUDE CLEARY calling regarding Patient Advice for a call back from Yvonne about the call patient received today. Please call back to assist

## 2024-11-20 NOTE — TELEPHONE ENCOUNTER
Patient has been informed of information sent by . Patient stated she will call back before clinic close.

## 2024-12-20 ENCOUNTER — OFFICE VISIT (OUTPATIENT)
Dept: OBSTETRICS AND GYNECOLOGY | Facility: CLINIC | Age: 44
End: 2024-12-20
Payer: COMMERCIAL

## 2024-12-20 ENCOUNTER — LAB VISIT (OUTPATIENT)
Dept: LAB | Facility: HOSPITAL | Age: 44
End: 2024-12-20
Attending: STUDENT IN AN ORGANIZED HEALTH CARE EDUCATION/TRAINING PROGRAM
Payer: COMMERCIAL

## 2024-12-20 VITALS — WEIGHT: 190.5 LBS | DIASTOLIC BLOOD PRESSURE: 75 MMHG | BODY MASS INDEX: 32.7 KG/M2 | SYSTOLIC BLOOD PRESSURE: 106 MMHG

## 2024-12-20 DIAGNOSIS — B97.7 HPV IN FEMALE: ICD-10-CM

## 2024-12-20 DIAGNOSIS — Z01.419 WELL WOMAN EXAM WITH ROUTINE GYNECOLOGICAL EXAM: ICD-10-CM

## 2024-12-20 DIAGNOSIS — Z01.419 WELL WOMAN EXAM WITH ROUTINE GYNECOLOGICAL EXAM: Primary | ICD-10-CM

## 2024-12-20 DIAGNOSIS — Z12.31 BREAST CANCER SCREENING BY MAMMOGRAM: ICD-10-CM

## 2024-12-20 LAB
HAV IGM SERPL QL IA: NORMAL
HBV CORE IGM SERPL QL IA: NORMAL
HBV SURFACE AG SERPL QL IA: NORMAL
HCV AB SERPL QL IA: NORMAL
HIV 1+2 AB+HIV1 P24 AG SERPL QL IA: NORMAL
TREPONEMA PALLIDUM IGG+IGM AB [PRESENCE] IN SERUM OR PLASMA BY IMMUNOASSAY: NONREACTIVE

## 2024-12-20 PROCEDURE — 80074 ACUTE HEPATITIS PANEL: CPT | Performed by: STUDENT IN AN ORGANIZED HEALTH CARE EDUCATION/TRAINING PROGRAM

## 2024-12-20 PROCEDURE — 86593 SYPHILIS TEST NON-TREP QUANT: CPT | Performed by: STUDENT IN AN ORGANIZED HEALTH CARE EDUCATION/TRAINING PROGRAM

## 2024-12-20 PROCEDURE — 87591 N.GONORRHOEAE DNA AMP PROB: CPT | Performed by: STUDENT IN AN ORGANIZED HEALTH CARE EDUCATION/TRAINING PROGRAM

## 2024-12-20 PROCEDURE — 36415 COLL VENOUS BLD VENIPUNCTURE: CPT | Mod: PO | Performed by: STUDENT IN AN ORGANIZED HEALTH CARE EDUCATION/TRAINING PROGRAM

## 2024-12-20 PROCEDURE — 87389 HIV-1 AG W/HIV-1&-2 AB AG IA: CPT | Performed by: STUDENT IN AN ORGANIZED HEALTH CARE EDUCATION/TRAINING PROGRAM

## 2024-12-20 PROCEDURE — 99999 PR PBB SHADOW E&M-EST. PATIENT-LVL III: CPT | Mod: PBBFAC,,, | Performed by: STUDENT IN AN ORGANIZED HEALTH CARE EDUCATION/TRAINING PROGRAM

## 2024-12-20 NOTE — PROGRESS NOTES
"History & Physical  Gynecology      SUBJECTIVE:     Chief Complaint: Well Woman         History of Present Illness:  Chantel is here for annual exam. No complaints    Menstrual History: menarche age 11, reports periods are regular, every 28 days lasting 5 days.second day is heaviest  LMP:   STD/STI Hx: HPV  Contraception Hx: none  Sexually Active: male one in last year  GYN surgery: none  Family history: Denies any personal or family history of GYN/colon cancers   Social: Wears seatbelts. Exercises some. Feels safe at home.  Last mammo: 2024  HPV vaccine: has had 1 of three shots  Vitamins: yes  Pap history:   negative pap with other HR HPV positive    "                                                              "  Colpo MAKI 1   negative pap with HR HPV positive   ASCUS with other HPV positive   negative with other HPV positive      Review of patient's allergies indicates:  No Known Allergies    History reviewed. No pertinent past medical history.    Past Surgical History:   Procedure Laterality Date    DILATION AND CURETTAGE OF UTERUS      ESOPHAGOGASTRODUODENOSCOPY N/A 10/10/2024    Procedure: EGD (ESOPHAGOGASTRODUODENOSCOPY);  Surgeon: Dima Anderson MD;  Location: [facility];  Service: Endoscopy;  Laterality: N/A;  24-Ref SREENA Mcknight NP, instr portal-DS  24-LVM with new arrival time of 1245pm, updated instr portal-DS  10/2 case moved to Dr. Anderson's schedule due to room closure-lvm for precall-st  10/7 lvm for precall-st  10.9 precall complete; pt schedule    WISDOM TOOTH EXTRACTION       OB History          1    Para   0    Term   0            AB   1    Living             SAB   1    IAB        Ectopic        Multiple        Live Births                   Family History   Problem Relation Name Age of Onset    Hypertension Mother Mom     Stroke Mother Mom     Hypertension Father Dad     Cancer Maternal Grandmother M         leukemia    Leukemia " Maternal Grandmother M     Cancer Maternal Grandfather MG     Cancer Paternal Grandmother PG     Cancer Paternal Grandfather JG     Glaucoma Maternal Aunt      Breast cancer Neg Hx      Colon cancer Neg Hx      Ovarian cancer Neg Hx       Social History     Tobacco Use    Smoking status: Never    Smokeless tobacco: Never   Substance Use Topics    Alcohol use: Not Currently     Alcohol/week: 1.0 standard drink of alcohol     Types: 1 Glasses of wine per week     Comment: socially    Drug use: Never       Current Outpatient Medications   Medication Sig    clonazePAM (KLONOPIN) 0.5 MG tablet Take 1 tablet (0.5 mg total) by mouth daily as needed for Anxiety (panic attacks).    EScitalopram oxalate (LEXAPRO) 10 MG tablet Take 2 tablets (20 mg total) by mouth once daily.    ferrous sulfate (FEOSOL) 325 mg (65 mg iron) Tab tablet Take 1 tablet (325 mg total) by mouth daily with breakfast.    fluocinolone acetonide oiL 0.01 % Drop Place 5 drops in ear(s) 2 (two) times a day.    hydrocortisone 2.5 % cream Apply topically 2 (two) times daily. for 5 days    ibuprofen (ADVIL,MOTRIN) 800 MG tablet Take 1 tablet (800 mg total) by mouth 3 (three) times daily.    topiramate (TOPAMAX) 25 MG tablet Take 1 tablet (25 mg total) by mouth once daily.     No current facility-administered medications for this visit.         Review of Systems:  Review of Systems   Constitutional:  Negative for activity change, appetite change, fever and unexpected weight change.   Respiratory:  Negative for shortness of breath.    Cardiovascular:  Negative for chest pain.   Gastrointestinal:  Negative for abdominal pain, blood in stool, constipation, diarrhea, nausea and vomiting.   Genitourinary:  Positive for menorrhagia. Negative for dysmenorrhea, dysuria, hematuria, menstrual problem, pelvic pain, vaginal bleeding, vaginal discharge, vaginal pain and vaginal odor.   Integumentary:  Negative for breast mass.   Breast: Negative for lump and mass        OBJECTIVE:     Physical Exam:  Physical Exam  Vitals reviewed.   Constitutional:       General: She is not in acute distress.     Appearance: She is well-developed. She is not diaphoretic.   HENT:      Head: Normocephalic and atraumatic.   Eyes:      General: No scleral icterus.        Right eye: No discharge.         Left eye: No discharge.      Conjunctiva/sclera: Conjunctivae normal.   Neck:      Thyroid: No thyromegaly.   Cardiovascular:      Rate and Rhythm: Normal rate.   Pulmonary:      Effort: Pulmonary effort is normal.   Chest:   Breasts:     Breasts are symmetrical.      Right: No inverted nipple, mass, nipple discharge, skin change or tenderness.      Left: No inverted nipple, mass, nipple discharge, skin change or tenderness.   Abdominal:      General: There is no distension.      Palpations: Abdomen is soft.      Tenderness: There is no abdominal tenderness.   Genitourinary:     Labia:         Right: No rash, tenderness, lesion or injury.         Left: No rash, tenderness, lesion or injury.       Vagina: Normal. No signs of injury and foreign body. No vaginal discharge, erythema, tenderness or bleeding.      Cervix: No cervical motion tenderness, discharge or friability.      Uterus: Not deviated, not enlarged, not fixed and not tender.       Adnexa:         Right: No mass, tenderness or fullness.          Left: No mass, tenderness or fullness.     Musculoskeletal:         General: Normal range of motion.      Cervical back: Normal range of motion and neck supple.   Lymphadenopathy:      Cervical: No cervical adenopathy.   Skin:     General: Skin is warm and dry.      Findings: No erythema or rash.   Neurological:      Mental Status: She is alert and oriented to person, place, and time.           ASSESSMENT:       ICD-10-CM ICD-9-CM    1. Well woman exam with routine gynecological exam  Z01.419 V72.31       2. HPV in female  B97.7 079.4                Plan:      WWE  - Mammogram ordered  - GC/CT  collected  - Colpo performed. Reviewed options for monitoring. HPV vax recommended to complete course  - Daily vitamin discussed.  - Consistent condom use is recommended.   - CBE normal. Physical exam normal. VSS.  - RTC for annual or PRN.        Counseling time: 30 minutes    Vero Wilkins

## 2024-12-20 NOTE — PROCEDURES
Colposcopy    Date/Time: 12/20/2024 9:00 AM    Performed by: Vero Wilkins MD  Authorized by: Vero Wilkins MD    Consent obatined:  Prior to procedure the appropriate consent was completed and verified  Timeout:Immediately prior to procedure a time out was called to verify the correct patient, procedure, equipment, support staff and site/side marked as required    Colposcopy Site:  Cervix  Acrowhite Lesion? Yes    Atypical Vessels: No    Transformation Zone Adequate?: No    Biopsy?: Yes         Location:  Cervix ((1 00))  ECC Performed?: Yes    LEEP Performed?: No    Estimated blood loss (cc):  2   Patient tolerated the procedure well with no immediate complications.   Post-operative instructions were provided for the patient.   Patient was discharged and will follow up if any complications occur     Hemostatic after silver nitrate

## 2024-12-20 NOTE — ADDENDUM NOTE
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 He did receive Home exercises and is continuing this. He started to experience side effect symptoms of dizziness/lightheadedness and drowsiness on the cymbalta and discontinued the medication after reaching out to us via [employer]. He also discontinued the Lyrica and feels he has slight pain improvement after this. He has continued to take the tramadol PRN. Pain is unchanged. Change in quality of symptoms:no. Medication side effects:drowsiness and dizziness/lightheadedness. Recent diagnostic testing:none. Recent interventional procedures:none. .        Imaging: New: yes, XR Lumbar     FINDINGS:   Vertebral body height and alignment are unremarkable. There are small   anterior spurs at L2-3. There are small posterior spurs at L4-5. there is no   significant disc space narrowing lumbar region. Impression   Mild degenerative change without acute process           MRI Result (most recent):  No results found for this or any previous visit from the past 3650 days.       CT Result (most recent):  CTA NECK [address] [facility], [address] [place] [address]  [phone]    CT Scan Report  Signed    Patient: Joey Wilson                                                                MR#: [id number]  [id number]  : 1972                                                                [de-identified]    Age/Sex: 52 / M Addended by: JEREMY OMALLEY on: 12/20/2024 10:27 AM     Modules accepted: Orders     Admit Date: 01/25/22    Loc: ER  Attending Dr:    Ordering Physician: Stephanie Reyes MD  Date of Service: 01/25/22  Procedure(s): CT angio head; CT angio neck  Accession Number(s): [id number]; [id number]    cc: Haris Ang MD          PHYSICIAN INDICATIONS:  stroke like sx    TECHNIQUE: High resolution axial CT images of the head, neck, and upper chest with  three-dimensional reconstruction on separate workstation. Study was performed with intravenous  contrast for CT Angiogram.  Measurements were made using NASCET criteria. CT Dose Index: 4.9 -  72.9 mGy. DLP: 750 mGy-cm. Melvin Spear.  k=0.0031 mSv/(mGy-cm)    FINDINGS  Brain: Normal appearance of the ventricles and sulci. No mass lesions, mass effect, or acute  hemorrhage. Right Carotid Artery: No significant plaquing of the right carotid bifurcation. No hemodynamically  significant stenosis right internal carotid artery. Left Carotid Artery: No significant plaquing of the left carotid bifurcation. No hemodynamically  significant stenosis left internal carotid artery. Intracranial Circulation:    There is no significant plaquing of the carotid siphon bilaterally. No hemodynamically significant  stenosis is identified. The right internal carotid artery supplies the right anterior, middle and posterior cerebral artery  circulation. The left internal carotid artery supplies the left anterior, middle and posterior cerebral artery  circulation. The basilar artery supplies the posterior fossa circulation. Brachiocephalic artery, left common carotid artery, and left subclavian arteries are patent at the  aortic arch. IMPRESSION:    CT Angiogram Head and Neck with contrast and high resolution 2-D and 3-D images:    1. Unremarkable brain parenchyma. No evidence of acute infarction. 2.  No significant stenosis internal carotid arteries.     3.  No significant stenosis of the intracranial vessels or evidence of aneurysm. Dictated ByJoslyn Coker MD   DD/DT: 01/25/22 1518  Signed By: Mercedes Peck MD 01/25/22 1518    : [place]      Pertinent Labs:   Lab Results   Component Value Date/Time    BUN 15 09/30/2022 01:25 PM    CREATININE 0.9 09/30/2022 01:25 PM    GLUCOSE 112 09/30/2022 01:25 PM    AST 17 09/30/2022 01:25 PM    ALT 16 09/30/2022 01:25 PM    LABA1C 6.4 09/30/2022 01:25 PM    INR 1.08 01/25/2022 01:32 PM     09/30/2022 01:25 PM       Potential Aberrant Drug-Related Behavior:  no     Urine Drug Screening:    No results found for: Candace Precise, LABBENZ, CANSU, COCAIMETSCRU, OPIATESCREENURINE, PHENCYCLIDINESCREENURINE, LABMETH, OXYCODONEUR, FENTSCRUR, DSCOMMENT    Past Medical History:   Diagnosis Date    Allergic rhinitis     Anxiety     Asthma     Chronic back pain     Depression     Diabetes mellitus ([address]     DM (diabetes mellitus) ([address]     Erectile dysfunction     GERD (gastroesophageal reflux disease)     HTN (hypertension)     Hyperlipidemia     Hypertension     Neuropathy     Obesity     Restless legs syndrome     Sleep apnea     Type 2 diabetes mellitus without complication ([address]        Past Surgical History:   Procedure Laterality Date    ADENOIDECTOMY  1975    TYMPANOSTOMY TUBE PLACEMENT      1977    TYMPANOSTOMY TUBE PLACEMENT Bilateral 1977       Prior to Admission medications    Medication Sig Start Date End Date Taking? Authorizing Provider   cyclobenzaprine (FLEXERIL) 5 MG tablet Take 1 tablet by mouth 2 times daily as needed for Muscle spasms 11/16/22 1/15/23 Yes Mary Ellen Mack MD   blood glucose monitor strips 1 strip by Other route 3 times daily And as needed for symptoms of irregular blood glucose. Free Style Brand.  10/11/22 4/9/23 Yes TIMO Bay CNP   Lancets MISC Check FBS daily and PRN 10/11/22  Yes TIMO Min CNP   traMADol (ULTRAM) 50 MG tablet Take 1 tablet by mouth every 6 hours as needed for Pain for up to 30 days.  10/11/22 11/16/22 Yes TIMO Sharma CNP   FARXIGA 10 MG tablet Take 1 tablet by mouth daily 9/5/22  Yes Historical Provider, MD   glimepiride (AMARYL) 4 MG tablet Take 1 tablet by mouth daily 8/22/22  Yes Historical Provider, MD   TRULICITY 1.5 XL/8.1FH SOPN INJECT 0.5ML EVERY WEEK SUBCUTANEOUSLY AS DIRECTED 9/3/22  Yes Historical Provider, MD   Alcohol Swabs (B-D SINGLE USE SWABS REGULAR) PADS USE 4 TIMES A DAY 9/17/22  Yes Historical Provider, MD   Continuous Blood Gluc Sensor (FREESTYLE ELIJAH 14 DAY SENSOR) MISC CHANGE SENSOR EVERY 14 DAYS 9/10/22  Yes Historical Provider, MD   lisinopril (PRINIVIL;ZESTRIL) 10 MG tablet Take 1 tablet by mouth daily 9/25/22  Yes TIMO Sharma CNP   cetirizine (ZYRTEC) 10 MG tablet TAKE ONE TABLET BY MOUTH ONCE DAILY 8/26/22  Yes TIMO Sharma CNP   furosemide (LASIX) 40 MG tablet TAKE ONE TABLET BY MOUTH ONCE DAILY 8/26/22  Yes TIMO Sharma CNP   ASPIRIN LOW DOSE 81 MG chewable tablet CHEW AND SWALLOW ONE TABLET BY MOUTH EVERY DAY 7/27/22  Yes TIMO Sharma CNP   atorvastatin (LIPITOR) 20 MG tablet TAKE ONE TABLET BY MOUTH DAILY 6/29/22  Yes TIMO Sharma CNP   sertraline (ZOLOFT) 100 MG tablet Take 1 tablet by mouth daily 5/6/22  Yes Historical Provider, MD   omeprazole (PRILOSEC) 40 MG delayed release capsule take 1 capsule by mouth every morning BEFORE BREAKFAST 5/31/22  Yes TIMO Sharma CNP   Cholecalciferol (VITAMIN D3) 50 MCG (2000 UT) CAPS Take 1 capsule by mouth daily 3/2/22  Yes Historical Provider, MD   vitamin B-12 (CYANOCOBALAMIN) 1000 MCG tablet Take 1 tablet by mouth daily 3/2/22  Yes Historical Provider, MD   Blood Glucose Monitoring Suppl (FREESTYLE LITE) JEAN 1 Device by Does not apply route daily 1/11/22  Yes TIMO Sharma CNP   famotidine (PEPCID) 40 MG tablet Take 1 tablet by mouth daily 12/27/21  Yes TIMO Sharma - CNP   metFORMIN (GLUCOPHAGE) 1000 MG tablet take 1 tablet by mouth twice a day with meals 21  Yes TIMO Benavides CNP   Isopropyl Alcohol (ALCOHOL WIPES) 70 % MISC Apply 1 Units topically 4 times daily 21  Yes TIMO Benavides CNP   Insulin Pen Needle 31G X 5 MM MISC 1 each by Does not apply route daily 21  Yes TIMO Marion CNP   Tens Unit MISC by Does not apply route 20  Yes TIMO Marion CNP   albuterol sulfate  (90 Base) MCG/ACT inhaler Inhale 2 puffs into the lungs every 6 hours as needed for Wheezing 4/3/20  Yes TIMO Marion CNP       Allergies   Allergen Reactions    Dust Mite Extract Shortness Of Breath    Molds & Smuts Shortness Of Breath    Other Shortness Of Breath    Pollen Extract Shortness Of Breath       Social History     Tobacco Use    Smoking status: Former     Packs/day: 0.25     Years: 13.00     Pack years: 3.25     Types: Cigarettes     Start date:      Quit date:      Years since quittin.8    Smokeless tobacco: Former     Types: Snuff, Chew     Quit date:    Vaping Use    Vaping Use: Never used   Substance Use Topics    Alcohol use: Not Currently    Drug use: Yes     Types: Marijuana (Maykel Johnson)       family history includes Asthma in his paternal grandmother; Cancer in his father; Diabetes in his father, maternal grandmother, mother, and paternal grandmother; Heart Attack in his maternal grandfather; Heart Disease in his father and maternal grandfather. REVIEW OF SYSTEMS:     Curt Baldwin denies fever/chills, chest pain, shortness of breath, new bowel or bladder complaints. All other review of systems was negative except as noted above.     PHYSICAL EXAMINATION:      /70 (Site: Left Upper Arm, Position: Sitting, Cuff Size: Large Adult)   Pulse 83   Temp 98.5 °F (36.9 °C) (Temporal)   Resp 20   Ht 6' 3\" (1.905 m)   Wt (!) 389 lb 6.4 oz (176.6 kg)   SpO2 98%   BMI 48.67 kg/m²     General:  Pleasant in no distress and A & O x 3, Build: Overweight, Function: Rises from seated position easily      HEENT:normocephalic, atraumatic, Pupils regular, round, equal Sclera: icterus absent      Lungs: Breathing:normal breath pattern, unlabored     CVS: RRR, pulses intact b/l     Abdomen: obese and normal Non tender, no guarding. Cervical spine: Inspection: normal   Palpation: No tenderness over the midline and paraspinal area, bilaterally   Range of motion: Normal flexion, extension, rotation bilaterally and is not painful. Spurling's: negative bilaterally   Estevez's: negative bilaterally      Thoracic spine: Inspection: normal   Palpation:No tenderness over the midline and paraspinal area, bilaterally  Range of motion: normal in flexion, extension rotation bilateral and is not painful. Lumbar spine: Inspection: normal   Spine Range of motion: Normal, flexion Normal, Lateral bending, extension and rotation  normal is somewhat painful. Palpation:midline tenderness. Facet Loading: left-negative and right-negative.      Musculoskeletal:  Sacroiliac joint tenderness No bilaterally   EVELYN test: negative bilaterally   Gaenslen's test:negative bilaterally  Piriformis tenderness: negative bilaterally   SLR : negative bilaterally   Trochanteric bursa tenderness: negative bilaterally   CVA tenderness: No       Extremities:  Tremors: None bilaterally upper and lower   Range of motion:  normal  Shoulders: Generally normal shoulders grossly, but some limitation with Abduction >90 ,    Hips: no pain with internal rotation of hips   Varicose veins: absent    Pulses: present Lt radial   Cyanosis: none   Edema: none x all 4 extremities      Neurological: Sensory: Decreased sensation to light touch in all extremities, primarily all fingers and toes , CN 2-12 grossly intact      MOTOR Left (x/5) Right (x/5)   Shoulder Abduction (C5)  5 5   Biceps - Elbow Flexion (C6)  5 5   Triceps - Elbow Extension (C7)  5 5   Hand  (C8)  5 5   Iliopsoas - Hip flex /Abd (L2)  5 5   Quadriceps - Knee Ext (L3)  5 5   Ant Tibialis - Ankle Dorsiflex (L4)  5 5   Post Tibialis - Hip Ext/Abd Foot dorsiflex (L5)  5 5   Gastrocnemius - Plantar flex (S1)  5 5      REFLEXES Left Right   Brachioradialis (C5/6)  2+ 2+   Biceps (C5/6)  2+ 2+   Triceps (C7)  2+ 2+   Quadriceps / Patellar (L4)  2+ 2+   Achilles (S1)  2+ 2+      Gait:antalgic     Dermatology: Skin:no unusual rashes      Impression:  Assessment/Plan:  Diagnoses and all orders for this visit:  Diabetic neuropathy, painful (HCC)  Chronic midline low back pain, unspecified whether sciatica present  Morbid obesity ([address]  Other orders  -     cyclobenzaprine (FLEXERIL) 5 MG tablet; Take 1 tablet by mouth 2 times daily as needed for Muscle spasms    48 y.o. male with h/o diabetes type 2, asthma, anxiety, GERD, hypertension, peripheral neuropathy, morbid obesity, restless leg syndrome, EDDIE who presents today with multiple year history of painful peripheral neuropathy in all extremities, as well as low back pain. He is lost a significant amount of weight, and has decreased his hemoglobin A1c from 12 down to 6. His pain continues to likely be neuropathic in nature, and his feet are keeping him up at night. He has now tried lyrica, gabapentin, duloxetine, with no improvement. He is now off cymbalta and lyrica due to side effects. He has continued to take the tramadol PRN. His xray lumbar was overall normal, showing Mild degenerative change without acute process. He started in PT at [facility] but experienced anxiety symptoms due to being in a large room with many people, thus he did not wish to continue. Given his symptoms we will d/c the Lyrica and duloxetine, and try Flexeril 5mg QHS PRN upto 10mg to see if this provides him with symptom relief. He will also continue the HES and attempt to restart PT when he feels comfortable. He will follow up with us in 8 weeks.      Plan:   Therapy: Continue PT/HES   Imaging: n/a  Medications: d/c duloxetine and lyrica. Start flexeril 5mg QHS upto BID PRN  Interventions: none  Consults: follow up with podiatry  Follow up: 8 weeks  Urine screen today: no     I spent a total of 26 minutes on the date of the service which included preparing to see the patient, face-to-face patient care, completing clinical documentation, obtaining and/or reviewing separately obtained history, performing a medically appropriate exam, counseling and educating the patient/family/caregiver and ordering medications, tests, or procedures. NOTE: The above documentation was prepared using voice recognition software. Every attempt was made to ensure accuracy but there may be spelling, grammatical, and contextual errors. Aidan Moura MD    Controlled Substances Monitoring:   RX Monitoring 1/18/2021   Attestation -   Periodic Controlled Substance Monitoring Possible medication side effects, risk of tolerance/dependence & alternative treatments discussed. ;No signs of potential drug abuse or diversion identified. ;Assessed functional status. OARRS report reviewed 11/16/22   CC:     No referring provider defined for this encounter.    Emily Ku, APRN - CNP   [address] [place] [id number]

## 2025-02-10 ENCOUNTER — HOSPITAL ENCOUNTER (OUTPATIENT)
Dept: RADIOLOGY | Facility: OTHER | Age: 45
Discharge: HOME | End: 2025-02-10
Attending: STUDENT IN AN ORGANIZED HEALTH CARE EDUCATION/TRAINING PROGRAM
Payer: COMMERCIAL

## 2025-02-10 DIAGNOSIS — Z01.419 WELL WOMAN EXAM WITH ROUTINE GYNECOLOGICAL EXAM: ICD-10-CM

## 2025-02-10 DIAGNOSIS — Z12.31 BREAST CANCER SCREENING BY MAMMOGRAM: ICD-10-CM

## 2025-02-10 PROCEDURE — 77067 SCR MAMMO BI INCL CAD: CPT | Mod: TC

## 2025-02-10 PROCEDURE — 77067 SCR MAMMO BI INCL CAD: CPT | Mod: 26,,, | Performed by: RADIOLOGY

## 2025-02-10 PROCEDURE — 77063 BREAST TOMOSYNTHESIS BI: CPT | Mod: 26,,, | Performed by: RADIOLOGY

## 2025-02-15 ENCOUNTER — OFFICE VISIT (OUTPATIENT)
Dept: URGENT CARE | Facility: CLINIC | Age: 45
End: 2025-02-15
Payer: COMMERCIAL

## 2025-02-15 VITALS
OXYGEN SATURATION: 98 % | HEART RATE: 91 BPM | DIASTOLIC BLOOD PRESSURE: 70 MMHG | SYSTOLIC BLOOD PRESSURE: 92 MMHG | RESPIRATION RATE: 18 BRPM | TEMPERATURE: 100 F | WEIGHT: 190 LBS | HEIGHT: 64 IN | BODY MASS INDEX: 32.44 KG/M2

## 2025-02-15 DIAGNOSIS — J11.1 INFLUENZA: Primary | ICD-10-CM

## 2025-02-15 LAB
CTP QC/QA: YES
CTP QC/QA: YES
POC MOLECULAR INFLUENZA A AGN: POSITIVE
POC MOLECULAR INFLUENZA B AGN: NEGATIVE
SARS CORONAVIRUS 2 ANTIGEN: NEGATIVE

## 2025-02-15 PROCEDURE — 87502 INFLUENZA DNA AMP PROBE: CPT | Mod: QW,S$GLB,, | Performed by: FAMILY MEDICINE

## 2025-02-15 RX ORDER — BENZONATATE 100 MG/1
100 CAPSULE ORAL 3 TIMES DAILY PRN
Qty: 30 CAPSULE | Refills: 1 | Status: SHIPPED | OUTPATIENT
Start: 2025-02-15 | End: 2025-02-25

## 2025-02-15 RX ORDER — PROMETHAZINE HYDROCHLORIDE AND DEXTROMETHORPHAN HYDROBROMIDE 6.25; 15 MG/5ML; MG/5ML
5 SYRUP ORAL NIGHTLY PRN
Qty: 118 ML | Refills: 0 | Status: SHIPPED | OUTPATIENT
Start: 2025-02-15 | End: 2025-02-25

## 2025-02-15 RX ORDER — OSELTAMIVIR PHOSPHATE 75 MG/1
75 CAPSULE ORAL 2 TIMES DAILY
Qty: 10 CAPSULE | Refills: 0 | Status: SHIPPED | OUTPATIENT
Start: 2025-02-15 | End: 2025-02-20

## 2025-02-15 NOTE — PROGRESS NOTES
"Subjective:      Patient ID: Chantel Alas is a 45 y.o. female.    Vitals:  height is 5' 4" (1.626 m) and weight is 86.2 kg (190 lb). Her oral temperature is 99.9 °F (37.7 °C). Her blood pressure is 92/70 and her pulse is 91. Her respiration is 18 and oxygen saturation is 98%.     Chief Complaint: Sore Throat (I have a dry, heaving, cough, low-grade fever, slight headache, fatigue, and sore throat - Entered by patient)    This is a 45 y.o. female who presents today with a chief complaint of  sore throat, cough, dry heaving, body aches, fatigue, chills, and nausea onset 1 day ago. 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 The current episode started yesterday. The problem has been unchanged. Neither side of throat is experiencing more pain than the other. There has been no fever. The pain is at a severity of 6/10. The pain is moderate. Associated symptoms include congestion, coughing, headaches, a hoarse voice and trouble swallowing. Pertinent negatives include no abdominal pain, diarrhea, drooling, ear discharge, ear pain, plugged ear sensation, neck pain, shortness of breath, stridor, swollen glands or vomiting. She has had no exposure to strep or mono. She has tried acetaminophen for the symptoms. The treatment provided mild relief.       HENT:  Positive for congestion, sore throat and trouble swallowing. Negative for ear pain, ear discharge and drooling.    Neck: Negative for neck pain.   Respiratory:  Positive for cough. Negative for shortness of breath and stridor.    Gastrointestinal:  Negative for abdominal pain, vomiting and diarrhea.   Neurological:  Positive for headaches.      Objective:     Physical Exam   Constitutional: She appears ill. No distress.   HENT:   Head: Normocephalic and atraumatic.   Nose: Congestion present.   Mouth/Throat: Mucous membranes are moist. No oropharyngeal exudate or " posterior oropharyngeal erythema.   Eyes: Pupils are equal, round, and reactive to light. Extraocular movement intact   Neck: Neck supple.   Cardiovascular: Normal rate, regular rhythm, normal heart sounds and normal pulses.   Pulmonary/Chest: Effort normal and breath sounds normal.   Abdominal: Normal appearance.   Neurological: She is alert.   Nursing note and vitals reviewed.    Results for orders placed or performed in visit on 02/15/25   POCT Influenza A/B MOLECULAR    Collection Time: 02/15/25  4:24 PM   Result Value Ref Range    POC Molecular Influenza A Ag Positive (A) Negative    POC Molecular Influenza B Ag Negative Negative     Acceptable Yes    SARS Coronavirus 2 Antigen, POCT Manual Read    Collection Time: 02/15/25  4:25 PM   Result Value Ref Range    SARS Coronavirus 2 Antigen Negative Negative, Presumptive Negative     Acceptable Yes         Assessment:     1. Influenza        Plan:       Influenza  -     SARS Coronavirus 2 Antigen, POCT Manual Read  -     POCT Influenza A/B MOLECULAR  -     oseltamivir (TAMIFLU) 75 MG capsule; Take 1 capsule (75 mg total) by mouth 2 (two) times daily. for 5 days  Dispense: 10 capsule; Refill: 0  -     benzonatate (TESSALON) 100 MG capsule; Take 1 capsule (100 mg total) by mouth 3 (three) times daily as needed for Cough.  Dispense: 30 capsule; Refill: 1  -     promethazine-dextromethorphan (PROMETHAZINE-DM) 6.25-15 mg/5 mL Syrp; Take 5 mLs by mouth nightly as needed (cough).  Dispense: 118 mL; Refill: 0    Discussed OTC symptom relief

## 2025-02-15 NOTE — LETTER
February 15, 2025      [facility]  [address]  [facility] [address]  Phone: [phone]  Fax: [phone]       Patient: Chantel Alas   YOB: 1980  Date of Visit: 02/15/2025    To Whom It May Concern:    Carole Alas  was at [facility] on 02/15/2025. The patient may return to work/school on 02/19/2025 with no restrictions. If you have any questions or concerns, or if I can be of further assistance, please do not hesitate to contact me.    Sincerely,        Harley Robles MD

## 2025-03-17 ENCOUNTER — PATIENT MESSAGE (OUTPATIENT)
Dept: ADMINISTRATIVE | Facility: HOSPITAL | Age: 45
End: 2025-03-17
Payer: COMMERCIAL

## 2025-03-20 ENCOUNTER — PATIENT OUTREACH (OUTPATIENT)
Dept: ADMINISTRATIVE | Facility: HOSPITAL | Age: 45
End: 2025-03-20
Payer: COMMERCIAL

## 2025-03-20 DIAGNOSIS — Z12.12 SCREENING FOR COLORECTAL CANCER: Primary | ICD-10-CM

## 2025-03-20 DIAGNOSIS — Z12.11 SCREENING FOR COLORECTAL CANCER: Primary | ICD-10-CM

## 2025-03-21 ENCOUNTER — HOSPITAL ENCOUNTER (OUTPATIENT)
Dept: RADIOLOGY | Facility: OTHER | Age: 45
Discharge: HOME | End: 2025-03-21
Attending: STUDENT IN AN ORGANIZED HEALTH CARE EDUCATION/TRAINING PROGRAM
Payer: COMMERCIAL

## 2025-03-21 DIAGNOSIS — R92.8 ABNORMAL MAMMOGRAM: ICD-10-CM

## 2025-03-21 PROCEDURE — 77061 BREAST TOMOSYNTHESIS UNI: CPT | Mod: 26,RT,, | Performed by: RADIOLOGY

## 2025-03-21 PROCEDURE — 76642 ULTRASOUND BREAST LIMITED: CPT | Mod: 26,RT,, | Performed by: RADIOLOGY

## 2025-03-21 PROCEDURE — 77065 DX MAMMO INCL CAD UNI: CPT | Mod: TC,RT

## 2025-03-21 PROCEDURE — 76642 ULTRASOUND BREAST LIMITED: CPT | Mod: TC,RT

## 2025-03-21 PROCEDURE — 77065 DX MAMMO INCL CAD UNI: CPT | Mod: 26,RT,, | Performed by: RADIOLOGY

## 2025-03-28 ENCOUNTER — HOSPITAL ENCOUNTER (OUTPATIENT)
Dept: RADIOLOGY | Facility: OTHER | Age: 45
Discharge: HOME | End: 2025-03-28
Attending: STUDENT IN AN ORGANIZED HEALTH CARE EDUCATION/TRAINING PROGRAM
Payer: COMMERCIAL

## 2025-03-28 ENCOUNTER — PATIENT MESSAGE (OUTPATIENT)
Dept: RADIOLOGY | Facility: OTHER | Age: 45
End: 2025-03-28

## 2025-03-28 DIAGNOSIS — R92.8 ABNORMAL MAMMOGRAM: ICD-10-CM

## 2025-03-28 DIAGNOSIS — N63.0 BREAST MASS IN FEMALE: Primary | ICD-10-CM

## 2025-03-28 DIAGNOSIS — E66.811 CLASS 1 OBESITY WITHOUT SERIOUS COMORBIDITY WITH BODY MASS INDEX (BMI) OF 32.0 TO 32.9 IN ADULT, UNSPECIFIED OBESITY TYPE: ICD-10-CM

## 2025-03-28 PROCEDURE — 63600175 PHARM REV CODE 636 W HCPCS: Performed by: STUDENT IN AN ORGANIZED HEALTH CARE EDUCATION/TRAINING PROGRAM

## 2025-03-28 PROCEDURE — 27201068 US BREAST BIOPSY WITH IMAGING 1ST SITE RIGHT

## 2025-03-28 PROCEDURE — 88305 TISSUE EXAM BY PATHOLOGIST: CPT | Mod: 26,,, | Performed by: PATHOLOGY

## 2025-03-28 PROCEDURE — 77065 DX MAMMO INCL CAD UNI: CPT | Mod: 26,RT,, | Performed by: RADIOLOGY

## 2025-03-28 PROCEDURE — 19083 BX BREAST 1ST LESION US IMAG: CPT | Mod: RT,,, | Performed by: RADIOLOGY

## 2025-03-28 PROCEDURE — 77065 DX MAMMO INCL CAD UNI: CPT | Mod: TC,RT

## 2025-03-28 PROCEDURE — 88305 TISSUE EXAM BY PATHOLOGIST: CPT | Mod: TC | Performed by: RADIOLOGY

## 2025-03-28 RX ORDER — LIDOCAINE HYDROCHLORIDE AND EPINEPHRINE 10; 20 UG/ML; MG/ML
10 INJECTION, SOLUTION INFILTRATION; PERINEURAL ONCE
Status: COMPLETED | OUTPATIENT
Start: 2025-03-28 | End: 2025-03-28

## 2025-03-28 RX ORDER — LIDOCAINE HYDROCHLORIDE 10 MG/ML
5 INJECTION, SOLUTION INFILTRATION; PERINEURAL ONCE
Status: COMPLETED | OUTPATIENT
Start: 2025-03-28 | End: 2025-03-28

## 2025-03-28 RX ADMIN — LIDOCAINE HYDROCHLORIDE,EPINEPHRINE BITARTRATE 10 ML: 20; .01 INJECTION, SOLUTION INFILTRATION; PERINEURAL at 02:03

## 2025-03-28 RX ADMIN — LIDOCAINE HYDROCHLORIDE 5 ML: 10 INJECTION, SOLUTION EPIDURAL; INFILTRATION; INTRACAUDAL at 02:03

## 2025-03-30 ENCOUNTER — PATIENT MESSAGE (OUTPATIENT)
Dept: URGENT CARE | Facility: CLINIC | Age: 45
End: 2025-03-30

## 2025-03-30 ENCOUNTER — OFFICE VISIT (OUTPATIENT)
Dept: URGENT CARE | Facility: CLINIC | Age: 45
End: 2025-03-30
Payer: COMMERCIAL

## 2025-03-30 VITALS
WEIGHT: 190 LBS | HEIGHT: 64 IN | SYSTOLIC BLOOD PRESSURE: 111 MMHG | HEART RATE: 83 BPM | DIASTOLIC BLOOD PRESSURE: 78 MMHG | RESPIRATION RATE: 16 BRPM | BODY MASS INDEX: 32.44 KG/M2 | TEMPERATURE: 99 F | OXYGEN SATURATION: 98 %

## 2025-03-30 DIAGNOSIS — B36.9 OTOMYCOSIS OF LEFT EAR: ICD-10-CM

## 2025-03-30 DIAGNOSIS — H60.541 ACUTE ECZEMATOID OTITIS EXTERNA OF RIGHT EAR: ICD-10-CM

## 2025-03-30 DIAGNOSIS — H60.392 OTHER INFECTIVE ACUTE OTITIS EXTERNA OF LEFT EAR: ICD-10-CM

## 2025-03-30 DIAGNOSIS — H92.02 ACUTE OTALGIA, LEFT: Primary | ICD-10-CM

## 2025-03-30 DIAGNOSIS — H62.42 OTOMYCOSIS OF LEFT EAR: ICD-10-CM

## 2025-03-30 PROCEDURE — 96372 THER/PROPH/DIAG INJ SC/IM: CPT | Mod: S$GLB,,, | Performed by: PHYSICIAN ASSISTANT

## 2025-03-30 PROCEDURE — 99213 OFFICE O/P EST LOW 20 MIN: CPT | Mod: 25,S$GLB,, | Performed by: PHYSICIAN ASSISTANT

## 2025-03-30 RX ORDER — KETOROLAC TROMETHAMINE 30 MG/ML
30 INJECTION, SOLUTION INTRAMUSCULAR; INTRAVENOUS
Status: COMPLETED | OUTPATIENT
Start: 2025-03-30 | End: 2025-03-30

## 2025-03-30 RX ORDER — CLOTRIMAZOLE 1 G/ML
SOLUTION TOPICAL 2 TIMES DAILY
Qty: 30 ML | Refills: 0 | Status: SHIPPED | OUTPATIENT
Start: 2025-03-30 | End: 2025-04-13

## 2025-03-30 RX ORDER — IBUPROFEN 800 MG/1
800 TABLET ORAL EVERY 6 HOURS PRN
Qty: 28 TABLET | Refills: 0 | Status: SHIPPED | OUTPATIENT
Start: 2025-03-31 | End: 2025-04-07

## 2025-03-30 RX ORDER — CIPROFLOXACIN AND DEXAMETHASONE 3; 1 MG/ML; MG/ML
4 SUSPENSION/ DROPS AURICULAR (OTIC) 2 TIMES DAILY
Qty: 7.5 ML | Refills: 0 | Status: SHIPPED | OUTPATIENT
Start: 2025-03-30 | End: 2025-04-06

## 2025-03-30 RX ORDER — NEOMYCIN SULFATE, POLYMYXIN B SULFATE AND HYDROCORTISONE 10; 3.5; 1 MG/ML; MG/ML; [USP'U]/ML
3 SUSPENSION/ DROPS AURICULAR (OTIC) 3 TIMES DAILY
Qty: 10 ML | Refills: 0 | Status: SHIPPED | OUTPATIENT
Start: 2025-03-30 | End: 2025-04-06

## 2025-03-30 RX ADMIN — KETOROLAC TROMETHAMINE 30 MG: 30 INJECTION, SOLUTION INTRAMUSCULAR; INTRAVENOUS at 03:03

## 2025-03-30 NOTE — PROGRESS NOTES
"Subjective:      Patient ID: Chantel Alas is a 45 y.o. female.    Vitals:  height is 5' 4.02" (1.626 m) and weight is 86.2 kg (190 lb). Her oral temperature is 98.5 °F (36.9 °C). Her blood pressure is 111/78 and her pulse is 83. Her respiration is 16 and oxygen saturation is 98%.     Chief Complaint: Ear Problem (Entered by patient)    Chantel Alas is a 45 y.o. female who complains of Lt ear pressure and pain, sx started a few days ago. Home tx: Tylenol and reports no improvement.Pt admits to q-tip use.  Patient had flu 1 month ago. Pt reports itchy ears. States she used q-tip and saw black drainage from both ears.Pain rated 6/10; pain worse with touch to tragus and with air touching ears.     Otalgia   There is pain in the left ear. This is a new problem. There has been no fever. The pain is at a severity of 5/10. Associated symptoms include ear discharge. Pertinent negatives include no coughing, hearing loss, rhinorrhea or sore throat. She has tried acetaminophen for the symptoms. The treatment provided mild relief. There is no history of a chronic ear infection, hearing loss or a tympanostomy tube.       Constitution: Negative for fatigue and fever.   HENT:  Positive for ear pain and ear discharge. Negative for foreign body in ear, tinnitus, hearing loss, congestion and sore throat.    Respiratory:  Negative for cough.    Allergic/Immunologic: Negative for environmental allergies and seasonal allergies.      Objective:     Physical Exam   Constitutional: She is oriented to person, place, and time. She is cooperative. No distress.      Comments:Patient is awake and alert, sitting up in exam chair, speaking and answering in complete sentences     normalawake  HENT:   Head: Normocephalic and atraumatic.      Comments:     Ears:   Right Ear: Tympanic membrane and external ear normal. There is swelling and tenderness. No mastoid tenderness.   Left Ear: Tympanic membrane and external ear normal. There is drainage, " swelling and tenderness. No mastoid tenderness.      Comments: White-yellowish fuzzy balls with black dots to left external ear canal   Nose: Nose normal.   Mouth/Throat: Mucous membranes are moist. Oropharynx is clear.   Eyes: Conjunctivae are normal. Pupils are equal, round, and reactive to light. Extraocular movement intact   Neck: Neck supple.   Cardiovascular: Normal rate, regular rhythm, normal heart sounds and normal pulses.   Pulmonary/Chest: Effort normal and breath sounds normal. No respiratory distress. She has no wheezes. She has no rhonchi. She has no rales.   Abdominal: Normal appearance.   Musculoskeletal: Normal range of motion.         General: Normal range of motion.      Cervical back: She exhibits no tenderness.   Lymphadenopathy:     She has no cervical adenopathy.   Neurological: She is alert and oriented to person, place, and time.   Skin: Skin is warm.   Psychiatric: Her behavior is normal. Mood, judgment and thought content normal.   Nursing note and vitals reviewed.      Assessment:     1. Acute otalgia, left    2. Otomycosis of left ear    3. Other infective acute otitis externa of left ear    4. Acute eczematoid otitis externa of right ear      Patient presents with clinical exam findings and history consistent with above.      On exam, patient is nontoxic appearing and vitals are stable.      Diagnostic testing results were reviewed and discussed with patient/guardian.   Tests ordered in clinic: None  Previous progress notes/admissions/labs and medications were reviewed.  Reviewed GFR > 60 from CMP on 11/16/24.    Plan:   Discussed that AVS summary shows ciprodex is $81; sent in corticosporin as backup. Cipro HC is $150. Pt given Goodrx coupon. Ciprodex is recommended,    Acute otalgia, left  -     ketorolac injection 30 mg  -     ciprofloxacin-dexAMETHasone 0.3-0.1% (CIPRODEX) 0.3-0.1 % DrpS; Place 4 drops into both ears 2 (two) times daily. for 7 days  Dispense: 7.5 mL; Refill: 0  -      clotrimazole (LOTRIMIN) 1 % Soln; Apply topically 2 (two) times daily. for 14 days  Dispense: 30 mL; Refill: 0  -     Ambulatory referral/consult to ENT  -     ibuprofen (ADVIL,MOTRIN) 800 MG tablet; Take 1 tablet (800 mg total) by mouth every 6 (six) hours as needed for Pain.  Dispense: 28 tablet; Refill: 0  -     neomycin-polymyxin-hydrocortisone (CORTISPORIN) 3.5-10,000-1 mg/mL-unit/mL-% otic suspension; Place 3 drops into both ears 3 (three) times daily. for 7 days  Dispense: 10 mL; Refill: 0    Otomycosis of left ear  -     clotrimazole (LOTRIMIN) 1 % Soln; Apply topically 2 (two) times daily. for 14 days  Dispense: 30 mL; Refill: 0  -     Ambulatory referral/consult to ENT    Other infective acute otitis externa of left ear  -     ciprofloxacin-dexAMETHasone 0.3-0.1% (CIPRODEX) 0.3-0.1 % DrpS; Place 4 drops into both ears 2 (two) times daily. for 7 days  Dispense: 7.5 mL; Refill: 0  -     Ambulatory referral/consult to ENT  -     neomycin-polymyxin-hydrocortisone (CORTISPORIN) 3.5-10,000-1 mg/mL-unit/mL-% otic suspension; Place 3 drops into both ears 3 (three) times daily. for 7 days  Dispense: 10 mL; Refill: 0    Acute eczematoid otitis externa of right ear  -     ciprofloxacin-dexAMETHasone 0.3-0.1% (CIPRODEX) 0.3-0.1 % DrpS; Place 4 drops into both ears 2 (two) times daily. for 7 days  Dispense: 7.5 mL; Refill: 0  -     neomycin-polymyxin-hydrocortisone (CORTISPORIN) 3.5-10,000-1 mg/mL-unit/mL-% otic suspension; Place 3 drops into both ears 3 (three) times daily. for 7 days  Dispense: 10 mL; Refill: 0                    1) See orders for this visit as documented in the electronic medical record.  2) Symptomatic therapy suggested: use acetaminophen/ibuprofen every 6-8 hours prn pain or fever, push fluids.   3) Call or return to clinic prn if these symptoms worsen or fail to improve as anticipated.    Discussed results/diagnosis/plan with patient in clinic.  We had shared decision making for patient's  "treatment. Patient verbalized understanding and in agreement with current treatment plan.     Patient was instructed to return for re-evaluation with urgent care or PCP for continued outpatient workup and management if symptoms do not improve/worsening symptoms. Strict ED versus clinic precautions given in depth.    Discharge and follow-up instructions given verbally/printed with the patient who expressed understanding. The instructions and results are also available on [facility].              Patiencekely Schwarz PA-C (Jackie)          Patient Instructions   Discussed with patient that if he/she is in pain today, only take tylenol due to toradol injection received in clinic. You can continue NSAIDS tomorrow such as ibuprofen (Motrin/Advil), naproxen (Aleve), Mobic (Meloxicam).       Recommend OTC swimmer's ear drops (contains isopropyl alcohol); use after water exposure to dry up ear.      If not allergic, take Tylenol (Acetaminophen) 650 mg to  1 g every 6 hours as needed for fever/pain and/or Motrin (Ibuprofen) 600 to 800 mg every 6 hours as needed for pain and/or fever              Please remember that you have received care at an urgent care today. Urgent cares are not emergency rooms and are not equipped to handle life threatening emergencies and cannot rule in or out certain medical conditions and you may be released before all of your medical problems are known or treated. Please arrange follow up with your primary care physician or speciality clinic  within 2-5 days if your signs and symptoms have not resolved or worsen. Patient can call our Referral Hotline at [phone] to make an appointment.    Please return here or go to the Emergency Department for any concerns or worsening of condition.Patient was educated on signs/symptoms that would warrant emergent medical attention. Patient verbalized understanding.  Your symptoms are not getting better in 2 to 3 days.  You continue to have problems hearing after 2 to 3 " weeks.  You have a fever of 100.4°F (38°C) or higher or chills.  You have discharge or fluid coming from your ear.

## 2025-03-30 NOTE — LETTER
"  March 30, 2025      [facility] [place]  [place] [address]  Phone: [phone]  Fax: [phone]       Patient: Chantel Alas   YOB: 1980  Date of Visit: 03/30/2025    To Whom It May Concern:    Carole Alas  was at [facility] on 03/30/2025. The patient may return to work/school on 3/31/25 with no restrictions. If you have any questions or concerns, or if I can be of further assistance, please do not hesitate to contact me.    Sincerely,          Patience Schwarz PA-C (Jackie)       "

## 2025-03-30 NOTE — PATIENT INSTRUCTIONS
Discussed with patient that if he/she is in pain today, only take tylenol due to toradol injection received in clinic. You can continue NSAIDS tomorrow such as ibuprofen (Motrin/Advil), naproxen (Aleve), Mobic (Meloxicam).       Recommend OTC swimmer's ear drops (contains isopropyl alcohol); use after water exposure to dry up ear.      If not allergic, take Tylenol (Acetaminophen) 650 mg to  1 g every 6 hours as needed for fever/pain and/or Motrin (Ibuprofen) 600 to 800 mg every 6 hours as needed for pain and/or fever              Please remember that you have received care at an urgent care today. Urgent cares are not emergency rooms and are not equipped to handle life threatening emergencies and cannot rule in or out certain medical conditions and you may be released before all of your medical problems are known or treated. Please arrange follow up with your primary care physician or speciality clinic  within 2-5 days if your signs and symptoms have not resolved or worsen. Patient can call our Referral Hotline at [phone] to make an appointment.    Please return here or go to the Emergency Department for any concerns or worsening of condition.Patient was educated on signs/symptoms that would warrant emergent medical attention. Patient verbalized understanding.  Your symptoms are not getting better in 2 to 3 days.  You continue to have problems hearing after 2 to 3 weeks.  You have a fever of 100.4°F (38°C) or higher or chills.  You have discharge or fluid coming from your ear.

## 2025-03-31 ENCOUNTER — RESULTS FOLLOW-UP (OUTPATIENT)
Dept: OBSTETRICS AND GYNECOLOGY | Facility: CLINIC | Age: 45
End: 2025-03-31

## 2025-03-31 ENCOUNTER — TELEPHONE (OUTPATIENT)
Dept: ADMINISTRATIVE | Facility: HOSPITAL | Age: 45
End: 2025-03-31
Payer: COMMERCIAL

## 2025-03-31 LAB
DHEA SERPL-MCNC: NORMAL
ESTROGEN SERPL-MCNC: NORMAL PG/ML
INSULIN SERPL-ACNC: NORMAL U[IU]/ML
LAB AP GROSS DESCRIPTION: NORMAL
LAB AP PERFORMING LOCATION(S): NORMAL
LAB AP REPORT FOOTNOTES: NORMAL

## 2025-03-31 RX ORDER — TOPIRAMATE 25 MG/1
25 TABLET ORAL DAILY
Qty: 30 TABLET | Refills: 11 | Status: SHIPPED | OUTPATIENT
Start: 2025-03-31

## 2025-03-31 NOTE — TELEPHONE ENCOUNTER
Refill Routing Note   Medication(s) are not appropriate for processing by [facility] for the following reason(s):        Outside of protocol    [facility] action(s):  Route               Appointments  past 12m or future 3m with PCP    Date Provider   Last Visit   11/14/2024 Doretha Hernandes MD   Next Visit   Visit date not found Doretha Hernandes MD   ED visits in past 90 days: 0        Note composed:8:54 AM 03/31/2025

## 2025-04-01 ENCOUNTER — TELEPHONE (OUTPATIENT)
Dept: RADIOLOGY | Facility: OTHER | Age: 45
End: 2025-04-01
Payer: COMMERCIAL

## 2025-04-01 NOTE — TELEPHONE ENCOUNTER
Patient notified of right breast biopsy results per pathology reported on 3/31/25. Diagnosis: FIBROADENOMA. Explained to patient results are negative for breast cancer. Recommendation is for excisional biopsy. Instructed on need for consultation with breast surgeon. Questions answered. Appointment with breast surgeon requested. Office will call patient directly to confirm appointment. Patient verbalized understanding. Biopsy site WNL. Encouraged to call [phone] for further questions or concerns.

## 2025-04-02 ENCOUNTER — TELEPHONE (OUTPATIENT)
Dept: SURGERY | Facility: CLINIC | Age: 45
End: 2025-04-02
Payer: COMMERCIAL

## 2025-04-02 ENCOUNTER — OFFICE VISIT (OUTPATIENT)
Dept: OTOLARYNGOLOGY | Facility: CLINIC | Age: 45
End: 2025-04-02
Payer: COMMERCIAL

## 2025-04-02 VITALS
BODY MASS INDEX: 31.67 KG/M2 | WEIGHT: 185.5 LBS | HEIGHT: 64 IN | DIASTOLIC BLOOD PRESSURE: 77 MMHG | SYSTOLIC BLOOD PRESSURE: 133 MMHG

## 2025-04-02 DIAGNOSIS — H62.42 OTITIS EXTERNA, FUNGAL, LEFT EAR: Primary | ICD-10-CM

## 2025-04-02 DIAGNOSIS — B36.9 OTITIS EXTERNA, FUNGAL, LEFT EAR: Primary | ICD-10-CM

## 2025-04-02 DIAGNOSIS — H60.543 ECZEMA OF EXTERNAL EAR, BILATERAL: ICD-10-CM

## 2025-04-02 PROCEDURE — 3008F BODY MASS INDEX DOCD: CPT | Mod: CPTII,S$GLB,, | Performed by: NURSE PRACTITIONER

## 2025-04-02 PROCEDURE — 99204 OFFICE O/P NEW MOD 45 MIN: CPT | Mod: S$GLB,,, | Performed by: NURSE PRACTITIONER

## 2025-04-02 PROCEDURE — 1159F MED LIST DOCD IN RCRD: CPT | Mod: CPTII,S$GLB,, | Performed by: NURSE PRACTITIONER

## 2025-04-02 PROCEDURE — 3075F SYST BP GE 130 - 139MM HG: CPT | Mod: CPTII,S$GLB,, | Performed by: NURSE PRACTITIONER

## 2025-04-02 PROCEDURE — 3078F DIAST BP <80 MM HG: CPT | Mod: CPTII,S$GLB,, | Performed by: NURSE PRACTITIONER

## 2025-04-02 PROCEDURE — 92504 EAR MICROSCOPY EXAMINATION: CPT | Mod: S$GLB,,, | Performed by: NURSE PRACTITIONER

## 2025-04-02 RX ORDER — BUSPIRONE HYDROCHLORIDE 7.5 MG/1
7.5 TABLET ORAL 2 TIMES DAILY
COMMUNITY
Start: 2025-03-28

## 2025-04-02 RX ORDER — CLOTRIMAZOLE AND BETAMETHASONE DIPROPIONATE 10; .64 MG/G; MG/G
CREAM TOPICAL 2 TIMES DAILY
Qty: 1 EACH | Refills: 2 | Status: SHIPPED | OUTPATIENT
Start: 2025-04-02 | End: 2025-04-09

## 2025-04-02 RX ORDER — BUPROPION HYDROCHLORIDE 150 MG/1
150 TABLET ORAL EVERY MORNING
COMMUNITY
Start: 2025-03-28

## 2025-04-02 NOTE — TELEPHONE ENCOUNTER
Contacted the patient regarding the message below. The patient is scheduled to be seen on Tuesday 5/27/2025, 12:15 pm with Dr. Springer. Patient voiced understanding of appointment date, time, and location.    ----- Message from Med Assistant Putnam sent at 4/1/2025  2:14 PM CDT -----  Regarding: FW: Consult    ----- Message -----  From: Una Rowell RN  Sent: 4/1/2025   2:07 PM CDT  To: [facility]  Subject: Consult                                          Hello,Path dx: fibroadenoma, surgical excision recommended due to angular margins on imagingPlease schedule with breast surgery to discuss excisional biopsy.Thank you,CD

## 2025-04-02 NOTE — PROGRESS NOTES
OTOLARYNGOLOGY CLINIC NOTE  Date:  04/02/2025     Chief complaint:  Chief Complaint   Patient presents with    fungal ear infection     Left ear    itchy ears     bilateral    Cerumen Impaction     Small amount bilateral     History of Present Illness  Chantel Alas is a 45 y.o. female  presenting today for a new evaluation and treatment of ear discomfort.  Pt reports about 10 days ago having fullness and pressure feeling toher left ear.  Pt reports about 6 days later it started to hurt and she went to urgent care.  Pt was advised she had a fungal infection.  Pt was prescribed a clotrimazole cream and antibiotic ear drops.  Pt did not receive the cream but did start the antibiotic drops.  Pt has been using them for 3 days.  Pt reports the ear is extremely itchy.  Pt reports her ears itch all the time even when she doesn't have an infection.      Review of medical records and prior documentation  Past medical records were reviewed with data pertinent to the chief complaint summarized in the HPI. Information obtained from review of medical records is attributed to respective sources in the HPI with reference to sources of information at their mention. Records reviewed included all recent notes from referring provider, primary care, and related subspecialty evaluations as available. This review of records was performed and additional data obtained to supplement history obtained from the patient and further inform medical decision making involved in formulating a plan of care accounting for all history and treatment relevant to the issues addressed.    Past Medical History  No past medical history on file.     Past Surgical History  Past Surgical History:   Procedure Laterality Date    DILATION AND CURETTAGE OF UTERUS  2011    ESOPHAGOGASTRODUODENOSCOPY N/A 10/10/2024    Procedure: EGD (ESOPHAGOGASTRODUODENOSCOPY);  Surgeon: Dima Anderson MD;  Location: [facility] ENDOSCOPY;  Service: Endoscopy;  Laterality: N/A;   "7/17/24-Ref SERENA Mcknight NP, instr portal-DS  7/19/24-LVM with new arrival time of 1245pm, updated instr portal-DS  10/2 case moved to Dr. Anderson's schedule due to room closure-lvm for precall-st  10/7 lvm for precall-st  10.9 precall complete; pt schedule    WISDOM TOOTH EXTRACTION  2015      Medications  Medications Ordered Prior to Encounter[1]    Review of Systems  Review of Systems   Constitutional: Negative.    HENT:  Positive for ear discharge and ear pain.    Respiratory: Negative.     Cardiovascular: Negative.    Genitourinary: Negative.    Musculoskeletal: Negative.    Skin: Negative.    Neurological:  Positive for headaches.   Psychiatric/Behavioral:  Positive for depression. The patient is nervous/anxious.       Social History   reports that she has never smoked. She has never used smokeless tobacco. She reports that she does not currently use alcohol after a past usage of about 1.0 standard drink of alcohol per week. She reports that she does not use drugs.     Family History  Family History   Problem Relation Name Age of Onset    Hypertension Mother Mom     Stroke Mother Mom     Hypertension Father Dad     Cancer Maternal Grandmother M         leukemia    Leukemia Maternal Grandmother M     Cancer Maternal Grandfather MG     Cancer Paternal Grandmother PG     Cancer Paternal Grandfather JG     Glaucoma Maternal Aunt      Breast cancer Neg Hx      Colon cancer Neg Hx      Ovarian cancer Neg Hx        Physical Exam   Vitals:    04/02/25 0822   BP: 133/77    Body mass index is 31.84 kg/m².  Weight: 84.1 kg (185 lb 8.3 oz)   Height: 5' 4" (162.6 cm)     GENERAL: no acute distress.  HEAD: normocephalic.   EYES: lids and lashes normal. No scleral icterus  EARS: external ear without lesion, normal pinna shape and position.  External auditory canal  (see procedure note.)      NOSE: external nose without significant bony abnormality  ORAL CAVITY/OROPHARYNX: tongue midline and mobile. Symmetric palate rise.  "   NECK: trachea midline.   LYMPH NODES:No cervical lymphadenopathy.  RESPIRATORY: no stridor, no stertor. Voice normal. Respirations nonlabored.  NEURO: alert, responds to questions appropriately.   Cranial nerve exam as indicated in above sections and additionally showed facial movement symmetric with good eye closure and symmetric smile.   PSYCH:mood appropriate    Procedure Note   Procedure performed: microscopic examination of ear     Indication for procedure: otologic complaints with limited otoscopic exam , need for improved visualization/magnified exam to rule out ear pathology      Description of procedure:  After verbal consent was obtained, the patient was positioned in semi recumbent position and speculum was placed in the right ear and microscope brought into the field. The microscope was positioned and magnification adjusted for appropriate visualization. visualization was performed and at various levels of higher magnification to optimize views of the ear canal, tympanic membrane, ossicles and middle ear space. Suction was used to remove cerumen and fungal debris on the left ; same procedure repeated for opposite ear Findings as indicated below. All portions of the procedure and examination by otomicroscopy were tolerated well without complication.      Findings:    Right external ear canal with dry patchy areas.  Tympanic membrane, no perforation noted, no retraction no fluid  Left external ear canal with thick white to yellow drainage with black spots and wax mixed which was removed revealing inflamed, erythema to canal.  TM intact, no perforation, no retraction, no middle ear fluid.       Imaging:  The patient does not have any pertinent and/or recent imaging of the head and neck.     Labs:  CBC  Recent Labs   Lab 11/13/23  1045 07/23/24  1652 11/16/24  1002   WBC 9.67 10.44 7.27   Hemoglobin 11.9 L 8.8 L 11.7 L   Hematocrit 39.5 30.9 L 39.1   MCV 92 72 L 85   Platelets 303 515 H 355     BMP  Recent  Labs   Lab 11/13/23  1045 07/23/24  1652 11/16/24  1002   Glucose 84 86 66 L   Sodium 140 137 138   Potassium 4.3 3.8 3.9   Chloride 105 103 104   CO2 26 23 20 L   BUN 7 12 10   Creatinine 0.7 0.9 0.8   Calcium 9.4 9.8 9.5   Magnesium  --  2.2  --      Assessment  1. Otitis externa, fungal, left ear    2. Eczema of external ear, bilateral  - clotrimazole-betamethasone 1-0.05% (LOTRISONE) cream; Apply topically 2 (two) times daily. Small amount on qtip and gently roll around ear canal, do not insert far into ear canal for 7 days  Dispense: 1 each; Refill: 2     Plan:  Pt advised to start using cream ordered today in her right ear.  Pt advised script sent to [facility] for ear drops for her to start using twice a day.  Pt given f/u appt for 2 weeks.  Discussed plan of care with patient in detail and all questions answered. Patient reported understanding of plan of care.        [1]   Current Outpatient Medications on File Prior to Visit   Medication Sig Dispense Refill    buPROPion (WELLBUTRIN XL) 150 MG TB24 tablet Take 150 mg by mouth every morning.      busPIRone (BUSPAR) 7.5 MG tablet Take 7.5 mg by mouth 2 (two) times daily.      ciprofloxacin-dexAMETHasone 0.3-0.1% (CIPRODEX) 0.3-0.1 % DrpS Place 4 drops into both ears 2 (two) times daily. for 7 days 7.5 mL 0    clonazePAM (KLONOPIN) 0.5 MG tablet Take 1 tablet (0.5 mg total) by mouth daily as needed for Anxiety (panic attacks). 30 tablet 1    clotrimazole (LOTRIMIN) 1 % Soln Apply topically 2 (two) times daily. for 14 days 30 mL 0    EScitalopram oxalate (LEXAPRO) 10 MG tablet Take 2 tablets (20 mg total) by mouth once daily.      ferrous sulfate (FEOSOL) 325 mg (65 mg iron) Tab tablet Take 1 tablet (325 mg total) by mouth daily with breakfast.  0    fluocinolone acetonide oiL 0.01 % Drop Place 5 drops in ear(s) 2 (two) times a day. 20 mL 2    ibuprofen (ADVIL,MOTRIN) 800 MG tablet Take 1 tablet (800 mg total) by mouth every 6 (six) hours as  needed for Pain. 28 tablet 0    neomycin-polymyxin-hydrocortisone (CORTISPORIN) 3.5-10,000-1 mg/mL-unit/mL-% otic suspension Place 3 drops into both ears 3 (three) times daily. for 7 days 10 mL 0    topiramate (TOPAMAX) 25 MG tablet Take 1 tablet (25 mg total) by mouth once daily. 30 tablet 11    hydrocortisone 2.5 % cream Apply topically 2 (two) times daily. for 5 days 20 g 0     No current facility-administered medications on file prior to visit.

## 2025-04-16 ENCOUNTER — OFFICE VISIT (OUTPATIENT)
Dept: OTOLARYNGOLOGY | Facility: CLINIC | Age: 45
End: 2025-04-16
Payer: COMMERCIAL

## 2025-04-16 VITALS
SYSTOLIC BLOOD PRESSURE: 108 MMHG | DIASTOLIC BLOOD PRESSURE: 74 MMHG | BODY MASS INDEX: 31.81 KG/M2 | WEIGHT: 185.31 LBS | HEART RATE: 86 BPM

## 2025-04-16 DIAGNOSIS — H62.42 OTITIS EXTERNA, FUNGAL, LEFT EAR: Primary | ICD-10-CM

## 2025-04-16 DIAGNOSIS — B36.9 OTITIS EXTERNA, FUNGAL, LEFT EAR: Primary | ICD-10-CM

## 2025-04-16 PROCEDURE — 3008F BODY MASS INDEX DOCD: CPT | Mod: CPTII,S$GLB,, | Performed by: NURSE PRACTITIONER

## 2025-04-16 PROCEDURE — 99213 OFFICE O/P EST LOW 20 MIN: CPT | Mod: S$GLB,,, | Performed by: NURSE PRACTITIONER

## 2025-04-16 PROCEDURE — 3078F DIAST BP <80 MM HG: CPT | Mod: CPTII,S$GLB,, | Performed by: NURSE PRACTITIONER

## 2025-04-16 PROCEDURE — 3074F SYST BP LT 130 MM HG: CPT | Mod: CPTII,S$GLB,, | Performed by: NURSE PRACTITIONER

## 2025-04-16 NOTE — PROGRESS NOTES
OTOLARYNGOLOGY CLINIC NOTE  Date:  05/09/2025     Chief complaint:  Chief Complaint   Patient presents with    Follow-up     Pt reports improvement     History of Present Illness  Chantel Alas is a 45 y.o. female  presenting today for a otitis externa f/u.  Pt reports she has been using the drops as ordered at her last visit.  Pt reports her ear is feeling much better.  Pt is no longer having the extreme itching, fullness or pressure.      Notes from previous visit on 4/2/2025:  Chantel Alas is a 45 y.o. female  presenting today for a new evaluation and treatment of ear discomfort.  Pt reports about 10 days ago having fullness and pressure feeling toher left ear.  Pt reports about 6 days later it started to hurt and she went to urgent care.  Pt was advised she had a fungal infection.  Pt was prescribed a clotrimazole cream and antibiotic ear drops.  Pt did not receive the cream but did start the antibiotic drops.  Pt has been using them for 3 days.  Pt reports the ear is extremely itchy.  Pt reports her ears itch all the time even when she doesn't have an infection.      Review of medical records and prior documentation  Past medical records were reviewed with data pertinent to the chief complaint summarized in the HPI. Information obtained from review of medical records is attributed to respective sources in the HPI with reference to sources of information at their mention. Records reviewed included all recent notes from referring provider, primary care, and related subspecialty evaluations as available. This review of records was performed and additional data obtained to supplement history obtained from the patient and further inform medical decision making involved in formulating a plan of care accounting for all history and treatment relevant to the issues addressed.    Past Medical History  No past medical history on file.     Past Surgical History  Past Surgical History:   Procedure Laterality Date    DILATION  AND CURETTAGE OF UTERUS  2011    ESOPHAGOGASTRODUODENOSCOPY N/A 10/10/2024    Procedure: EGD (ESOPHAGOGASTRODUODENOSCOPY);  Surgeon: Dima Anderson MD;  Location: [facility] ENDOSCOPY;  Service: Endoscopy;  Laterality: N/A;  7/17/24-Ref SERENA Mcknight NP, instr portal-DS  7/19/24-LVM with new arrival time of 1245pm, updated instr portal-DS  10/2 case moved to Dr. Anderson's schedule due to room closure-lvm for precall-st  10/7 lvm for precall-st  10.9 precall complete; pt schedule    WISDOM TOOTH EXTRACTION  2015      Medications  Medications Ordered Prior to Encounter[1]    Review of Systems  Review of Systems   Constitutional: Negative.    HENT: Negative.     Eyes: Negative.    Respiratory: Negative.     Cardiovascular: Negative.    Gastrointestinal: Negative.    Genitourinary: Negative.    Musculoskeletal:  Positive for back pain.   Skin: Negative.    Neurological:  Positive for dizziness and headaches.   Psychiatric/Behavioral:  Positive for depression. The patient is nervous/anxious.       Social History   reports that she has never smoked. She has never used smokeless tobacco. She reports that she does not currently use alcohol after a past usage of about 1.0 standard drink of alcohol per week. She reports that she does not use drugs.     Family History  Family History   Problem Relation Name Age of Onset    Hypertension Mother Mom     Stroke Mother Mom     Hypertension Father Dad     Cancer Maternal Grandmother M         leukemia    Leukemia Maternal Grandmother M     Cancer Maternal Grandfather MG     Cancer Paternal Grandmother PG     Cancer Paternal Grandfather JG     Glaucoma Maternal Aunt      Breast cancer Neg Hx      Colon cancer Neg Hx      Ovarian cancer Neg Hx        Physical Exam   Vitals:    04/16/25 1530   BP: 108/74   Pulse: 86    Body mass index is 31.81 kg/m².  Weight: 84.1 kg (185 lb 4.8 oz)       GENERAL: no acute distress.  HEAD: normocephalic.   EYES: lids and lashes normal. No scleral  icterus  EARS: external ear without lesion, normal pinna shape and position.  External auditory canal with normal cerumen, tympanic membrane fully visible, no perforation , no retraction. No middle ear effusion. Ossicles intact.   NOSE: external nose without significant bony abnormality  ORAL CAVITY/OROPHARYNX: tongue midline and mobile. Symmetric palate rise. Uvula midline.   NECK: trachea midline.   LYMPH NODES:No cervical lymphadenopathy.  RESPIRATORY: no stridor, no stertor. Voice normal. Respirations nonlabored.  NEURO: alert, responds to questions appropriately.   Cranial nerve exam as indicated in above sections and additionally showed facial movement symmetric with good eye closure and symmetric smile.   PSYCH:mood appropriate    Imaging:  The patient does not have any pertinent and/or recent imaging of the head and neck.     Labs:  CBC  Recent Labs   Lab 11/13/23  1045 07/23/24  1652 11/16/24  1002   WBC 9.67 10.44 7.27   Hemoglobin 11.9 L 8.8 L 11.7 L   Hematocrit 39.5 30.9 L 39.1   MCV 92 72 L 85   Platelets 303 515 H 355     BMP  Recent Labs   Lab 11/13/23  1045 07/23/24  1652 11/16/24  1002   Glucose 84 86 66 L   Sodium 140 137 138   Potassium 4.3 3.8 3.9   Chloride 105 103 104   CO2 26 23 20 L   BUN 7 12 10   Creatinine 0.7 0.9 0.8   Calcium 9.4 9.8 9.5   Magnesium  --  2.2  --      Assessment  1. Otitis externa, fungal, left ear     Plan:  Pt condition has resolved.  Pt advised to rtc if symptoms return and prn.  Discussed plan of care with patient in detail and all questions answered. Patient reported understanding of plan of care.        [1]   Current Outpatient Medications on File Prior to Visit   Medication Sig Dispense Refill    buPROPion (WELLBUTRIN XL) 150 MG TB24 tablet Take 150 mg by mouth every morning.      busPIRone (BUSPAR) 7.5 MG tablet Take 7.5 mg by mouth 2 (two) times daily.      clonazePAM (KLONOPIN) 0.5 MG tablet Take 1 tablet (0.5 mg total) by mouth daily as needed for Anxiety  (panic attacks). 30 tablet 1    clotrimazole (LOTRIMIN) 1 % Soln Apply topically 2 (two) times daily. for 14 days 30 mL 0    EScitalopram oxalate (LEXAPRO) 10 MG tablet Take 2 tablets (20 mg total) by mouth once daily.      ferrous sulfate (FEOSOL) 325 mg (65 mg iron) Tab tablet Take 1 tablet (325 mg total) by mouth daily with breakfast.  0    fluocinolone acetonide oiL 0.01 % Drop Place 5 drops in ear(s) 2 (two) times a day. 20 mL 2    hydrocortisone 2.5 % cream Apply topically 2 (two) times daily. for 5 days 20 g 0    topiramate (TOPAMAX) 25 MG tablet Take 1 tablet (25 mg total) by mouth once daily. 30 tablet 11     No current facility-administered medications on file prior to visit.

## 2025-04-29 ENCOUNTER — TELEPHONE (OUTPATIENT)
Dept: ENDOSCOPY | Facility: HOSPITAL | Age: 45
End: 2025-04-29

## 2025-04-29 NOTE — TELEPHONE ENCOUNTER
Called patient x3 to schedule Colonoscopy.  At last got patient on the line but stated she could not hear me and got disconnected.

## 2025-05-02 ENCOUNTER — OFFICE VISIT (OUTPATIENT)
Dept: OPTOMETRY | Facility: CLINIC | Age: 45
End: 2025-05-02
Payer: COMMERCIAL

## 2025-05-02 DIAGNOSIS — H52.203 ASTIGMATISM OF BOTH EYES WITH PRESBYOPIA: Primary | ICD-10-CM

## 2025-05-02 DIAGNOSIS — H52.4 ASTIGMATISM OF BOTH EYES WITH PRESBYOPIA: Primary | ICD-10-CM

## 2025-05-02 DIAGNOSIS — H40.023 OPEN ANGLE WITH BORDERLINE FINDINGS AND HIGH GLAUCOMA RISK IN BOTH EYES: ICD-10-CM

## 2025-05-02 PROCEDURE — 99999 PR PBB SHADOW E&M-EST. PATIENT-LVL III: CPT | Mod: PBBFAC,,, | Performed by: OPTOMETRIST

## 2025-05-02 NOTE — PROGRESS NOTES
HPI    ZELDA: 4/11/2024  Chief complaint (CC): patient here for routine check , no concerns at this   time   Glasses? +  Contacts? -  H/o eye surgery, injections or laser: -  H/o eye injury: -  Known eye conditions? -  Family h/o eye conditions? -  Eye gtts? -      (-) Flashes (-)  Floaters (-) Mucous   (-)  Tearing (-) Itching (-) Burning   (-) Headaches (-) Eye Pain/discomfort (-) Irritation   (-)  Redness (-) Double vision (-) Blurry vision    Diabetic? -  A1c? Hemoglobin A1C       Date                     Value               Ref Range             Status                11/16/2024               5.1                 4.0 - 5.6 %           Final              Last edited by Faisal Dwyer on 5/2/2025  2:54 PM.            Assessment /Plan     For exam results, see Encounter Report.    Astigmatism of both eyes with presbyopia    Open angle with borderline findings and high glaucoma risk in both eyes  -     Rosa Visual Field - OU - Extended - Both Eyes; Future; Expected date: 05/02/2026  -     Posterior Segment OCT Optic Nerve- Both eyes; Future; Expected date: 05/02/2026      1. Pt happy w spex  2. glauc susp by CDs--see OCT (RNFL wnl OU).  iop wnl without meds. Last VF wnl OU.  -fam hx thought in past GF had it, but found out that was not true).  Angles open by gonio OU.  Pach: 547/588.  Doubt glauc now--will monitor        PLAN:    rtc 1 yr: F 24-2 sf/OCT/full

## 2025-05-15 ENCOUNTER — TELEPHONE (OUTPATIENT)
Dept: ENDOSCOPY | Facility: HOSPITAL | Age: 45
End: 2025-05-15

## 2025-05-15 ENCOUNTER — CLINICAL SUPPORT (OUTPATIENT)
Dept: ENDOSCOPY | Facility: HOSPITAL | Age: 45
End: 2025-05-15
Attending: HOSPITALIST
Payer: COMMERCIAL

## 2025-05-15 ENCOUNTER — PATIENT MESSAGE (OUTPATIENT)
Dept: ENDOSCOPY | Facility: HOSPITAL | Age: 45
End: 2025-05-15

## 2025-05-15 DIAGNOSIS — Z12.12 SCREENING FOR COLORECTAL CANCER: ICD-10-CM

## 2025-05-15 DIAGNOSIS — Z12.11 SCREENING FOR COLORECTAL CANCER: ICD-10-CM

## 2025-05-15 NOTE — TELEPHONE ENCOUNTER
Patient is scheduled for a Colonoscopy on 6/2/25 with Dr. nuñez  Referral for procedure from PAT appointment

## 2025-05-26 ENCOUNTER — TELEPHONE (OUTPATIENT)
Dept: ENDOSCOPY | Facility: HOSPITAL | Age: 45
End: 2025-05-26
Payer: COMMERCIAL

## 2025-05-26 NOTE — TELEPHONE ENCOUNTER
Pt scheduled with [facility] 6-2; pt requested female MD; notified pt of availability 6-3 with Dr. Ryan; pt confirmed - requested miralax prep; sent via portal

## 2025-06-02 ENCOUNTER — PATIENT MESSAGE (OUTPATIENT)
Dept: ENDOSCOPY | Facility: HOSPITAL | Age: 45
End: 2025-06-02
Payer: COMMERCIAL

## 2025-06-16 ENCOUNTER — TELEPHONE (OUTPATIENT)
Dept: ENDOSCOPY | Facility: HOSPITAL | Age: 45
End: 2025-06-16
Payer: COMMERCIAL

## 2025-06-16 NOTE — TELEPHONE ENCOUNTER
Called patient to confirm endoscopy appointment on 6/17/2025. Patient did not answer. Left voicemail with callback number.     Patient presents today for a follow-up office visit from 11/29/2022. He is continues taking Rabeprazole 20 mg daily and Famotidine 20 mg at bedtime. Patient denies any current heartburn or dysphagia. He denies regurgitation.   Refill RABEprazole Sodium Tablet Delayed Release, 20 MG, 1 tablet, Orally, Once a day, 90 days, 90 Tablet, Refills 3 Continue Famotidine Tablet, 20 MG, 1 tablet, Orally, at bedtime Notes: Plan/Recommendations: 1) Reduce amitriptyline to 25 mg at bedtime - possible further wean to follow. Stop Sertraline. Will likely try Duloxetine after weaning off amitriptyline. 2) Continue Famotidine 20 mg once daily at night. 3) Repeat Colonoscopy for polyp surveillance in 2 to 3 years. 4) FODMAP diet. 5) Continue Rabeprazole 20 mg daily in am. 6) Avoid overeating. 7) May consider another trial of Xifaxan for IBS-D in the future. 8) SIBO testing. Consider fructose toleration testing. 9) Continue

## 2025-07-03 ENCOUNTER — OFFICE VISIT (OUTPATIENT)
Dept: URGENT CARE | Facility: CLINIC | Age: 45
End: 2025-07-03
Payer: COMMERCIAL

## 2025-07-03 VITALS
RESPIRATION RATE: 16 BRPM | OXYGEN SATURATION: 96 % | SYSTOLIC BLOOD PRESSURE: 118 MMHG | BODY MASS INDEX: 31.58 KG/M2 | TEMPERATURE: 99 F | HEART RATE: 76 BPM | WEIGHT: 185 LBS | HEIGHT: 64 IN | DIASTOLIC BLOOD PRESSURE: 82 MMHG

## 2025-07-03 DIAGNOSIS — H60.591 OTHER NONINFECTIVE ACUTE OTITIS EXTERNA OF RIGHT EAR: ICD-10-CM

## 2025-07-03 DIAGNOSIS — H92.01 ACUTE OTALGIA, RIGHT: Primary | ICD-10-CM

## 2025-07-03 RX ORDER — NEOMYCIN SULFATE, POLYMYXIN B SULFATE AND HYDROCORTISONE 10; 3.5; 1 MG/ML; MG/ML; [USP'U]/ML
3 SUSPENSION/ DROPS AURICULAR (OTIC) 4 TIMES DAILY
Qty: 10 ML | Refills: 0 | Status: SHIPPED | OUTPATIENT
Start: 2025-07-03 | End: 2025-07-10

## 2025-07-03 RX ORDER — KETOROLAC TROMETHAMINE 10 MG/1
10 TABLET, FILM COATED ORAL EVERY 6 HOURS PRN
Qty: 16 TABLET | Refills: 0 | Status: SHIPPED | OUTPATIENT
Start: 2025-07-04 | End: 2025-07-08

## 2025-07-03 RX ORDER — LEVOFLOXACIN 500 MG/1
500 TABLET, FILM COATED ORAL DAILY
Qty: 7 TABLET | Refills: 0 | Status: SHIPPED | OUTPATIENT
Start: 2025-07-03 | End: 2025-07-10

## 2025-07-03 RX ORDER — KETOROLAC TROMETHAMINE 30 MG/ML
30 INJECTION, SOLUTION INTRAMUSCULAR; INTRAVENOUS
Status: COMPLETED | OUTPATIENT
Start: 2025-07-03 | End: 2025-07-03

## 2025-07-03 RX ADMIN — KETOROLAC TROMETHAMINE 30 MG: 30 INJECTION, SOLUTION INTRAMUSCULAR; INTRAVENOUS at 12:07

## 2025-07-03 NOTE — PROGRESS NOTES
"Subjective:      Patient ID: Chantel Alas is a 45 y.o. female.    Vitals:  height is 5' 4" (1.626 m) and weight is 83.9 kg (185 lb). Her oral temperature is 98.8 °F (37.1 °C). Her blood pressure is 118/82 and her pulse is 76. Her respiration is 16 and oxygen saturation is 96%.     Chief Complaint: Ear Fullness    Chantel Alas is a 45 y.o. female who complains of  RT Ear fullness. Sx started 2 days ago and are unchanged.     Sx include RT side otalgia (5/10, throbbing), RT ear fullness and pressure, tinnitus    Pt has taken ibuprofen for sx with mild relief.       Patient has hx of otomycosis and acute eczematoid OM.      Ear Fullness   There is pain in the right ear. This is a new problem. The current episode started in the past 7 days. The problem occurs constantly. The problem has been unchanged. There has been no fever. The pain is at a severity of 5/10. The pain is moderate. Associated symptoms include hearing loss. Pertinent negatives include no ear discharge or sore throat. She has tried NSAIDs for the symptoms. The treatment provided mild relief.     Constitution: Negative for chills, fatigue, fever and generalized weakness.   HENT:  Positive for ear pain and hearing loss. Negative for ear discharge, foreign body in ear, tinnitus, congestion, postnasal drip, sinus pain, sinus pressure, sore throat, trouble swallowing and voice change.    Allergic/Immunologic: Positive for eczema and itching. Negative for environmental allergies, seasonal allergies and food allergies.      Objective:     Physical Exam   Constitutional: She is oriented to person, place, and time. She is cooperative.      Comments:Patient is awake and alert, sitting up in exam chair, speaking and answering in complete sentences     normal  HENT:   Head: Normocephalic and atraumatic.   Ears:   Right Ear: External ear normal. There is swelling and tenderness. No no drainage. A middle ear effusion is present.   Left Ear: Tympanic membrane and " external ear normal.      Comments: Eczematous ear canal of left ear; tenderness with manipulation to right ear; pain and tenderness to tragus and preauricular area of right ear  Nose: Nose normal.   Mouth/Throat: Mucous membranes are moist. Oropharynx is clear.   Eyes: Conjunctivae are normal. Pupils are equal, round, and reactive to light. Extraocular movement intact   Neck: Neck supple.   Cardiovascular: Normal rate, regular rhythm and normal heart sounds.   Pulmonary/Chest: Effort normal and breath sounds normal.   Abdominal: Normal appearance.   Neurological: She is alert and oriented to person, place, and time.   Skin: Skin is warm.   Psychiatric: Her behavior is normal. Mood, judgment and thought content normal.   Nursing note and vitals reviewed.      Assessment:     1. Acute otalgia, right    2. Other noninfective acute otitis externa of right ear      Patient presents with clinical exam findings and history consistent with above.      On exam, patient is nontoxic appearing and vitals are stable.      Diagnostic testing results were reviewed and discussed with patient/guardian.   Tests ordered in clinic: None  Previous progress notes/admissions/labs and medications were reviewed.  Reviewed GFR > 60 from CMP on 11/16/24.     Plan:   Patient was followed by DMITRY Stephenson from ENT.    Acute otalgia, right  -     ketorolac injection 30 mg  -     levoFLOXacin (LEVAQUIN) 500 MG tablet; Take 1 tablet (500 mg total) by mouth once daily. for 7 days  Dispense: 7 tablet; Refill: 0  -     neomycin-polymyxin-hydrocortisone (CORTISPORIN) 3.5-10,000-1 mg/mL-unit/mL-% otic suspension; Place 3 drops into the left ear 4 (four) times daily. for 7 days  Dispense: 10 mL; Refill: 0  -     Ambulatory referral/consult to ENT  -     ketorolac (TORADOL) 10 mg tablet; Take 1 tablet (10 mg total) by mouth every 6 (six) hours as needed for Pain.  Dispense: 16 tablet; Refill: 0    Other noninfective acute otitis externa of right  "ear  -     levoFLOXacin (LEVAQUIN) 500 MG tablet; Take 1 tablet (500 mg total) by mouth once daily. for 7 days  Dispense: 7 tablet; Refill: 0  -     neomycin-polymyxin-hydrocortisone (CORTISPORIN) 3.5-10,000-1 mg/mL-unit/mL-% otic suspension; Place 3 drops into the left ear 4 (four) times daily. for 7 days  Dispense: 10 mL; Refill: 0  -     Ambulatory referral/consult to ENT                  1) See orders for this visit as documented in the electronic medical record.  2) Symptomatic therapy suggested: use acetaminophen/ibuprofen every 6-8 hours prn pain or fever, push fluids.   3) Call or return to clinic prn if these symptoms worsen or fail to improve as anticipated.    Discussed results/diagnosis/plan with patient in clinic.  We had shared decision making for patient's treatment. Patient verbalized understanding and in agreement with current treatment plan.     Patient was instructed to return for re-evaluation with urgent care or PCP for continued outpatient workup and management if symptoms do not improve/worsening symptoms. Strict ED versus clinic precautions given in depth.    Discharge and follow-up instructions given verbally/printed with the patient who expressed understanding. The [facility] and [facility] on [facility]              [facility] "Analisa Schwarz PA-C          Patient Instructions   Discussed with patient that if he/she is in pain today, only take tylenol due to toradol injection received in clinic. You can continue NSAIDS tomorrow such as ibuprofen (Motrin/Advil), naproxen (Aleve), Mobic (Meloxicam), ketorolac (Toradol).     If not allergic, take Tylenol (Acetaminophen) 650 mg to  1 g every 6 hours as needed for fever/pain and/or Motrin (Ibuprofen) 600 to 800 mg every 6 hours as needed for pain and/or fever or toradol 10 mg every 6 hours as needed for pain/fever      Please remember that you have received care at an urgent care today. Urgent cares are not emergency rooms and are " not equipped to handle life threatening emergencies and cannot rule in or out certain medical conditions and you may be released before all of your medical problems are known or treated. Please arrange follow up with your primary care physician or speciality clinic  within 2-5 days if your signs and symptoms have not resolved or worsen. Patient can call our Referral Hotline at [phone] to make an appointment.    Please return here or go to the Emergency Department for any concerns or worsening of condition.Patient was educated on signs/symptoms that would warrant emergent medical attention. Patient verbalized understanding.  Your symptoms are not getting better in 2 to 3 days.  You continue to have problems hearing after 2 to 3 weeks.  You have a fever of 100.4°F (38°C) or higher or chills.  You have discharge or fluid coming from your ear.

## 2025-07-03 NOTE — PATIENT INSTRUCTIONS
Discussed with patient that if he/she is in pain today, only take tylenol due to toradol injection received in clinic. You can continue NSAIDS tomorrow such as ibuprofen (Motrin/Advil), naproxen (Aleve), Mobic (Meloxicam), ketorolac (Toradol).     If not allergic, take Tylenol (Acetaminophen) 650 mg to  1 g every 6 hours as needed for fever/pain and/or Motrin (Ibuprofen) 600 to 800 mg every 6 hours as needed for pain and/or fever or toradol 10 mg every 6 hours as needed for pain/fever      Please remember that you have received care at an urgent care today. Urgent cares are not emergency rooms and are not equipped to handle life threatening emergencies and cannot rule in or out certain medical conditions and you may be released before all of your medical problems are known or treated. Please arrange follow up with your primary care physician or speciality clinic  within 2-5 days if your signs and symptoms have not resolved or worsen. Patient can call our Referral Hotline at [phone] to make an appointment.    Please return here or go to the Emergency Department for any concerns or worsening of condition.Patient was educated on signs/symptoms that would warrant emergent medical attention. Patient verbalized understanding.  Your symptoms are not getting better in 2 to 3 days.  You continue to have problems hearing after 2 to 3 weeks.  You have a fever of 100.4°F (38°C) or higher or chills.  You have discharge or fluid coming from your ear.

## 2025-07-03 NOTE — LETTER
"  July 3, 2025      [facility] [place]  [place] [address]  Phone: [phone]  Fax: [phone]       Patient: Chantel Alas   YOB: 1980  Date of Visit: 07/03/2025    To Whom It May Concern:    Carole Alas  was at [facility] on 07/03/2025. The patient may return to work/school on 7/4/25 with no restrictions. If you have any questions or concerns, or if I can be of further assistance, please do not hesitate to contact me.    Sincerely,          Patience Schwarz PA-C (Jackie)       "

## 2025-07-05 ENCOUNTER — NURSE TRIAGE (OUTPATIENT)
Dept: ADMINISTRATIVE | Facility: CLINIC | Age: 45
End: 2025-07-05
Payer: COMMERCIAL

## 2025-07-05 ENCOUNTER — PATIENT OUTREACH (OUTPATIENT)
Facility: OTHER | Age: 45
End: 2025-07-05
Payer: COMMERCIAL

## 2025-07-05 ENCOUNTER — OCHSNER VIRTUAL EMERGENCY DEPARTMENT (OUTPATIENT)
Facility: CLINIC | Age: 45
End: 2025-07-05
Payer: COMMERCIAL

## 2025-07-05 ENCOUNTER — PATIENT MESSAGE (OUTPATIENT)
Dept: OTOLARYNGOLOGY | Facility: CLINIC | Age: 45
End: 2025-07-05
Payer: COMMERCIAL

## 2025-07-05 DIAGNOSIS — H92.09 OTALGIA, UNSPECIFIED LATERALITY: Primary | ICD-10-CM

## 2025-07-05 NOTE — PLAN OF CARE-OVED
[facility] Emergency Department Plan of Care Note  Referral Source: Nurse On-Call                               Chief Complaint   Patient presents with    Otalgia     46 yo female with PMhx of fungal otitis externa in the past, eczema of external ear bilateral, anxiety, depression, obesity was seen at urgent care Thursday and treated with antibiotics buy mouth and ear drops and toradol for otitis externa. She has not had relief of pain and she is now having very mild sensation change to the left side of her face and a headache. Pain 6-7/10. No fever and no stiff neck. Denies rash. Reports a mild numbing feeling, no pins/needles. No facial droop.     Recommendation: Specialist Referral         Specialty: ENT               Take acetaminophen 1000 mg now as well for pain. If the numbness does not resolve with that, or worsens, any facial droop, rash, continue headache or any other concerns she should go to the ED today. Ear infections rarely can effect the facial nerve so low threshold if acetaminophen does not resolve the headache and sensation change to be seen today. ENT on Monday either way, she has had hard to treat ear infections in the past.     3/30/25 treated with ciprodex, lotrimin topical, cortisporin topical. Followed up with ENT 4/2 and given clotrimazole-betamethasone topical external ear only and symptoms resolved.     Seen yesterday at  with eczema to external ear, mild right ear effusion, sent home with levaquin, toradol, and cortisporin otic drops. ENT referred and scheduled for 7/23.     Future Appointments   Date Time Provider Department Center   7/7/2025  8:45 AM Denise Bay MD [facility] ENT [facility]   7/23/2025  9:30 AM Bryant Bonds FNP [facility] ENT [facility]   11/17/2025  3:00 PM Doretha Hernandes MD [facility] [place]   12/22/2025  9:15 AM Vero Wilkins MD [facility]         Encounter Diagnosis   Name Primary?    Otalgia, unspecified laterality Yes

## 2025-07-05 NOTE — PROGRESS NOTES
"Patient contacted on call nurse, Reports "seen at urgent care Thursday and treated with antibiotics buy mouth and drops and toradol. She has not had relief of pain and she is now having numbness to the left side of her face and a headache. Pain 6-7/10. No fever and no stiff neck." Consulted Dr. Peterson, scheduled with ENT Denise Bay MD 7/7/25, Pieter to follow up 7/7/25  "

## 2025-07-05 NOTE — TELEPHONE ENCOUNTER
"Patient seen at urgent care Thursday and treated with antibiotics by mouth and ear drops and toradol. She has not had relief of pain and she is now having numbness to the left side of her face and a headache. Pain 6-7/10. No fever and no stiff neck. Disposition provided - see pcp within 24 hours. Unable to schedule within time frame. Sent secure chat to [facility] and chart reviewed by Dr. Peterson. Appointment made with ENT for Monday morning and she advised...."She can take acetaminophen 1000 mg now as well for pain. If the numbness does not resolve with that, or worsens, any facial droop or any other concerns she should go to the ED before that. Ear infections rarely can effect the facial nerve so low threshold if acetaminophen does not resolve the numbness to be seen today.". Updated patient. Instructed to call back with additional questions or worsening of symptoms. Patient verbalized understanding.      Reason for Disposition   [1] Taking antibiotic (ear drops or pills) > 72 hours (3 days) AND [2] ear PAIN NOT BETTER or recurs    Additional Information   Negative: [1] Stiff neck (can't touch chin to chest) AND [2] fever   Negative: [1] Stiff neck (can't touch chin to chest) AND [2] headache   Negative: Bony area of skull behind the ear is pink or swollen   Negative: Patient sounds very sick or weak to the triager   Negative: [1] SEVERE pain AND [2] not improved 2 hours after pain medicine   Negative: Fever > 100.4 F (38.0 C)   Negative: Redness or swelling of outer ear (ear lobe)   Negative: [1] Stiff neck (can't touch chin to chest) AND [2] no fever or headache    Protocols used: Ear - Otitis Externa Follow-up Call-A-AH    "

## 2025-07-06 ENCOUNTER — OFFICE VISIT (OUTPATIENT)
Dept: URGENT CARE | Facility: CLINIC | Age: 45
End: 2025-07-06
Payer: COMMERCIAL

## 2025-07-06 VITALS
OXYGEN SATURATION: 98 % | SYSTOLIC BLOOD PRESSURE: 127 MMHG | RESPIRATION RATE: 16 BRPM | HEIGHT: 64 IN | DIASTOLIC BLOOD PRESSURE: 79 MMHG | TEMPERATURE: 99 F | BODY MASS INDEX: 31.58 KG/M2 | WEIGHT: 185 LBS | HEART RATE: 83 BPM

## 2025-07-06 DIAGNOSIS — B36.9 OTOMYCOSIS: Primary | ICD-10-CM

## 2025-07-06 DIAGNOSIS — H92.01 EAR PAIN, REFERRED, RIGHT: ICD-10-CM

## 2025-07-06 DIAGNOSIS — H62.40 OTOMYCOSIS: Primary | ICD-10-CM

## 2025-07-06 PROCEDURE — 99213 OFFICE O/P EST LOW 20 MIN: CPT | Mod: 25,S$GLB,,

## 2025-07-06 PROCEDURE — 96372 THER/PROPH/DIAG INJ SC/IM: CPT | Mod: 59,S$GLB,, | Performed by: FAMILY MEDICINE

## 2025-07-06 RX ORDER — CLOTRIMAZOLE AND BETAMETHASONE DIPROPIONATE 10; .64 MG/G; MG/G
CREAM TOPICAL 2 TIMES DAILY
Qty: 15 G | Refills: 0 | Status: SHIPPED | OUTPATIENT
Start: 2025-07-06

## 2025-07-06 RX ORDER — FLUCONAZOLE 150 MG/1
150 TABLET ORAL ONCE
Qty: 1 TABLET | Refills: 0 | Status: SHIPPED | OUTPATIENT
Start: 2025-07-06 | End: 2025-07-06

## 2025-07-06 RX ORDER — KETOROLAC TROMETHAMINE 30 MG/ML
30 INJECTION, SOLUTION INTRAMUSCULAR; INTRAVENOUS
Status: COMPLETED | OUTPATIENT
Start: 2025-07-06 | End: 2025-07-06

## 2025-07-06 RX ADMIN — KETOROLAC TROMETHAMINE 30 MG: 30 INJECTION, SOLUTION INTRAMUSCULAR; INTRAVENOUS at 10:07

## 2025-07-06 NOTE — TELEPHONE ENCOUNTER
"Pt went to UC last Thursday for ear infection. Today she started having numbness to her jaw initially and it is now spread to her left side face,  left side of head and scalp. She has feeling in these areas but also says it feels numb when she touches it. "Not completely numb but numbing and painful". She had a call with another triager with [facility] today. During that call a referral was made to ENT along with an appt next week. She was advised by FAIZAN that she go to ER for worsening symptoms in the mean time. I reconfirmed that advice and explained to pt that clinics and UC are closed at this time. Advised if her symptoms are worse or not improving, go to ER. PT VU.   Reason for Disposition   Caller has already spoken with another triager or doctor (or NP/PA, pharmacist) AND has further questions AND triager able to answer questions.    Additional Information   Caller has already spoken to PCP (doctor or NP/PA) or another triager    Protocols used: No Contact or Duplicate Contact Call-A-[facility], Information Only Call - No Triage-A-AH    "

## 2025-07-06 NOTE — PROGRESS NOTES
"Subjective:      Patient ID: Chantel Alas is a 45 y.o. female.    Vitals:  height is 5' 4" (1.626 m) and weight is 83.9 kg (185 lb). Her oral temperature is 98.6 °F (37 °C). Her blood pressure is 127/79 and her pulse is 83. Her respiration is 16 and oxygen saturation is 98%.     Chief Complaint: Otalgia    45-year-old female  presents to the clinic today with chief complaint of right ear pain. Symptoms started 7 days ago and have worsened.  Patient was seen on 7/3/25 and was given Levaquin and cortisporin ear drops with no relief.  She notes that the pain is worse and it makes it hard for her to sleep. She feels like the pain is traveling to the right side of her face.   Denies any other URI sx.  Denies fever, chills, body aches, chest pain, shortness of breath, wheezing, abdominal pain, nausea, vomiting, diarrhea, or rashes.      Otalgia   There is pain in the right ear. This is a new problem. The current episode started in the past 7 days. The problem has been gradually worsening. There has been no fever. The pain is at a severity of 7/10. Pertinent negatives include no abdominal pain, coughing, diarrhea, ear discharge, headaches, hearing loss, neck pain, rash, rhinorrhea, sore throat or vomiting. She has tried ear drops and antibiotics for the symptoms. The treatment provided no relief.     Constitution: Negative for activity change, appetite change, chills, sweating, fatigue, fever, generalized weakness and international travel in last 60 days.   HENT:  Positive for ear pain. Negative for ear discharge, foreign body in ear, tinnitus, hearing loss, congestion, postnasal drip, sinus pain, sinus pressure, sore throat and trouble swallowing.    Neck: Negative for neck pain.   Cardiovascular:  Negative for chest pain.   Respiratory:  Negative for chest tightness, cough, sputum production, shortness of breath, wheezing and asthma.    Gastrointestinal:  Negative for abdominal pain, nausea, vomiting and diarrhea. "   Musculoskeletal:  Negative for muscle ache.   Skin:  Negative for rash and erythema.   Allergic/Immunologic: Negative for environmental allergies, seasonal allergies and asthma.   Neurological:  Negative for headaches.      Objective:     Physical Exam   Constitutional: She is oriented to person, place, and time. She appears well-developed.   HENT:   Head: Normocephalic and atraumatic. Head is without abrasion, without contusion and without laceration.   Ears:   Right Ear: Hearing and external ear normal. There is drainage and tenderness. No swelling.   Left Ear: Hearing, tympanic membrane, external ear and ear canal normal.   Nose: Nose normal. No mucosal edema, rhinorrhea or purulent discharge.   Mouth/Throat: Uvula is midline, oropharynx is clear and moist and mucous membranes are normal. No oropharyngeal exudate, posterior oropharyngeal edema, posterior oropharyngeal erythema, tonsillar abscesses or cobblestoning. Tonsils are 0 on the right. Tonsils are 0 on the left. No tonsillar exudate.   Unable to get clear visual of right TM due to white coating and discharge throughout canal and on top of TM with black specks scattered throughout coating and ear canal.       Comments: Unable to get clear visual of right TM due to white coating and discharge throughout canal and on top of TM with black specks scattered throughout coating and ear canal.   Eyes: EOM and lids are normal. Extraocular movement intact   Neck: Trachea normal and phonation normal. Neck supple.   Cardiovascular: Normal rate.   Pulmonary/Chest: Effort normal. No respiratory distress.   Musculoskeletal: Normal range of motion.         General: Normal range of motion.   Lymphadenopathy:     She has no cervical adenopathy.   Neurological: She is alert and oriented to person, place, and time.   Skin: Skin is warm, dry, intact and no rash. Capillary refill takes less than 2 seconds. No abrasion, No burn, No bruising, No erythema and No ecchymosis    Psychiatric: Her speech is normal and behavior is normal. Judgment and thought content normal.   Nursing note and vitals reviewed.      Assessment:     1. Otomycosis    2. Ear pain, referred, right        Plan:       Otomycosis  -     clotrimazole-betamethasone 1-0.05% (LOTRISONE) cream; Apply topically 2 (two) times daily.  Dispense: 15 g; Refill: 0  -     fluconazole (DIFLUCAN) 150 MG Tab; Take 1 tablet (150 mg total) by mouth once. May repeat in 1 week if not improved for 1 dose  Dispense: 1 tablet; Refill: 0    Ear pain, referred, right  -     ketorolac injection 30 mg

## 2025-07-06 NOTE — PATIENT INSTRUCTIONS
Please drink plenty of fluids.  Please get plenty of rest.  Please return here or go to the Emergency Department for any concerns or worsening of condition.  If you were prescribed diflucan, take as directed.  Use antifungal drops as directed.   Recommend zyrtec-D twice a day.   You received an injection of a powerful NSAID today (Toradol).  It's effects will last up to 24 hours. Please do not take another NSAID (ie aspirin, ibuprofen, Aleve, Advil or Motrin) until this time tomorrow.  If you continue to have pain, you may take Tylenol (acetaminophen) if you are not allergic to this medication.    If not allergic, please take over the counter Tylenol (Acetaminophen) and/or Motrin (Ibuprofen) as directed for control of pain and/or fever.  Please follow up with your primary care doctor or specialist as needed.    If you  smoke, please stop smoking.

## 2025-07-07 ENCOUNTER — PATIENT MESSAGE (OUTPATIENT)
Dept: OTOLARYNGOLOGY | Facility: CLINIC | Age: 45
End: 2025-07-07

## 2025-07-07 ENCOUNTER — OFFICE VISIT (OUTPATIENT)
Dept: OTOLARYNGOLOGY | Facility: CLINIC | Age: 45
End: 2025-07-07
Payer: COMMERCIAL

## 2025-07-07 ENCOUNTER — TELEPHONE (OUTPATIENT)
Dept: OTOLARYNGOLOGY | Facility: CLINIC | Age: 45
End: 2025-07-07

## 2025-07-07 VITALS
WEIGHT: 178.38 LBS | SYSTOLIC BLOOD PRESSURE: 125 MMHG | HEIGHT: 64 IN | DIASTOLIC BLOOD PRESSURE: 83 MMHG | BODY MASS INDEX: 30.45 KG/M2

## 2025-07-07 DIAGNOSIS — H92.11 OTORRHEA OF RIGHT EAR: ICD-10-CM

## 2025-07-07 DIAGNOSIS — H60.8X3 CHRONIC ECZEMATOUS OTITIS EXTERNA OF BOTH EARS: ICD-10-CM

## 2025-07-07 DIAGNOSIS — B36.9 OTITIS EXTERNA, FUNGAL, RIGHT EAR: Primary | ICD-10-CM

## 2025-07-07 DIAGNOSIS — H62.41 OTITIS EXTERNA, FUNGAL, RIGHT EAR: Primary | ICD-10-CM

## 2025-07-07 PROCEDURE — 87075 CULTR BACTERIA EXCEPT BLOOD: CPT | Performed by: OTOLARYNGOLOGY

## 2025-07-07 PROCEDURE — 1159F MED LIST DOCD IN RCRD: CPT | Mod: CPTII,S$GLB,, | Performed by: OTOLARYNGOLOGY

## 2025-07-07 PROCEDURE — 87070 CULTURE OTHR SPECIMN AEROBIC: CPT | Performed by: OTOLARYNGOLOGY

## 2025-07-07 PROCEDURE — 92504 EAR MICROSCOPY EXAMINATION: CPT | Mod: S$GLB,,, | Performed by: OTOLARYNGOLOGY

## 2025-07-07 PROCEDURE — 3079F DIAST BP 80-89 MM HG: CPT | Mod: CPTII,S$GLB,, | Performed by: OTOLARYNGOLOGY

## 2025-07-07 PROCEDURE — 3074F SYST BP LT 130 MM HG: CPT | Mod: CPTII,S$GLB,, | Performed by: OTOLARYNGOLOGY

## 2025-07-07 PROCEDURE — 3008F BODY MASS INDEX DOCD: CPT | Mod: CPTII,S$GLB,, | Performed by: OTOLARYNGOLOGY

## 2025-07-07 PROCEDURE — 99214 OFFICE O/P EST MOD 30 MIN: CPT | Mod: 25,S$GLB,, | Performed by: OTOLARYNGOLOGY

## 2025-07-07 PROCEDURE — 87102 FUNGUS ISOLATION CULTURE: CPT | Performed by: OTOLARYNGOLOGY

## 2025-07-07 RX ORDER — CLOTRIMAZOLE 1 G/ML
SOLUTION TOPICAL 2 TIMES DAILY
Qty: 30 ML | Refills: 0 | Status: SHIPPED | OUTPATIENT
Start: 2025-07-07

## 2025-07-07 NOTE — TELEPHONE ENCOUNTER
Spoke with patient and she wanted to make sure that she can just buy the over the counter. Pharmacy does not have the rx in stock and it wouldn't be covered by her insurance either.   Disp Refills Start End AMELIA   clotrimazole (LOTRIMIN) 1 % Soln 30 mL 0 7/7/2025 -- No   Sig - Route: Apply topically 2 (two) times daily. - Topical (Top)   Sent to pharmacy as: clotrimazole (LOTRIMIN) 1 % Soln     Yes she can as long as it says topical solution and same strength.

## 2025-07-07 NOTE — TELEPHONE ENCOUNTER
Copied from CRM #[id number]. Topic: Medications - Medication Status Check   >> Jul 7, 2025 12:48 PM Tina wrote:  Type:  Needs Medical Advice    Who Called: Chantel  Pharmacy name and phone #:   [facility]   Would the patient rather a call back or a response via [facility]? Call  Best Call Back Number: [phone]   Additional Information: Patient would like to know if her meds were called into the pharm. Please call

## 2025-07-07 NOTE — TELEPHONE ENCOUNTER
Copied from CRM #[id number]. Topic: General Inquiry - Patient Advice  >> Jul 7, 2025  2:02 PM Radhames wrote:  Type:  Patient Returning Call    Who Called: Patient     Who Left Message for Patient: N/A    Does the patient know what this is regarding?: Pt requested a call back from the providers office and missed the call . Please Advise     Would the patient rather a call back or a response via [facility]? Call     Best Call Back Number:[phone]       Additional Information:

## 2025-07-07 NOTE — PROGRESS NOTES
OTOLARYNGOLOGY CLINIC NOTE  Date:  07/07/2025     Chief complaint:  Chief Complaint   Patient presents with    Ear Problem     Fungal infection , originally she was having headaches and facial pain, went to urgent care diagnosed with ear infection gave antibiotics wasn't working went again and they saw fungus in right ear          History of Present Illness  Chantel Alas is a 45 y.o. female  presenting today for a followup.    I have not seen her before . She saw NP anu cook. Had gone to urgent care and was diagnosed with bacterial infection for her ear and then went back and was told she had a fungal infection  Had been [facility]inse [facility] and at follow up was better so was not on rinse anymore    Had not gotten water in the ear prior to recent episode that she recalls  She does get ear itching. No ear drainage  Right ear feels clogged - not as clogged as it had been ; it had been ringing and throbbing as well.    She does use qtips     Had been given cortisporin as well prior to coming into urgent care at recent visit over the weekend   No issues really with left - just feels dry and itchy     Past Medical History  No past medical history on file.     Past Surgical History  Past Surgical History:   Procedure Laterality Date    COLONOSCOPY, SCREENING, LOW RISK PATIENT N/A 6/17/2025    Procedure: COLONOSCOPY, SCREENING, LOW RISK PATIENT;  Surgeon: Saray Ryan MD;  Location: [facility] ENDOSCOPY;  Service: Endoscopy;  Laterality: N/A;  ref by / suprep inst portal-RB  1/15/25- lvm/portal for pc. DBM  1/22 pt requesting a call back to reschedule.  [email or web address]  5/15-r/s-portal-female provider-peg prep-tb-requeded late arrival  5.26 pt requesting female provider; r.s from 6-2 to 6-    DILATION AND CURETTAGE OF UTERUS  2011    ESOPHAGOGASTRODUODENOSCOPY N/A 10/10/2024    Procedure: EGD (ESOPHAGOGASTRODUODENOSCOPY);  Surgeon: Dima Anderson MD;  Location: [facility] ENDOSCOPY;  Service: Endoscopy;   "Laterality: N/A;  7/17/24-Ref SERAMarcellus BarnhartMcknight NP, instr portal-DS  7/19/24-LVM with new arrival time of 1245pm, updated instr portal-DS  10/2 case moved to Dr. Anderson's schedule due to room closure-lvm for precall-st  10/7 lvm for precall-st  10.9 precall complete; pt schedule    WISDOM TOOTH EXTRACTION  2015        Medications  Medications Ordered Prior to Encounter[1]    Review of Systems  Review of Systems   Constitutional:  Positive for malaise/fatigue.   HENT:  Positive for ear pain and hearing loss.    Eyes: Negative.    Respiratory: Negative.     Cardiovascular: Negative.    Gastrointestinal: Negative.    Genitourinary: Negative.    Musculoskeletal: Negative.    Skin: Negative.    Neurological:  Positive for headaches.   Psychiatric/Behavioral:  Positive for depression. The patient is nervous/anxious.         Social History   reports that she has never smoked. She has never used smokeless tobacco. She reports that she does not currently use alcohol after a past usage of about 1.0 standard drink of alcohol per week. She reports that she does not use drugs.     Family History  Family History   Problem Relation Name Age of Onset    Hypertension Mother Mom     Stroke Mother Mom     Hypertension Father Dad     Cancer Maternal Grandmother M         leukemia    Leukemia Maternal Grandmother M     Cancer Maternal Grandfather MG     Cancer Paternal Grandmother PG     Cancer Paternal Grandfather JG     Glaucoma Maternal Aunt      Breast cancer Neg Hx      Colon cancer Neg Hx      Ovarian cancer Neg Hx          Physical Exam   Vitals:    07/07/25 0840   BP: 125/83    Body mass index is 30.61 kg/m².  Weight: 80.9 kg (178 lb 5.6 oz)   Height: 5' 4" (162.6 cm)     GENERAL: no acute distress.  HEAD: normocephalic.   EYES: No scleral icterus  EARS: external ear without lesion, normal pinna shape and position. Left  External auditory canal with normal cerumen, tympanic membrane fully visible, no perforation , no retraction. No " middle ear effusion. Ossicles intact. Right ear with drainage and fungal elements  NOSE: external nose without significant bony abnormality  ORAL CAVITY/OROPHARYNX: tongue mobile.   NECK: trachea midline.   LYMPH NODES:No cervical lymphadenopathy.  RESPIRATORY: no stridor, no stertor. Voice normal. Respirations nonlabored.  NEURO: alert, responds to questions appropriately.    PSYCH:mood appropriate    Procedure performed: microscopic examination of ears with debridement    Indication for procedure: otorrhea  Pre-procedure diagnosis: otitis externa  Post-procedure diagnosis: same    Description of procedure:  After verbal consent was obtained, the patient was positioned in semi recumbent position and speculum was placed in the right ear and microscope brought into the field.  The microscope was positioned and magnification adjusted for appropriate visualization. A sterile ear swab was inserted through the speculum and sampling of otorrhea obtained. Swab was placed in appropriate medium for culture. Otologic instruments including various size otologic suctions were then used to debride infectious material from the external auditory canal under visualization with the operating microscope. After cleaning, visualization was again performed and at various levels of higher magnification to optimize views of the ear canal, tympanic membrane, ossicles and middle ear space. The opposite ear was also examined under microscopy ; debridement was not required  Findings as indicated below. All portions of the procedure and examination by otomicroscopy were tolerated well without complication.     Findings:  Fungal elements right canal with drainage. Left canal with possible slight fungal elements         Imaging:  The patient does not have any new imaging of the head and neck since last visit.     Labs:  CBC  Recent Labs   Lab 11/13/23  1045 07/23/24  1652 11/16/24  1002   WBC 9.67 10.44 7.27   Hemoglobin 11.9 L 8.8 L 11.7 L    Hematocrit 39.5 30.9 L 39.1   MCV 92 72 L 85   Platelets 303 515 H 355     BMP  Recent Labs   Lab 11/13/23  1045 07/23/24  1652 11/16/24  1002   Glucose 84 86 66 L   Sodium 140 137 138   Potassium 4.3 3.8 3.9   Chloride 105 103 104   CO2 26 23 20 L   BUN 7 12 10   Creatinine 0.7 0.9 0.8   Calcium 9.4 9.8 9.5   Magnesium  --  2.2  --      COAGS        Assessment  1. Otitis externa, fungal, right ear  - Aerobic culture  - Culture, Anaerobic  - Fungus culture    2. Chronic eczematous otitis externa of both ears    3. Otorrhea of right ear       Plan:  Discussed plan of care with patient in detail and all questions answered. Patient reported understanding of plan of care. I gave the patient the opportunity to ask questions and patient confirmed all questions answered to satisfaction.     Cream to left ear- start of lloyd slight fungal elemevnts derbided    Alexandro fungal elements right ear debridement with m osuter boric acid placed today   May need to get back on solution combo will rechekc  next week and decide      F/u 1 week                [1]   Current Outpatient Medications on File Prior to Visit   Medication Sig Dispense Refill    buPROPion (WELLBUTRIN XL) 150 MG TB24 tablet Take 150 mg by mouth every morning.      busPIRone (BUSPAR) 7.5 MG tablet Take 7.5 mg by mouth 2 (two) times daily.      clonazePAM (KLONOPIN) 0.5 MG tablet Take 1 tablet (0.5 mg total) by mouth daily as needed for Anxiety (panic attacks). 30 tablet 1    clotrimazole-betamethasone 1-0.05% (LOTRISONE) cream Apply topically 2 (two) times daily. 15 g 0    ferrous sulfate (FEOSOL) 325 mg (65 mg iron) Tab tablet Take 1 tablet (325 mg total) by mouth daily with breakfast.  0    fluocinolone acetonide oiL 0.01 % Drop Place 5 drops in ear(s) 2 (two) times a day. 20 mL 2    ketorolac (TORADOL) 10 mg tablet Take 1 tablet (10 mg total) by mouth every 6 (six) hours as needed for Pain. 16 tablet 0    neomycin-polymyxin-hydrocortisone (CORTISPORIN)  3.5-10,000-1 mg/mL-unit/mL-% otic suspension Place 3 drops into the left ear 4 (four) times daily. for 7 days 10 mL 0    clotrimazole (LOTRIMIN) 1 % Soln Apply topically 2 (two) times daily. for 14 days 30 mL 0    [] fluconazole (DIFLUCAN) 150 MG Tab Take 1 tablet (150 mg total) by mouth once. May repeat in 1 week if not improved for 1 dose 1 tablet 0    hydrocortisone 2.5 % cream Apply topically 2 (two) times daily. for 5 days 20 g 0    topiramate (TOPAMAX) 25 MG tablet Take 1 tablet (25 mg total) by mouth once daily. (Patient not taking: Reported on 2025) 30 tablet 11     Current Facility-Administered Medications on File Prior to Visit   Medication Dose Route Frequency Provider Last Rate Last Admin    [COMPLETED] ketorolac injection 30 mg  30 mg Intramuscular 1 time in Clinic/HOD    30 mg at 25 [id number]

## 2025-07-09 NOTE — TELEPHONE ENCOUNTER
Called pt per Dr. Bay needs to start medication that Mrs. Bryant Wall sent [facility]. Pt verb understanding

## 2025-07-11 ENCOUNTER — HOSPITAL ENCOUNTER (EMERGENCY)
Age: 45
Discharge: HOME | End: 2025-07-12
Attending: EMERGENCY MEDICINE
Payer: COMMERCIAL

## 2025-07-11 DIAGNOSIS — H62.41 OTOMYCOSIS OF RIGHT EAR: Primary | ICD-10-CM

## 2025-07-11 DIAGNOSIS — B36.9 OTOMYCOSIS OF RIGHT EAR: Primary | ICD-10-CM

## 2025-07-11 DIAGNOSIS — H72.91 PERFORATION OF RIGHT TYMPANIC MEMBRANE: ICD-10-CM

## 2025-07-11 LAB
BACTERIA SPEC AEROBE CULT: NO GROWTH
BACTERIA SPEC ANAEROBE CULT: NO GROWTH

## 2025-07-11 PROCEDURE — 99283 EMERGENCY DEPT VISIT LOW MDM: CPT

## 2025-07-11 RX ORDER — BUSPIRONE HYDROCHLORIDE 7.5 MG/1
7.5 TABLET ORAL 3 TIMES DAILY
COMMUNITY

## 2025-07-11 RX ORDER — BUPROPION HYDROCHLORIDE 150 MG/1
150 TABLET ORAL DAILY
COMMUNITY

## 2025-07-12 VITALS
HEIGHT: 64 IN | BODY MASS INDEX: 29.88 KG/M2 | OXYGEN SATURATION: 100 % | TEMPERATURE: 99 F | WEIGHT: 175 LBS | HEART RATE: 80 BPM | SYSTOLIC BLOOD PRESSURE: 124 MMHG | RESPIRATION RATE: 16 BRPM | DIASTOLIC BLOOD PRESSURE: 86 MMHG

## 2025-07-12 RX ORDER — FLUCONAZOLE 200 MG/1
TABLET ORAL
Qty: 6 TABLET | Refills: 0 | Status: SHIPPED | OUTPATIENT
Start: 2025-07-12

## 2025-07-14 LAB — FUNGUS SPEC CULT: ABNORMAL

## 2025-07-15 LAB — BACTERIA ISLT: ABNORMAL

## 2025-07-16 ENCOUNTER — OFFICE VISIT (OUTPATIENT)
Dept: OTOLARYNGOLOGY | Facility: CLINIC | Age: 45
End: 2025-07-16
Payer: COMMERCIAL

## 2025-07-16 VITALS
DIASTOLIC BLOOD PRESSURE: 87 MMHG | BODY MASS INDEX: 30.45 KG/M2 | HEIGHT: 64 IN | SYSTOLIC BLOOD PRESSURE: 122 MMHG | WEIGHT: 178.38 LBS

## 2025-07-16 DIAGNOSIS — B36.9 OTITIS EXTERNA, FUNGAL, RIGHT EAR: ICD-10-CM

## 2025-07-16 DIAGNOSIS — H60.8X3 CHRONIC ECZEMATOUS OTITIS EXTERNA OF BOTH EARS: ICD-10-CM

## 2025-07-16 DIAGNOSIS — H72.91 PERFORATION OF RIGHT TYMPANIC MEMBRANE: Primary | ICD-10-CM

## 2025-07-16 DIAGNOSIS — H73.21 MYRINGITIS OF RIGHT EAR: ICD-10-CM

## 2025-07-16 DIAGNOSIS — H62.41 OTITIS EXTERNA, FUNGAL, RIGHT EAR: ICD-10-CM

## 2025-07-16 PROCEDURE — 3079F DIAST BP 80-89 MM HG: CPT | Mod: CPTII,S$GLB,, | Performed by: OTOLARYNGOLOGY

## 2025-07-16 PROCEDURE — 3008F BODY MASS INDEX DOCD: CPT | Mod: CPTII,S$GLB,, | Performed by: OTOLARYNGOLOGY

## 2025-07-16 PROCEDURE — 3074F SYST BP LT 130 MM HG: CPT | Mod: CPTII,S$GLB,, | Performed by: OTOLARYNGOLOGY

## 2025-07-16 PROCEDURE — 99214 OFFICE O/P EST MOD 30 MIN: CPT | Mod: 25,S$GLB,, | Performed by: OTOLARYNGOLOGY

## 2025-07-16 PROCEDURE — 92504 EAR MICROSCOPY EXAMINATION: CPT | Mod: S$GLB,,, | Performed by: OTOLARYNGOLOGY

## 2025-07-16 RX ORDER — AMOXICILLIN AND CLAVULANATE POTASSIUM 875; 125 MG/1; MG/1
1 TABLET, FILM COATED ORAL 2 TIMES DAILY
Qty: 14 TABLET | Refills: 0 | Status: SHIPPED | OUTPATIENT
Start: 2025-07-16 | End: 2025-07-23

## 2025-07-16 RX ORDER — FLUCONAZOLE 100 MG/1
100 TABLET ORAL DAILY
Qty: 30 TABLET | Refills: 0 | Status: SHIPPED | OUTPATIENT
Start: 2025-07-16 | End: 2025-08-15

## 2025-07-16 NOTE — PROGRESS NOTES
OTOLARYNGOLOGY CLINIC NOTE  Date:  07/16/2025     Chief complaint:  No chief complaint on file.      History of Present Illness  Chantel Alas is a 45 y.o. female  presenting today for a followup.    She had flown at some point after visit with me and I think she perforated her ear drum because it was after this she messaged the office about the drop burning and being able to taste them. She went to urgent care when out of town and  given po antifungal  Ear is feeling better and less itchy    Past Medical History  No past medical history on file.     Past Surgical History  Past Surgical History:   Procedure Laterality Date    COLONOSCOPY, SCREENING, LOW RISK PATIENT N/A 6/17/2025    Procedure: COLONOSCOPY, SCREENING, LOW RISK PATIENT;  Surgeon: Saray Ryan MD;  Location: [facility] to [facility] ENDOSCOPY;  Service: Endoscopy;  Laterality: N/A;  ref by / suprep inst portal-RB  1/15/25- lvm/portal for pc. DBM  1/22 pt requesting a call back to reschedule.  [email or web address]  5/15-r/s-portal-female provider-peg prep-tb-requeded late arrival  5.26 pt requesting female provider; r.s from 6-2 to 6-    DILATION AND CURETTAGE OF UTERUS  2011    ESOPHAGOGASTRODUODENOSCOPY N/A 10/10/2024    Procedure: EGD (ESOPHAGOGASTRODUODENOSCOPY);  Surgeon: Dima Anderson MD;  Location: [facility] to [facility] ENDOSCOPY;  Service: Endoscopy;  Laterality: N/A;  7/17/24-Ref SERENA Mcknight NP, instr portal-DS  7/19/24-LVM with new arrival time of 1245pm, updated instr portal-DS  10/2 case moved to Dr. Anderson's schedule due to room closure-lvm for precall-st  10/7 lvm for precall-st  10.9 precall complete; pt schedule    WISDOM TOOTH EXTRACTION  2015        Medications  Medications Ordered Prior to Encounter[1]    Review of Systems  Review of Systems   Constitutional:  Positive for malaise/fatigue.   HENT:  Positive for ear pain and hearing loss.    Eyes: Negative.    Respiratory: Negative.     Cardiovascular: Negative.    Gastrointestinal: Negative.    Genitourinary:  Negative.    Musculoskeletal: Negative.    Skin: Negative.    Neurological:  Positive for headaches.   Psychiatric/Behavioral:  Positive for depression. The patient is nervous/anxious.         Answers submitted by the patient for this visit:  Review of Symptoms Questionnaire  (Submitted on 7/9/2025)  Ear infection(s)?: Yes    sleep disturbance: Yes  Social History   reports that she has never smoked. She has never used smokeless tobacco. She reports that she does not currently use alcohol after a past usage of about 1.0 standard drink of alcohol per week. She reports that she does not use drugs.     Family History  Family History   Problem Relation Name Age of Onset    Hypertension Mother Mom     Stroke Mother Mom     Hypertension Father Dad     Cancer Maternal Grandmother M         leukemia    Leukemia Maternal Grandmother M     Cancer Maternal Grandfather MG     Cancer Paternal Grandmother PG     Cancer Paternal Grandfather JG     Glaucoma Maternal Aunt      Breast cancer Neg Hx      Colon cancer Neg Hx      Ovarian cancer Neg Hx          Physical Exam   Vitals:    07/16/25 1544   BP: 122/87    Body mass index is 30.61 kg/m².            GENERAL: no acute distress.  HEAD: normocephalic.   EYES: No scleral icterus  EARS: external ear without lesion, normal pinna shape and position.  See micro   NOSE: external nose without significant bony abnormality  ORAL CAVITY/OROPHARYNX: tongue mobile.   NECK: trachea midline.   LYMPH NODES:No cervical lymphadenopathy.  RESPIRATORY: no stridor, no stertor. Voice normal. Respirations nonlabored.  NEURO: alert, responds to questions appropriately.    PSYCH:mood appropriate    Procedure performed: microscopic examination of ears with debridement    Indication for procedure: otorrhea  Pre-procedure diagnosis: otitis externa  Post-procedure diagnosis: same    Description of procedure:  After verbal consent was obtained, the patient was positioned in semi recumbent position and  speculum was placed in the right ear and microscope brought into the field.  The microscope was positioned and magnification adjusted for appropriate visualization. Otologic instruments including various size otologic suctions were then used to debride infectious material from the external auditory canal under visualization with the operating microscope. After cleaning, visualization was again performed and at various levels of higher magnification to optimize views of the ear canal, tympanic membrane, ossicles and middle ear space. The opposite ear was also examined under microscopy ; debridement was not required Findings as indicated below. All portions of the procedure and examination by otomicroscopy were tolerated well without complication.     Findings:  Cluster of tiny black dots in waxc on left- stable  No Fungal elements right ear canal; old boric acid with yellow drainag hardened on edge of canal    About 35% perforation of inferior right tympanic membrane. Drum erythematuos and thickened appearing with mucopurulent drainage suggestive of myringitis    Imaging:  The patient does not have any new imaging of the head and neck since last visit.     Labs:  CBC  Recent Labs   Lab 11/13/23  1045 07/23/24  1652 11/16/24  1002   WBC 9.67 10.44 7.27   Hemoglobin 11.9 L 8.8 L 11.7 L   Hematocrit 39.5 30.9 L 39.1   MCV 92 72 L 85   Platelets 303 515 H 355     BMP  Recent Labs   Lab 11/13/23  1045 07/23/24  1652 11/16/24  1002   Glucose 84 86 66 L   Sodium 140 137 138   Potassium 4.3 3.8 3.9   Chloride 105 103 104   CO2 26 23 20 L   BUN 7 12 10   Creatinine 0.7 0.9 0.8   Calcium 9.4 9.8 9.5   Magnesium  --  2.2  --      COAGS        Assessment  1. Perforation of right tympanic membrane    2. Otitis externa, fungal, right ear    3. Chronic eczematous otitis externa of both ears    4. Myringitis of right ear       Plan:  Discussed plan of care with patient in detail and all questions answered. Patient reported  understanding of plan of care. I gave the patient the opportunity to ask questions and patient confirmed all questions answered to satisfaction.     Now has perforation of the right ear. Unable to give [facility] pharmacy ear rinse given saline no longer sterile per discussion with pharmacy  Discussed potential chance of clotrimazole otoxicity with perforation, wlthouh low risk can lead to hearing loss if gets through round window  Offered to put cream in personally so that did not go in far  Prelim culture showing aspergilis  Has some small black dots on canal on left- continue clotrimazole on that side  Also appears to have bad myringitis- augmentin rx'd  I had placed boric acid in her ear at last appointment  when did not have  a perforation so I think that helped the infection more so than the po fluconazole but the po can sometimes help as well so will send additional of that as well     Dry ear precautions    Advised not to fly- perforation already close to annulus, do not want any further damage or enlargement because if starts to involve annulus it is less likely to heal   Discussed with her that I typically like to wait and additional week ( ie continue aural toilent and topical medication ) after symptoms start improving and exam improving given propensity of fungal infection recurs     F/u 1 week - has apptmt already with NP               [1]   Current Outpatient Medications on File Prior to Visit   Medication Sig Dispense Refill    buPROPion (WELLBUTRIN XL) 150 MG TB24 tablet Take 150 mg by mouth every morning.      busPIRone (BUSPAR) 7.5 MG tablet Take 7.5 mg by mouth 2 (two) times daily.      clonazePAM (KLONOPIN) 0.5 MG tablet Take 1 tablet (0.5 mg total) by mouth daily as needed for Anxiety (panic attacks). 30 tablet 1    clotrimazole (LOTRIMIN) 1 % Soln Apply topically 2 (two) times daily. 30 mL 0    clotrimazole-betamethasone 1-0.05% (LOTRISONE) cream Apply topically 2 (two) times daily. 15 g 0     ferrous sulfate (FEOSOL) 325 mg (65 mg iron) Tab tablet Take 1 tablet (325 mg total) by mouth daily with breakfast.  0    fluocinolone acetonide oiL 0.01 % Drop Place 5 drops in ear(s) 2 (two) times a day. 20 mL 2    hydrocortisone 2.5 % cream Apply topically 2 (two) times daily. for 5 days 20 g 0    topiramate (TOPAMAX) 25 MG tablet Take 1 tablet (25 mg total) by mouth once daily. (Patient not taking: Reported on 7/7/2025) 30 tablet 11     No current facility-administered medications on file prior to visit.

## 2025-07-17 ENCOUNTER — PATIENT MESSAGE (OUTPATIENT)
Dept: OTOLARYNGOLOGY | Facility: CLINIC | Age: 45
End: 2025-07-17
Payer: COMMERCIAL

## 2025-07-21 ENCOUNTER — TELEPHONE (OUTPATIENT)
Dept: OTOLARYNGOLOGY | Facility: CLINIC | Age: 45
End: 2025-07-21
Payer: COMMERCIAL

## 2025-07-21 NOTE — TELEPHONE ENCOUNTER
Still have not received a fax from her dentist office for dental work clearance. Will just type a letter in the portal for patient to access it.

## 2025-07-21 NOTE — TELEPHONE ENCOUNTER
Copied from CRM #[id number]. Topic: General Inquiry - Patient Advice  >> Jul 21, 2025  1:58 PM Alex wrote:  .Type:  Needs Medical Advice    Who Called: [facility]  [place]  Would the patient rather a call back or a response via [facility]?  Call back  Best Call Back Number: [phone]  Additional Information: Pt. Is have some dental surgery and needs a medical clearance from ENT.  Fax # [phone].

## 2025-07-23 ENCOUNTER — OFFICE VISIT (OUTPATIENT)
Dept: OTOLARYNGOLOGY | Facility: CLINIC | Age: 45
End: 2025-07-23
Payer: COMMERCIAL

## 2025-07-23 VITALS
BODY MASS INDEX: 29.75 KG/M2 | DIASTOLIC BLOOD PRESSURE: 76 MMHG | HEIGHT: 64 IN | WEIGHT: 174.25 LBS | SYSTOLIC BLOOD PRESSURE: 109 MMHG

## 2025-07-23 DIAGNOSIS — H93.11 TINNITUS OF RIGHT EAR: ICD-10-CM

## 2025-07-23 DIAGNOSIS — H60.8X3 CHRONIC ECZEMATOUS OTITIS EXTERNA OF BOTH EARS: ICD-10-CM

## 2025-07-23 DIAGNOSIS — H72.91 PERFORATION OF RIGHT TYMPANIC MEMBRANE: Primary | ICD-10-CM

## 2025-07-23 PROCEDURE — 99213 OFFICE O/P EST LOW 20 MIN: CPT | Mod: S$GLB,,, | Performed by: NURSE PRACTITIONER

## 2025-07-23 PROCEDURE — 3074F SYST BP LT 130 MM HG: CPT | Mod: CPTII,S$GLB,, | Performed by: NURSE PRACTITIONER

## 2025-07-23 PROCEDURE — 3008F BODY MASS INDEX DOCD: CPT | Mod: CPTII,S$GLB,, | Performed by: NURSE PRACTITIONER

## 2025-07-23 PROCEDURE — 3078F DIAST BP <80 MM HG: CPT | Mod: CPTII,S$GLB,, | Performed by: NURSE PRACTITIONER

## 2025-07-23 NOTE — PROGRESS NOTES
OTOLARYNGOLOGY CLINIC NOTE  Date:  07/23/2025     Chief complaint:  Chief Complaint   Patient presents with    Follow-up     fungal ear infection, right ears still itchy, clogged and ringing     History of Present Illness  Chantel Alas is a 45 y.o. female  presenting today for fungal infection f/u.  Pt reports she has been getting her ears dry.  Pt reports using cotton to the outside of her ears to prevent water from entering.  Pt reports she has been using the cream in her left ear for the itching which has been helping a little.  Pt reports the right ear continues to itch on and off.  Pt reports she continues to hear ringing in her right ear also.      Past Medical History  No past medical history on file.     Past Surgical History  Past Surgical History:   Procedure Laterality Date    COLONOSCOPY, SCREENING, LOW RISK PATIENT N/A 6/17/2025    Procedure: COLONOSCOPY, SCREENING, LOW RISK PATIENT;  Surgeon: Saray Ryan MD;  Location: [facility] ENDOSCOPY;  Service: Endoscopy;  Laterality: N/A;  ref by / suprep inst portal-RB  1/15/25- lvm/portal for pc. DBM  1/22 pt requesting a call back to reschedule.  [email or web address]  5/15-r/s-portal-female provider-peg prep-tb-requeded late arrival  5.26 pt requesting female provider; r.s from 6-2 to 6-    DILATION AND CURETTAGE OF UTERUS  2011    ESOPHAGOGASTRODUODENOSCOPY N/A 10/10/2024    Procedure: EGD (ESOPHAGOGASTRODUODENOSCOPY);  Surgeon: Dima Anderson MD;  Location: [facility] ENDOSCOPY;  Service: Endoscopy;  Laterality: N/A;  7/17/24-Ref SERENA Mcknight NP, instr portal-DS  7/19/24-LVM with new arrival time of 1245pm, updated instr portal-DS  10/2 case moved to Dr. Anderson's schedule due to room closure-lvm for precall-st  10/7 lvm for precall-st  10.9 precall complete; pt schedule    WISDOM TOOTH EXTRACTION  2015      Medications  Medications Ordered Prior to Encounter[1]    Review of Systems  Review of Systems   Constitutional: Negative.    HENT:  Positive for tinnitus.   "  Eyes: Negative.    Respiratory: Negative.     Cardiovascular: Negative.    Gastrointestinal: Negative.    Genitourinary: Negative.    Musculoskeletal: Negative.    Skin: Negative.    Neurological: Negative.    Psychiatric/Behavioral:  The patient is nervous/anxious.       Social History   reports that she has never smoked. She has never used smokeless tobacco. She reports that she does not currently use alcohol after a past usage of about 1.0 standard drink of alcohol per week. She reports that she does not use drugs.     Family History  Family History   Problem Relation Name Age of Onset    Hypertension Mother Mom     Stroke Mother Mom     Hypertension Father Dad     Cancer Maternal Grandmother M         leukemia    Leukemia Maternal Grandmother M     Cancer Maternal Grandfather MG     Cancer Paternal Grandmother PG     Cancer Paternal Grandfather JG     Glaucoma Maternal Aunt      Breast cancer Neg Hx      Colon cancer Neg Hx      Ovarian cancer Neg Hx        Physical Exam   Vitals:    07/23/25 0928   BP: 109/76    Body mass index is 29.91 kg/m².  Weight: 79.1 kg (174 lb 4.4 oz)   Height: 5' 4" (162.6 cm)     GENERAL: no acute distress.  HEAD: normocephalic.   EYES: lids and lashes normal. No scleral icterus  EARS: external ear without lesion, normal pinna shape and position.  Left external auditory canal dry without cerumen, tympanic membrane fully visible, no perforation , no retraction. No middle ear effusion.   Right external auditory canal dry, flaky skin around edges which was removed with suction; tympanic membrane fully visible with small perforation noted.  No drainage or exudate seen.    NOSE: external nose without significant bony abnormality  ORAL CAVITY/OROPHARYNX: tongue midline and mobile. Symmetric palate rise.    NECK: trachea midline.   RESPIRATORY: no stridor, no stertor. Voice normal. Respirations nonlabored.  NEURO: alert, responds to questions appropriately.   PSYCH:mood " appropriate    Imaging:  The patient does not have any pertinent and/or recent imaging of the head and neck.     Labs:  CBC  Recent Labs   Lab 11/13/23  1045 07/23/24  1652 11/16/24  1002   WBC 9.67 10.44 7.27   Hemoglobin 11.9 L 8.8 L 11.7 L   Hematocrit 39.5 30.9 L 39.1   MCV 92 72 L 85   Platelets 303 515 H 355     BMP  Recent Labs   Lab 11/13/23  1045 07/23/24  1652 11/16/24  1002   Glucose 84 86 66 L   Sodium 140 137 138   Potassium 4.3 3.8 3.9   Chloride 105 103 104   CO2 26 23 20 L   BUN 7 12 10   Creatinine 0.7 0.9 0.8   Calcium 9.4 9.8 9.5   Magnesium  --  2.2  --      Assessment  1. Perforation of right tympanic membrane    2. Chronic eczematous otitis externa of both ears    3. Tinnitus of right ear     Plan:  Pt advised to continue dry ear precautions.  Pt advised not to put anything in the right ear given the perforation for now.  Pt advised to continue cream to left ear as ordered.  Will order audiogram if tinnitus remains at follow up appt..   F/u in 1 month.    Discussed plan of care with patient in detail and all questions answered. Patient reported understanding of plan of care.        [1]   Current Outpatient Medications on File Prior to Visit   Medication Sig Dispense Refill    amoxicillin-clavulanate 875-125mg (AUGMENTIN) 875-125 mg per tablet Take 1 tablet by mouth 2 (two) times daily. for 7 days 14 tablet 0    buPROPion (WELLBUTRIN XL) 150 MG TB24 tablet Take 150 mg by mouth every morning.      busPIRone (BUSPAR) 7.5 MG tablet Take 7.5 mg by mouth 2 (two) times daily.      clonazePAM (KLONOPIN) 0.5 MG tablet Take 1 tablet (0.5 mg total) by mouth daily as needed for Anxiety (panic attacks). 30 tablet 1    clotrimazole (LOTRIMIN) 1 % Soln Apply topically 2 (two) times daily. 30 mL 0    clotrimazole-betamethasone 1-0.05% (LOTRISONE) cream Apply topically 2 (two) times daily. 15 g 0    ferrous sulfate (FEOSOL) 325 mg (65 mg iron) Tab tablet Take 1 tablet (325 mg total) by mouth daily with  breakfast.  0    fluconazole (DIFLUCAN) 100 MG tablet Take 1 tablet (100 mg total) by mouth once daily. 30 tablet 0    fluocinolone acetonide oiL 0.01 % Drop Place 5 drops in ear(s) 2 (two) times a day. 20 mL 2    hydrocortisone 2.5 % cream Apply topically 2 (two) times daily. for 5 days 20 g 0    topiramate (TOPAMAX) 25 MG tablet Take 1 tablet (25 mg total) by mouth once daily. 30 tablet 11     No current facility-administered medications on file prior to visit.

## 2025-07-26 ENCOUNTER — PATIENT MESSAGE (OUTPATIENT)
Dept: HEMATOLOGY/ONCOLOGY | Facility: CLINIC | Age: 45
End: 2025-07-26
Payer: COMMERCIAL

## 2025-07-29 ENCOUNTER — LAB VISIT (OUTPATIENT)
Dept: LAB | Facility: HOSPITAL | Age: 45
End: 2025-07-29
Payer: COMMERCIAL

## 2025-07-29 DIAGNOSIS — D50.9 IRON DEFICIENCY ANEMIA, UNSPECIFIED IRON DEFICIENCY ANEMIA TYPE: Primary | ICD-10-CM

## 2025-07-29 DIAGNOSIS — D50.9 IRON DEFICIENCY ANEMIA, UNSPECIFIED IRON DEFICIENCY ANEMIA TYPE: ICD-10-CM

## 2025-07-29 LAB
ABSOLUTE EOSINOPHIL (OHS): 0.45 K/UL
ABSOLUTE MONOCYTE (OHS): 0.69 K/UL (ref 0.3–1)
ABSOLUTE NEUTROPHIL COUNT (OHS): 5.95 K/UL (ref 1.8–7.7)
ALBUMIN SERPL BCP-MCNC: 4.1 G/DL (ref 3.5–5.2)
ALP SERPL-CCNC: 64 UNIT/L (ref 40–150)
ALT SERPL W/O P-5'-P-CCNC: <8 UNIT/L (ref 0–55)
ANION GAP (OHS): 9 MMOL/L (ref 8–16)
AST SERPL-CCNC: 21 UNIT/L (ref 0–50)
BASOPHILS # BLD AUTO: 0.06 K/UL
BASOPHILS NFR BLD AUTO: 0.6 %
BILIRUB SERPL-MCNC: 0.1 MG/DL (ref 0.1–1)
BUN SERPL-MCNC: 17 MG/DL (ref 6–20)
CALCIUM SERPL-MCNC: 8.7 MG/DL (ref 8.7–10.5)
CHLORIDE SERPL-SCNC: 105 MMOL/L (ref 95–110)
CO2 SERPL-SCNC: 21 MMOL/L (ref 23–29)
CREAT SERPL-MCNC: 1 MG/DL (ref 0.5–1.4)
ERYTHROCYTE [DISTWIDTH] IN BLOOD BY AUTOMATED COUNT: 22.5 % (ref 11.5–14.5)
FERRITIN SERPL-MCNC: 13 NG/ML (ref 20–300)
GFR SERPLBLD CREATININE-BSD FMLA CKD-EPI: >60 ML/MIN/1.73/M2
GLUCOSE SERPL-MCNC: 88 MG/DL (ref 70–110)
HCT VFR BLD AUTO: 28.2 % (ref 37–48.5)
HGB BLD-MCNC: 7.9 GM/DL (ref 12–16)
IMM GRANULOCYTES # BLD AUTO: 0.04 K/UL (ref 0–0.04)
IMM GRANULOCYTES NFR BLD AUTO: 0.4 % (ref 0–0.5)
IRON SATN MFR SERPL: 7 % (ref 20–50)
IRON SERPL-MCNC: 35 UG/DL (ref 30–160)
LYMPHOCYTES # BLD AUTO: 2.37 K/UL (ref 1–4.8)
MCH RBC QN AUTO: 19 PG (ref 27–31)
MCHC RBC AUTO-ENTMCNC: 28 G/DL (ref 32–36)
MCV RBC AUTO: 68 FL (ref 82–98)
NUCLEATED RBC (/100WBC) (OHS): 0 /100 WBC
PLATELET # BLD AUTO: 416 K/UL (ref 150–450)
PMV BLD AUTO: 10.1 FL (ref 9.2–12.9)
POTASSIUM SERPL-SCNC: 4.2 MMOL/L (ref 3.5–5.1)
PROT SERPL-MCNC: 7.6 GM/DL (ref 6–8.4)
RBC # BLD AUTO: 4.15 M/UL (ref 4–5.4)
RELATIVE EOSINOPHIL (OHS): 4.7 %
RELATIVE LYMPHOCYTE (OHS): 24.8 % (ref 18–48)
RELATIVE MONOCYTE (OHS): 7.2 % (ref 4–15)
RELATIVE NEUTROPHIL (OHS): 62.3 % (ref 38–73)
SODIUM SERPL-SCNC: 135 MMOL/L (ref 136–145)
TIBC SERPL-MCNC: 519 UG/DL (ref 250–450)
TRANSFERRIN SERPL-MCNC: 351 MG/DL (ref 200–375)
WBC # BLD AUTO: 9.56 K/UL (ref 3.9–12.7)

## 2025-07-29 PROCEDURE — 80053 COMPREHEN METABOLIC PANEL: CPT

## 2025-07-29 PROCEDURE — 84466 ASSAY OF TRANSFERRIN: CPT

## 2025-07-29 PROCEDURE — 36415 COLL VENOUS BLD VENIPUNCTURE: CPT

## 2025-07-29 PROCEDURE — 82728 ASSAY OF FERRITIN: CPT

## 2025-07-29 PROCEDURE — 85025 COMPLETE CBC W/AUTO DIFF WBC: CPT

## 2025-07-30 ENCOUNTER — OFFICE VISIT (OUTPATIENT)
Dept: HEMATOLOGY/ONCOLOGY | Facility: CLINIC | Age: 45
End: 2025-07-30
Payer: COMMERCIAL

## 2025-07-30 VITALS
BODY MASS INDEX: 31.32 KG/M2 | TEMPERATURE: 98 F | HEART RATE: 97 BPM | SYSTOLIC BLOOD PRESSURE: 121 MMHG | WEIGHT: 183.44 LBS | DIASTOLIC BLOOD PRESSURE: 60 MMHG | RESPIRATION RATE: 17 BRPM | HEIGHT: 64 IN | OXYGEN SATURATION: 97 %

## 2025-07-30 DIAGNOSIS — D50.9 IRON DEFICIENCY ANEMIA, UNSPECIFIED IRON DEFICIENCY ANEMIA TYPE: Primary | ICD-10-CM

## 2025-07-30 LAB
ANISOCYTOSIS BLD QL SMEAR: SLIGHT
BURR CELLS BLD QL SMEAR: NORMAL
DACRYOCYTES BLD QL SMEAR: NORMAL
HYPOCHROMIA BLD QL SMEAR: NORMAL
OVALOCYTES BLD QL SMEAR: NORMAL
PLATELET BLD QL SMEAR: NORMAL
POIKILOCYTOSIS BLD QL SMEAR: SLIGHT
POLYCHROMASIA BLD QL SMEAR: NORMAL
SCHISTOCYTES BLD QL SMEAR: NORMAL
SPHEROCYTES BLD QL SMEAR: NORMAL
TARGETS BLD QL SMEAR: NORMAL

## 2025-07-30 PROCEDURE — 99999 PR PBB SHADOW E&M-EST. PATIENT-LVL IV: CPT | Mod: PBBFAC,,,

## 2025-07-30 RX ORDER — HEPARIN 100 UNIT/ML
500 SYRINGE INTRAVENOUS
OUTPATIENT
Start: 2025-08-06

## 2025-07-30 RX ORDER — SODIUM CHLORIDE 0.9 % (FLUSH) 0.9 %
10 SYRINGE (ML) INJECTION
OUTPATIENT
Start: 2025-08-06

## 2025-07-30 RX ORDER — EPINEPHRINE 0.3 MG/.3ML
0.3 INJECTION SUBCUTANEOUS ONCE AS NEEDED
OUTPATIENT
Start: 2025-08-06

## 2025-07-30 NOTE — LETTER
July 30, 2025    Chantel Alas  [address]  [place] [address]             [facility]  [address]  [facility] [address]  Phone: [phone]  Fax: [phone] Dear Sir naye Powell,    I writing this letter on behalf of Ms. Chantel Alas. I recently evaluated her for severe iron deficiency anemia. 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 We are in the process of correcting her condition but in the meantime, please take this into account.      If you have any questions or concerns, please don't hesitate to call.    Sincerely,        Pinky Michaels, DMITRY-C

## 2025-07-30 NOTE — PROGRESS NOTES
Subjective:      Patient ID: Chantel Alas is a 45 y.o. female.    Chief Complaint: Fatigue      HPI:  Chantel Alas (Chantel) is a pleasant 45 y.o.female with past medical history of MDD/YONG and iron deficiency anemia requiring parenteral iron here for ongoing ROSENDO. She was previously evaluated by Dr. Roman in January 2024.    She reports chronic history of ROSENDO for many years, and takes liquid oral iron replacement. She has regular periods, with heavy bleeding on days 2 and 3 of her cycle and lighter flow for last 2-3 days. She has noticed worsening fatigue and lack of energy that she attributes to iron being low.  No melena/hematochezia/bowel complaints. No fhx of colon cancer.  She works as teacher at [employer].     She is up to date with mammogram and pap test. She is waiting for her new insurance to start before scheduling a colonoscopy.    I have reviewed all of the patient's relevant lab work available in the medical record and have utilized this in my evaluation and management recommendations today.    Social History[1]    Family History   Problem Relation Name Age of Onset    Hypertension Mother Mom     Stroke Mother Mom     Hypertension Father Dad     Cancer Maternal Grandmother M         leukemia    Leukemia Maternal Grandmother M     Cancer Maternal Grandfather MG     Cancer Paternal Grandmother PG     Cancer Paternal Grandfather JG     Glaucoma Maternal Aunt      Breast cancer Neg Hx      Colon cancer Neg Hx      Ovarian cancer Neg Hx         Past Surgical History:   Procedure Laterality Date    COLONOSCOPY, SCREENING, LOW RISK PATIENT N/A 6/17/2025    Procedure: COLONOSCOPY, SCREENING, LOW RISK PATIENT;  Surgeon: Saray Ryan MD;  Location: [facility] ENDOSCOPY;  Service: Endoscopy;  Laterality: N/A;  ref by / suprep inst portal-RB  1/15/25- lvm/portal for pc. DBM  1/22 pt requesting a call back to reschedule.  [email or web address]  5/15-r/s-portal-female provider-peg  prep-tb-requeded late arrival  5.26 pt requesting female provider; r.s from 6-2 to 6-    DILATION AND CURETTAGE OF UTERUS  2011    ESOPHAGOGASTRODUODENOSCOPY N/A 10/10/2024    Procedure: EGD (ESOPHAGOGASTRODUODENOSCOPY);  Surgeon: Dima Anderson MD;  Location: [facility] ENDOSCOPY;  Service: Endoscopy;  Laterality: N/A;  7/17/24-Ref SERENA Mcknight NP, instr portal-DS  7/19/24-LVM with new arrival time of 1245pm, updated instr portal-DS  10/2 case moved to Dr. Anderson's schedule due to room closure-lvm for precall-st  10/7 lvm for precall-st  10.9 precall complete; pt schedule    WISDOM TOOTH EXTRACTION  2015       History reviewed. No pertinent past medical history.    Review of Systems   Constitutional:  Positive for fatigue. Negative for appetite change, chills, diaphoresis, fever and unexpected weight change.   HENT:  Negative for mouth sores and nosebleeds.    Eyes:  Negative for visual disturbance.   Respiratory:  Negative for shortness of breath.    Cardiovascular:  Negative for chest pain.   Gastrointestinal:  Positive for abdominal pain. Negative for abdominal distention, anal bleeding, blood in stool, diarrhea, nausea and vomiting.   Genitourinary:  Positive for menstrual problem. Negative for hematuria and vaginal bleeding.   Musculoskeletal:  Positive for arthralgias and back pain.   Skin:  Negative for rash.   Hematological:  Negative for adenopathy. Does not bruise/bleed easily.          Medication List with Changes/Refills   Current Medications    BUPROPION (WELLBUTRIN XL) 150 MG TB24 TABLET    Take 150 mg by mouth every morning.    BUSPIRONE (BUSPAR) 7.5 MG TABLET    Take 7.5 mg by mouth 2 (two) times daily.    CLONAZEPAM (KLONOPIN) 0.5 MG TABLET    Take 1 tablet (0.5 mg total) by mouth daily as needed for Anxiety (panic attacks).    CLOTRIMAZOLE (LOTRIMIN) 1 % SOLN    Apply topically 2 (two) times daily.    CLOTRIMAZOLE-BETAMETHASONE 1-0.05% (LOTRISONE) CREAM    Apply topically 2 (two) times daily.     FERROUS SULFATE (FEOSOL) 325 MG (65 MG IRON) TAB TABLET    Take 1 tablet (325 mg total) by mouth daily with breakfast.    FLUCONAZOLE (DIFLUCAN) 100 MG TABLET    Take 1 tablet (100 mg total) by mouth once daily.    FLUOCINOLONE ACETONIDE OIL 0.01 % DROP    Place 5 drops in ear(s) 2 (two) times a day.    HYDROCORTISONE 2.5 % CREAM    Apply topically 2 (two) times daily. for 5 days    TOPIRAMATE (TOPAMAX) 25 MG TABLET    Take 1 tablet (25 mg total) by mouth once daily.        Objective:     Vitals:    07/30/25 1458   BP: 121/60   Pulse: 97   Resp: 17   Temp: 98.1 °F (36.7 °C)       Physical Exam  Constitutional:       Appearance: Normal appearance. She is obese.   HENT:      Head: Normocephalic.      Right Ear: External ear normal.      Left Ear: External ear normal.      Nose: Nose normal.   Cardiovascular:      Rate and Rhythm: Normal rate and regular rhythm.      Heart sounds: Normal heart sounds.   Pulmonary:      Effort: Pulmonary effort is normal.      Breath sounds: Normal breath sounds.   Abdominal:      General: There is no distension.   Musculoskeletal:         General: No swelling. Normal range of motion.      Cervical back: Normal range of motion.   Lymphadenopathy:      Cervical: No cervical adenopathy.   Skin:     General: Skin is warm and dry.      Findings: No bruising.   Neurological:      Mental Status: She is alert and oriented to person, place, and time.   Psychiatric:         Mood and Affect: Mood normal.         Behavior: Behavior normal.         Thought Content: Thought content normal.         Judgment: Judgment normal.       Assessment:     Problem List Items Addressed This Visit          Oncology    Iron deficiency anemia - Primary    Relevant Orders    CBC w/ DIFF    FERRITIN    IRON AND TIBC       Lab Results   Component Value Date    WBC 9.56 07/29/2025    RBC 4.15 07/29/2025    HGB 7.9 (L) 07/29/2025    HCT 28.2 (L) 07/29/2025    MCV 68 (L) 07/29/2025    MCH 19.0 (L) 07/29/2025    MCHC  28.0 (L) 07/29/2025    RDW 22.5 (H) 07/29/2025     07/29/2025    MPV 10.1 07/29/2025    GRAN 4.2 11/16/2024    GRAN 57.5 11/16/2024    LYMPH 24.8 07/29/2025    LYMPH 2.37 07/29/2025    MONO 7.2 07/29/2025    MONO 0.69 07/29/2025    EOS 4.7 07/29/2025    EOS 0.45 07/29/2025    BASO 0.05 11/16/2024    EOSINOPHIL 3.9 11/16/2024    BASOPHIL 0.6 07/29/2025    BASOPHIL 0.06 07/29/2025      Lab Results   Component Value Date     (L) 07/29/2025    K 4.2 07/29/2025     07/29/2025    CO2 21 (L) 07/29/2025    BUN 17 07/29/2025    CREATININE 1.0 07/29/2025    CALCIUM 8.7 07/29/2025    ANIONGAP 9 07/29/2025    ESTGFRAFRICA >60.0 12/30/2021    EGFRNONAA >60.0 12/30/2021     Lab Results   Component Value Date    ALT <8 07/29/2025    AST 21 07/29/2025    ALKPHOS 64 07/29/2025    BILITOT 0.1 07/29/2025     Lab Results   Component Value Date    IRON 35 07/29/2025    TRANSFERRIN 351 07/29/2025    TIBC 519 (H) 07/29/2025    LABIRON 7 (L) 07/29/2025    FESATURATED 3 (L) 07/23/2024    FERRITIN 13.0 (L) 07/29/2025        Plan:   Iron deficiency anemia, unspecified iron deficiency anemia type  Severe ROSENDO as evidenced by Hgb 7.9, TSAT 3 and ferritin 13  Likely in setting of chronic blood loss related to menstrual cycles  Has received Injectafer and Infed in the past, however she prefers Injectafer formulation  Will schedule Injectafer once authorized  Follow-up 2 months after second infusion with lab work and BRITANY visit    BMT Chart Routing      Follow up with physician    Follow up with BRITANY 2 months. 2 months after Injectafer   Provider visit type Benign hem   Infusion scheduling note New or changed treatment   Injectafer   Injection scheduling note NA   Labs CBC, ferritin and iron and TIBC   Scheduling:  Preferred lab:  Lab interval:  Labs 1-2 days prior to visit   Imaging   NA   Pharmacy appointment No pharmacy appointment needed      Other referrals No nutrition appointment needed -  No referral to Oncology Primary  Care needed -  No massage appointment needed             Collaborating Provider:  Dr. 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    Tobacco Use    Smoking status: Never    Smokeless tobacco: Never   Substance and Sexual Activity    Alcohol use: Not Currently     Alcohol/week: 1.0 standard drink of alcohol     Types: 1 Glasses of wine per week     Comment: socially    Drug use: Never    Sexual activity: Not Currently     Partners: Male     Birth control/protection: Abstinence     Social Drivers of Health     Financial Resource Strain: Low Risk  (12/19/2024)    Overall Financial Resource Strain (CARDIA)     Difficulty of Paying Living Expenses: Not very hard   Food Insecurity: No Food Insecurity (12/19/2024)    Hunger Vital Sign     Worried About Running Out of Food in the Last Year: Never true     Ran Out of Food in the Last Year: Never true   Transportation Needs: No Transportation Needs (11/12/2023)    PRAPARE - Transportation     Lack of Transportation (Medical): No     Lack of Transportation (Non-Medical): No   Physical Activity: Insufficiently Active (12/19/2024)    Exercise Vital Sign     Days of Exercise per Week: 2 days     Minutes of Exercise per Session: 20 min   Stress: Stress Concern Present (12/19/2024)    [employer] [place] [employer] - Occupational Stress Questionnaire     Feeling of Stress : Rather much   Housing Stability: Low Risk  (11/12/2023)    Housing Stability Vital Sign     Unable to Pay for Housing in the Last Year: No     Number of Places Lived in the Last Year: 2     Unstable Housing in the Last Year: No

## 2025-08-01 ENCOUNTER — PATIENT MESSAGE (OUTPATIENT)
Dept: HEMATOLOGY/ONCOLOGY | Facility: CLINIC | Age: 45
End: 2025-08-01
Payer: COMMERCIAL

## 2025-08-20 ENCOUNTER — PATIENT MESSAGE (OUTPATIENT)
Dept: HEMATOLOGY/ONCOLOGY | Facility: CLINIC | Age: 45
End: 2025-08-20
Payer: COMMERCIAL

## 2025-09-03 ENCOUNTER — OFFICE VISIT (OUTPATIENT)
Dept: OTOLARYNGOLOGY | Facility: CLINIC | Age: 45
End: 2025-09-03
Payer: COMMERCIAL

## 2025-09-03 VITALS
SYSTOLIC BLOOD PRESSURE: 105 MMHG | DIASTOLIC BLOOD PRESSURE: 66 MMHG | WEIGHT: 183.44 LBS | BODY MASS INDEX: 31.32 KG/M2 | HEIGHT: 64 IN

## 2025-09-03 DIAGNOSIS — H60.8X3 CHRONIC ECZEMATOUS OTITIS EXTERNA OF BOTH EARS: Primary | ICD-10-CM

## 2025-09-03 PROCEDURE — 3008F BODY MASS INDEX DOCD: CPT | Mod: CPTII,S$GLB,, | Performed by: NURSE PRACTITIONER

## 2025-09-03 PROCEDURE — 3074F SYST BP LT 130 MM HG: CPT | Mod: CPTII,S$GLB,, | Performed by: NURSE PRACTITIONER

## 2025-09-03 PROCEDURE — 3078F DIAST BP <80 MM HG: CPT | Mod: CPTII,S$GLB,, | Performed by: NURSE PRACTITIONER

## 2025-09-03 PROCEDURE — 99212 OFFICE O/P EST SF 10 MIN: CPT | Mod: S$GLB,,, | Performed by: NURSE PRACTITIONER

## 2025-09-03 PROCEDURE — 1159F MED LIST DOCD IN RCRD: CPT | Mod: CPTII,S$GLB,, | Performed by: NURSE PRACTITIONER
